# Patient Record
Sex: MALE | Race: WHITE | Employment: OTHER | URBAN - METROPOLITAN AREA
[De-identification: names, ages, dates, MRNs, and addresses within clinical notes are randomized per-mention and may not be internally consistent; named-entity substitution may affect disease eponyms.]

---

## 2017-05-12 ENCOUNTER — EMPLOYEE WELLNESS (OUTPATIENT)
Dept: OTHER | Age: 72
End: 2017-05-12

## 2017-05-12 LAB
CHOLESTEROL, TOTAL: 202 MG/DL (ref 0–199)
GLUCOSE BLD-MCNC: 111 MG/DL (ref 74–107)
HDLC SERPL-MCNC: 41 MG/DL
LDL CHOLESTEROL CALCULATED: 133 MG/DL (ref 0–99)
TRIGL SERPL-MCNC: 142 MG/DL (ref 0–149)

## 2018-03-20 VITALS — BODY MASS INDEX: 30.41 KG/M2 | WEIGHT: 200 LBS

## 2018-04-05 ENCOUNTER — HOSPITAL ENCOUNTER (OUTPATIENT)
Dept: AUDIOLOGY | Age: 73
Discharge: HOME | End: 2018-04-05
Payer: COMMERCIAL

## 2018-04-05 PROCEDURE — 92567 TYMPANOMETRY: CPT | Performed by: AUDIOLOGIST

## 2018-04-05 PROCEDURE — 92557 COMPREHENSIVE HEARING TEST: CPT | Performed by: AUDIOLOGIST

## 2018-04-28 ENCOUNTER — EMPLOYEE WELLNESS (OUTPATIENT)
Dept: OTHER | Age: 73
End: 2018-04-28

## 2018-04-29 LAB
CHOLESTEROL, TOTAL: 164 MG/DL (ref 0–199)
GLUCOSE BLD-MCNC: 132 MG/DL (ref 74–107)
HBA1C MFR BLD: 6.4 % (ref 4.8–5.9)
HDLC SERPL-MCNC: 35 MG/DL
LDL CHOLESTEROL CALCULATED: 98 MG/DL (ref 0–99)
TRIGL SERPL-MCNC: 156 MG/DL (ref 0–149)

## 2018-05-07 VITALS — WEIGHT: 217 LBS | BODY MASS INDEX: 32.99 KG/M2

## 2018-10-12 ENCOUNTER — HOSPITAL ENCOUNTER (OUTPATIENT)
Dept: ULTRASOUND IMAGING | Age: 73
Discharge: HOME | End: 2018-10-12
Payer: COMMERCIAL

## 2018-10-12 DIAGNOSIS — R10.13 EPIGASTRIC PAIN: ICD-10-CM

## 2018-10-12 DIAGNOSIS — R10.13 EPIGASTRIC ABDOMINAL PAIN: ICD-10-CM

## 2018-10-12 PROCEDURE — 76775 US EXAM ABDO BACK WALL LIM: CPT

## 2018-10-12 PROCEDURE — 76705 ECHO EXAM OF ABDOMEN: CPT

## 2019-03-01 ENCOUNTER — HOSPITAL ENCOUNTER (OUTPATIENT)
Dept: AUDIOLOGY | Age: 74
Discharge: HOME | End: 2019-03-01

## 2019-03-01 PROCEDURE — 9990000010 HC NO CHARGE VISIT: Performed by: AUDIOLOGIST

## 2019-03-01 PROCEDURE — 92592 HC HEARING AID CHECK, ONE EAR: CPT | Performed by: AUDIOLOGIST

## 2019-05-11 ENCOUNTER — HOSPITAL ENCOUNTER (OUTPATIENT)
Dept: GENERAL RADIOLOGY | Age: 74
Discharge: HOME | End: 2019-05-13
Payer: COMMERCIAL

## 2019-05-11 ENCOUNTER — HOSPITAL ENCOUNTER (OUTPATIENT)
Age: 74
Discharge: HOME | End: 2019-05-13
Payer: COMMERCIAL

## 2019-05-11 DIAGNOSIS — M25.512 LEFT SHOULDER PAIN, UNSPECIFIED CHRONICITY: ICD-10-CM

## 2019-05-11 DIAGNOSIS — R52 PAIN: ICD-10-CM

## 2019-05-11 DIAGNOSIS — M89.8X2 PAIN OF LEFT HUMERUS: ICD-10-CM

## 2019-05-11 PROCEDURE — 71046 X-RAY EXAM CHEST 2 VIEWS: CPT

## 2019-05-11 PROCEDURE — 73060 X-RAY EXAM OF HUMERUS: CPT

## 2019-05-11 PROCEDURE — 73030 X-RAY EXAM OF SHOULDER: CPT

## 2019-05-31 ENCOUNTER — EMPLOYEE WELLNESS (OUTPATIENT)
Dept: OTHER | Age: 74
End: 2019-05-31

## 2019-05-31 LAB
CHOLESTEROL, TOTAL: 178 MG/DL (ref 0–199)
GLUCOSE BLD-MCNC: 127 MG/DL (ref 74–107)
HDLC SERPL-MCNC: 39 MG/DL
LDL CHOLESTEROL CALCULATED: 108 MG/DL (ref 0–99)
TRIGL SERPL-MCNC: 155 MG/DL (ref 0–149)

## 2019-06-01 LAB — HBA1C MFR BLD: 6.3 % (ref 4–5.6)

## 2019-06-10 VITALS — BODY MASS INDEX: 32.54 KG/M2 | WEIGHT: 214 LBS

## 2019-09-23 DIAGNOSIS — M25.562 ACUTE PAIN OF LEFT KNEE: Primary | ICD-10-CM

## 2019-09-24 ENCOUNTER — OFFICE VISIT (OUTPATIENT)
Dept: ORTHOPEDIC SURGERY | Age: 74
End: 2019-09-24
Payer: COMMERCIAL

## 2019-09-24 VITALS — WEIGHT: 220 LBS | TEMPERATURE: 98 F | HEIGHT: 67 IN | BODY MASS INDEX: 34.53 KG/M2

## 2019-09-24 DIAGNOSIS — M17.12 PRIMARY OSTEOARTHRITIS OF LEFT KNEE: Primary | ICD-10-CM

## 2019-09-24 DIAGNOSIS — M17.11 PRIMARY OSTEOARTHRITIS OF RIGHT KNEE: ICD-10-CM

## 2019-09-24 PROCEDURE — 99203 OFFICE O/P NEW LOW 30 MIN: CPT | Performed by: ORTHOPAEDIC SURGERY

## 2019-09-24 RX ORDER — LOSARTAN POTASSIUM 100 MG/1
100 TABLET ORAL DAILY
COMMUNITY
End: 2021-02-08

## 2019-09-24 NOTE — PROGRESS NOTES
Chief Complaint   Patient presents with    Knee Pain     left knee pain has been present for 7 to 8 weeks. no hx of injury. Subjective:     Patient ID: Sally Calvillo is a 75 y.o..  male    Knee Pain  Patient complains of left knee pain. This is evaluated as a personal injury. There was not a history of injury. The pain began 2 months ago. The pain is located medial. He describes  His symptoms as aching. He has experienced popping, clicking, locking, and giving way in the affected knee. The patient has had pain with kneeling, squating, and climbing stairs. Symptoms improve with rest. The symptoms are worse with activity. The knee has not given out or felt unstable. The patient can not bend and straighten the knee fully. The patient is active in none. Treatment to date has been nothing specific, without significant relief. The patient is not working. The patients occupation is retired3.      Past Medical History:   Diagnosis Date    Diabetes mellitus ([facility] [address]     Hyperlipidemia     Hypertension      Past Surgical History:   Procedure Laterality Date    CARPAL TUNNEL RELEASE  2004 2005    EYE SURGERY      bilat cataract extracftions       Current Outpatient Medications:     losartan (COZAAR) 100 MG tablet, Take 100 mg by mouth daily, Disp: , Rfl:     pioglitazone-metformin (ACTOPLUS MET)  MG per tablet, Take 1 tablet by mouth daily, Disp: , Rfl:     metFORMIN (GLUCOPHAGE) 500 MG tablet, Take 1,000 mg by mouth 2 times daily (with meals), Disp: , Rfl:     Omega-3 Fatty Acids (FISH OIL) 1000 MG CAPS, Take 3,000 mg by mouth daily, Disp: , Rfl:     vitamin B-1 (THIAMINE) 100 MG tablet, Take 100 mg by mouth daily, Disp: , Rfl:     ascorbic acid (VITAMIN C) 500 MG tablet, Take 1,000 mg by mouth daily, Disp: , Rfl:     Multiple Vitamins-Minerals (THERAPEUTIC MULTIVITAMIN-MINERALS) tablet, Take 1 tablet by mouth daily, Disp: , Rfl:     Misc Natural Products (GLUCOSAMINE CHOND COMPLEX/MSM PO), Take 2 tablets by mouth daily, Disp: , Rfl:     pravastatin (PRAVACHOL) 10 MG tablet, Take 10 mg by mouth daily, Disp: , Rfl:     Homeopathic Products (LEG CRAMP COMPLEX PO), Take by mouth, Disp: , Rfl:     diphenhydrAMINE (BENADRYL) 25 MG tablet, Take 50 mg by mouth nightly as needed for Itching or Sleep, Disp: , Rfl:     valsartan-hydrochlorothiazide (DIOVAN HCT) 160-12.5 MG per tablet, Take 1 tablet by mouth daily, Disp: , Rfl:     tadalafil (CIALIS) 20 MG tablet, Take 20 mg by mouth once a week, Disp: , Rfl:   Allergies   Allergen Reactions    Prednisone Other (See Comments)     \"symptoms of having a stroke\"     Social History     Socioeconomic History    Marital status:      Spouse name: Not on file    Number of children: Not on file    Years of education: Not on file    Highest education level: Not on file   Occupational History    Not on file   Social Needs    Financial resource strain: Not on file    Food insecurity:     Worry: Not on file     Inability: Not on file    Transportation needs:     Medical: Not on file     Non-medical: Not on file   Tobacco Use    Smoking status: Never Smoker    Smokeless tobacco: Never Used   Substance and Sexual Activity    Alcohol use: No    Drug use: No    Sexual activity: Not on file   Lifestyle    Physical activity:     Days per week: Not on file     Minutes per session: Not on file    Stress: Not on file   Relationships    Social connections:     Talks on phone: Not on file     Gets together: Not on file     Attends Jewish service: Not on file     Active member of club or organization: Not on file     Attends meetings of clubs or organizations: Not on file     Relationship status: Not on file    Intimate partner violence:     Fear of current or ex partner: Not on file     Emotionally abused: Not on file     Physically abused: Not on file     Forced sexual activity: Not on file   Other Topics Concern    Not on file   Social History greatest in the medial compartment with bone-on-bone contact. There is   no joint effusion. There is normal alignment. The patella is well   located.  No fractures are identified.           Radiographic findings reviewed with patient    Assessment:  Encounter Diagnoses   Name Primary?  Primary osteoarthritis of left knee Yes    Primary osteoarthritis of right knee        Plan:  Natural history and expected course discussed. Questions answered. Educational materials distributed. Rest, ice, compression, and elevation (RICE) therapy. Reduction in offending activity. I had a lengthy discussion with the patient regarding their diagnosis. I explained treatment options including surgical vs non surgical treatment. I reviewed in detail the risks and benefits and outlined the procedure in detail with expected outcomes and possible complications. I also discussed non surgical treatment such as injections (CSI and visco supplementation), physical therapy, topical creams and NSAID's. They have elected for conservative management at this time. The patient has failed conservative measures such as NSAIDS, HEP, and cortisone injections. He is an excellent candidate for viscous supplementation injections including synvisc one in the Left knee.    We will contact the patient's insurance company and see them back in the office once we have received approval.

## 2019-10-31 ENCOUNTER — OFFICE VISIT (OUTPATIENT)
Dept: ORTHOPEDIC SURGERY | Age: 74
End: 2019-10-31
Payer: COMMERCIAL

## 2019-10-31 DIAGNOSIS — M17.12 PRIMARY OSTEOARTHRITIS OF LEFT KNEE: Primary | ICD-10-CM

## 2019-10-31 PROCEDURE — 20610 DRAIN/INJ JOINT/BURSA W/O US: CPT | Performed by: ORTHOPAEDIC SURGERY

## 2019-11-07 ENCOUNTER — OFFICE VISIT (OUTPATIENT)
Dept: ORTHOPEDIC SURGERY | Age: 74
End: 2019-11-07
Payer: COMMERCIAL

## 2019-11-07 DIAGNOSIS — M17.12 PRIMARY OSTEOARTHRITIS OF LEFT KNEE: Primary | ICD-10-CM

## 2019-11-07 PROCEDURE — 20610 DRAIN/INJ JOINT/BURSA W/O US: CPT | Performed by: ORTHOPAEDIC SURGERY

## 2019-11-14 ENCOUNTER — OFFICE VISIT (OUTPATIENT)
Dept: ORTHOPEDIC SURGERY | Age: 74
End: 2019-11-14
Payer: COMMERCIAL

## 2019-11-14 DIAGNOSIS — M17.12 PRIMARY OSTEOARTHRITIS OF LEFT KNEE: Primary | ICD-10-CM

## 2019-11-14 PROCEDURE — 20610 DRAIN/INJ JOINT/BURSA W/O US: CPT | Performed by: ORTHOPAEDIC SURGERY

## 2020-06-08 ENCOUNTER — HOSPITAL ENCOUNTER (OUTPATIENT)
Age: 75
Discharge: HOME | End: 2020-06-10
Payer: COMMERCIAL

## 2020-06-08 LAB
ALBUMIN SERPL-MCNC: 4.4 G/DL (ref 3.5–5.2)
ALP BLD-CCNC: 63 U/L (ref 40–129)
ALT SERPL-CCNC: 15 U/L (ref 0–40)
ANION GAP SERPL CALCULATED.3IONS-SCNC: 13 MMOL/L (ref 7–16)
AST SERPL-CCNC: 22 U/L (ref 0–39)
BASOPHILS ABSOLUTE: 0.04 E9/L (ref 0–0.2)
BASOPHILS RELATIVE PERCENT: 0.6 % (ref 0–2)
BILIRUB SERPL-MCNC: 0.7 MG/DL (ref 0–1.2)
BUN BLDV-MCNC: 23 MG/DL (ref 8–23)
CALCIUM SERPL-MCNC: 9.9 MG/DL (ref 8.6–10.2)
CHLORIDE BLD-SCNC: 103 MMOL/L (ref 98–107)
CHOLESTEROL, TOTAL: 172 MG/DL (ref 0–199)
CO2: 25 MMOL/L (ref 22–29)
CREAT SERPL-MCNC: 1.4 MG/DL (ref 0.7–1.2)
EOSINOPHILS ABSOLUTE: 0.16 E9/L (ref 0.05–0.5)
EOSINOPHILS RELATIVE PERCENT: 2.3 % (ref 0–6)
GFR AFRICAN AMERICAN: 60
GFR NON-AFRICAN AMERICAN: 49 ML/MIN/1.73
GLUCOSE BLD-MCNC: 126 MG/DL (ref 74–99)
HBA1C MFR BLD: 6.3 % (ref 4–5.6)
HCT VFR BLD CALC: 41.5 % (ref 37–54)
HDLC SERPL-MCNC: 41 MG/DL
HEMOGLOBIN: 13.2 G/DL (ref 12.5–16.5)
IMMATURE GRANULOCYTES #: 0.05 E9/L
IMMATURE GRANULOCYTES %: 0.7 % (ref 0–5)
LDL CHOLESTEROL CALCULATED: 105 MG/DL (ref 0–99)
LYMPHOCYTES ABSOLUTE: 1.72 E9/L (ref 1.5–4)
LYMPHOCYTES RELATIVE PERCENT: 24.7 % (ref 20–42)
MCH RBC QN AUTO: 31.1 PG (ref 26–35)
MCHC RBC AUTO-ENTMCNC: 31.8 % (ref 32–34.5)
MCV RBC AUTO: 97.6 FL (ref 80–99.9)
MICROALBUMIN UR-MCNC: 20.2 MG/L
MONOCYTES ABSOLUTE: 0.72 E9/L (ref 0.1–0.95)
MONOCYTES RELATIVE PERCENT: 10.3 % (ref 2–12)
NEUTROPHILS ABSOLUTE: 4.27 E9/L (ref 1.8–7.3)
NEUTROPHILS RELATIVE PERCENT: 61.4 % (ref 43–80)
PDW BLD-RTO: 13.4 FL (ref 11.5–15)
PLATELET # BLD: 175 E9/L (ref 130–450)
PMV BLD AUTO: 13.3 FL (ref 7–12)
POTASSIUM SERPL-SCNC: 4.7 MMOL/L (ref 3.5–5)
PROSTATE SPECIFIC ANTIGEN: 3.84 NG/ML (ref 0–4)
RBC # BLD: 4.25 E12/L (ref 3.8–5.8)
SODIUM BLD-SCNC: 141 MMOL/L (ref 132–146)
T4 FREE: 1.15 NG/DL (ref 0.93–1.7)
TOTAL PROTEIN: 6.9 G/DL (ref 6.4–8.3)
TRIGL SERPL-MCNC: 131 MG/DL (ref 0–149)
TSH SERPL DL<=0.05 MIU/L-ACNC: 3.75 UIU/ML (ref 0.27–4.2)
VLDLC SERPL CALC-MCNC: 26 MG/DL
WBC # BLD: 7 E9/L (ref 4.5–11.5)

## 2020-06-08 PROCEDURE — 82044 UR ALBUMIN SEMIQUANTITATIVE: CPT

## 2020-06-08 PROCEDURE — 83036 HEMOGLOBIN GLYCOSYLATED A1C: CPT

## 2020-06-08 PROCEDURE — 80053 COMPREHEN METABOLIC PANEL: CPT

## 2020-06-08 PROCEDURE — 36415 COLL VENOUS BLD VENIPUNCTURE: CPT

## 2020-06-08 PROCEDURE — G0103 PSA SCREENING: HCPCS

## 2020-06-08 PROCEDURE — 80061 LIPID PANEL: CPT

## 2020-06-08 PROCEDURE — 84439 ASSAY OF FREE THYROXINE: CPT

## 2020-06-08 PROCEDURE — 84443 ASSAY THYROID STIM HORMONE: CPT

## 2020-06-08 PROCEDURE — 85025 COMPLETE CBC W/AUTO DIFF WBC: CPT

## 2020-07-22 ENCOUNTER — HOSPITAL ENCOUNTER (OUTPATIENT)
Age: 75
Discharge: HOME | End: 2020-07-24
Payer: COMMERCIAL

## 2020-07-22 LAB
ANION GAP SERPL CALCULATED.3IONS-SCNC: 11 MMOL/L (ref 7–16)
BUN BLDV-MCNC: 20 MG/DL (ref 8–23)
CALCIUM SERPL-MCNC: 9.6 MG/DL (ref 8.6–10.2)
CHLORIDE BLD-SCNC: 100 MMOL/L (ref 98–107)
CO2: 29 MMOL/L (ref 22–29)
CREAT SERPL-MCNC: 1.3 MG/DL (ref 0.7–1.2)
GFR AFRICAN AMERICAN: >60
GFR NON-AFRICAN AMERICAN: 54 ML/MIN/1.73
GLUCOSE BLD-MCNC: 170 MG/DL (ref 74–99)
PARATHYROID HORMONE INTACT: 20 PG/ML (ref 15–65)
PHOSPHORUS: 2.9 MG/DL (ref 2.5–4.5)
POTASSIUM SERPL-SCNC: 4.4 MMOL/L (ref 3.5–5)
SODIUM BLD-SCNC: 140 MMOL/L (ref 132–146)

## 2020-07-22 PROCEDURE — 84100 ASSAY OF PHOSPHORUS: CPT

## 2020-07-22 PROCEDURE — 83970 ASSAY OF PARATHORMONE: CPT

## 2020-07-22 PROCEDURE — 36415 COLL VENOUS BLD VENIPUNCTURE: CPT

## 2020-07-22 PROCEDURE — 80048 BASIC METABOLIC PNL TOTAL CA: CPT

## 2020-09-24 VITALS
RESPIRATION RATE: 12 BRPM | BODY MASS INDEX: 34.14 KG/M2 | HEART RATE: 74 BPM | WEIGHT: 218 LBS | DIASTOLIC BLOOD PRESSURE: 60 MMHG | SYSTOLIC BLOOD PRESSURE: 132 MMHG

## 2020-12-29 NOTE — TELEPHONE ENCOUNTER
Patient called for refill.     Patient is requesting a refill of:  Medication: Metformin   Dose: 500 mg   Frequency: 2 times daily  Pharmacy: [facility]    Patient is requesting a refill of:  Medication: Actoplus Met  Dose:  mg  Frequency: 1 daily  Pharmacy: [address] [place] [address] seen Visit date not found  Next appt 2/8/2021

## 2020-12-30 RX ORDER — PIOGLITAZONE HCL AND METFORMIN HCL 850; 15 MG/1; MG/1
1 TABLET ORAL DAILY
Qty: 90 TABLET | Refills: 1 | Status: SHIPPED
Start: 2020-12-30 | End: 2021-02-08

## 2020-12-30 RX ORDER — METFORMIN HYDROCHLORIDE 500 MG/1
500 TABLET, EXTENDED RELEASE ORAL 2 TIMES DAILY
Qty: 180 TABLET | Refills: 1 | Status: SHIPPED
Start: 2020-12-30 | End: 2021-02-08 | Stop reason: SDUPTHER

## 2021-02-03 ENCOUNTER — IMMUNIZATION (OUTPATIENT)
Dept: PRIMARY CARE CLINIC | Age: 76
End: 2021-02-03
Payer: COMMERCIAL

## 2021-02-03 PROCEDURE — 0011A COVID-19, MODERNA VACCINE 100MCG/0.5ML DOSE: CPT | Performed by: NURSE PRACTITIONER

## 2021-02-03 PROCEDURE — 91301 COVID-19, MODERNA VACCINE 100MCG/0.5ML DOSE: CPT | Performed by: NURSE PRACTITIONER

## 2021-02-08 ENCOUNTER — OFFICE VISIT (OUTPATIENT)
Dept: FAMILY MEDICINE CLINIC | Age: 76
End: 2021-02-08
Payer: COMMERCIAL

## 2021-02-08 VITALS
WEIGHT: 218 LBS | TEMPERATURE: 97.5 F | SYSTOLIC BLOOD PRESSURE: 138 MMHG | BODY MASS INDEX: 34.21 KG/M2 | OXYGEN SATURATION: 96 % | DIASTOLIC BLOOD PRESSURE: 84 MMHG | HEIGHT: 67 IN | HEART RATE: 100 BPM

## 2021-02-08 DIAGNOSIS — E03.9 HYPOTHYROIDISM, UNSPECIFIED TYPE: ICD-10-CM

## 2021-02-08 DIAGNOSIS — I10 ESSENTIAL HYPERTENSION: Primary | ICD-10-CM

## 2021-02-08 DIAGNOSIS — E11.9 TYPE 2 DIABETES MELLITUS WITHOUT COMPLICATION, WITHOUT LONG-TERM CURRENT USE OF INSULIN (HCC): ICD-10-CM

## 2021-02-08 DIAGNOSIS — E78.5 HYPERLIPIDEMIA, UNSPECIFIED HYPERLIPIDEMIA TYPE: ICD-10-CM

## 2021-02-08 DIAGNOSIS — Z12.5 PROSTATE CANCER SCREENING: ICD-10-CM

## 2021-02-08 PROCEDURE — 99213 OFFICE O/P EST LOW 20 MIN: CPT | Performed by: INTERNAL MEDICINE

## 2021-02-08 RX ORDER — METFORMIN HYDROCHLORIDE 500 MG/1
500 TABLET, EXTENDED RELEASE ORAL
Qty: 270 TABLET | Refills: 1 | Status: SHIPPED
Start: 2021-02-08 | End: 2021-06-24 | Stop reason: SDUPTHER

## 2021-02-08 RX ORDER — TADALAFIL 20 MG/1
20 TABLET ORAL WEEKLY
Qty: 40 TABLET | Refills: 1 | Status: SHIPPED | OUTPATIENT
Start: 2021-02-08 | End: 2022-05-25 | Stop reason: SDUPTHER

## 2021-02-08 RX ORDER — ACETAMINOPHEN 160 MG
TABLET,DISINTEGRATING ORAL
COMMUNITY

## 2021-02-08 RX ORDER — PIOGLITAZONE HCL AND METFORMIN HCL 850; 15 MG/1; MG/1
1 TABLET ORAL DAILY
Qty: 90 TABLET | Refills: 1 | Status: CANCELLED | OUTPATIENT
Start: 2021-02-08

## 2021-02-08 RX ORDER — PRAVASTATIN SODIUM 10 MG
10 TABLET ORAL DAILY
Qty: 90 TABLET | Refills: 1 | Status: SHIPPED
Start: 2021-02-08 | End: 2021-06-24 | Stop reason: SDUPTHER

## 2021-02-08 RX ORDER — VALSARTAN AND HYDROCHLOROTHIAZIDE 160; 12.5 MG/1; MG/1
1 TABLET, FILM COATED ORAL DAILY
Qty: 90 TABLET | Refills: 1 | Status: SHIPPED
Start: 2021-02-08 | End: 2021-06-24 | Stop reason: SDUPTHER

## 2021-02-08 SDOH — ECONOMIC STABILITY: TRANSPORTATION INSECURITY
IN THE PAST 12 MONTHS, HAS LACK OF TRANSPORTATION KEPT YOU FROM MEETINGS, WORK, OR FROM GETTING THINGS NEEDED FOR DAILY LIVING?: NO

## 2021-02-08 SDOH — ECONOMIC STABILITY: TRANSPORTATION INSECURITY
IN THE PAST 12 MONTHS, HAS THE LACK OF TRANSPORTATION KEPT YOU FROM MEDICAL APPOINTMENTS OR FROM GETTING MEDICATIONS?: NOT ASKED

## 2021-02-08 SDOH — ECONOMIC STABILITY: FOOD INSECURITY: WITHIN THE PAST 12 MONTHS, YOU WORRIED THAT YOUR FOOD WOULD RUN OUT BEFORE YOU GOT MONEY TO BUY MORE.: NEVER TRUE

## 2021-02-08 ASSESSMENT — PATIENT HEALTH QUESTIONNAIRE - PHQ9
SUM OF ALL RESPONSES TO PHQ QUESTIONS 1-9: 0
1. LITTLE INTEREST OR PLEASURE IN DOING THINGS: 0
SUM OF ALL RESPONSES TO PHQ QUESTIONS 1-9: 0
SUM OF ALL RESPONSES TO PHQ QUESTIONS 1-9: 0
1. LITTLE INTEREST OR PLEASURE IN DOING THINGS: 0
SUM OF ALL RESPONSES TO PHQ QUESTIONS 1-9: 0
1. LITTLE INTEREST OR PLEASURE IN DOING THINGS: 0

## 2021-02-08 NOTE — PROGRESS NOTES
Subjective:     Chief Complaint   Patient presents with    6 Month Follow-Up   Follow-up on diabetes hypertension  Denies any chest pain palpitation  No blood in urine  No shortness of breath  Physically active  He had Zostavax, had pneumonia vaccine,      Past Medical History:   Diagnosis Date    Diabetes mellitus ([facility]     Erectile dysfunction     GERD (gastroesophageal reflux disease)     Hyperlipidemia     Hypertension     Type 2 diabetes mellitus without complication ([facility]         Social History     Socioeconomic History    Marital status:      Spouse name: Not on file    Number of children: Not on file    Years of education: Not on file    Highest education level: Not on file   Occupational History    Not on file   Social Needs    Financial resource strain: Not on file    Food insecurity     Worry: Never true     Inability: Never true   "[employer] needs     Medical: Not on file     Non-medical: No   Tobacco Use    Smoking status: Never Smoker    Smokeless tobacco: Never Used   Substance and Sexual Activity    Alcohol use: No    Drug use: No    Sexual activity: Not on file   Lifestyle    Physical activity     Days per week: Not on file     Minutes per session: Not on file    Stress: Not on file   Relationships    Social connections     Talks on phone: Not on file     Gets together: Not on file     Attends Congregation service: Not on file     Active member of club or organization: Not on file     Attends meetings of clubs or organizations: Not on file     Relationship status: Not on file    Intimate partner violence     Fear of current or ex partner: Not on file     Emotionally abused: Not on file     Physically abused: Not on file     Forced sexual activity: Not on file   Other Topics Concern    Not on file   Social History Narrative    Not on file        Past Surgical History:   Procedure Laterality Date    CARPAL TUNNEL RELEASE  2004 2005    EYE SURGERY bilat cataract extracftions    HEMORRHOID SURGERY      TOENAIL EXCISION          Family History   Problem Relation Age of Onset    Coronary Art Dis Mother     Cancer Mother         UTERINE    Diabetes Mother     Other Father 72        AAA        Allergies   Allergen Reactions    Prednisone Other (See Comments)     \"symptoms of having a stroke\"        ROS  No acute distress  Cardiac: Denies any chest pain or palpitation  Respiratory: Denies any cough or shortness of breath  GI: No abdominal pain. Denies any nausea vomiting or diarrhea  Had colonoscopy 2018 by Dr. Kady Jordan repeat in 23   denies any dysuria frequency no hematuria  Neuro: No headache or dizziness  Endocrine: No diabetes  Skin: normal  No recent weight gain or weight loss  Denies any change in vision    Objective:    /84   Pulse 100   Temp 97.5 °F (36.4 °C)   Ht 5' 7\" (1.702 m)   Wt 218 lb (98.9 kg)   SpO2 96%   BMI 34.14 kg/m²     Constitutional: Alert awake and oriented  Eyes: Pupils equal bilaterally. Extraocular muscles intact  Neck: no JVD adenopathy no bruit  Heart:  RRR, no murmurs, gallops, or rubs.   Lungs:    no wheeze, rales or rhonchi  Abd: bowel sounds present, nontender, nondistended, no masses  Extrem:  No clubbing, cyanosis, or edema  Neuro: AAOx3,No Focal deficit  Psychological: no depression or anxiety       Current Outpatient Medications   Medication Sig Dispense Refill    Cholecalciferol (VITAMIN D3) 50 MCG (2000 UT) CAPS Take by mouth      metFORMIN (GLUCOPHAGE-XR) 500 MG extended release tablet Take 1 tablet by mouth 3 times daily (with meals) 270 tablet 1    pravastatin (PRAVACHOL) 10 MG tablet Take 1 tablet by mouth daily 90 tablet 1    tadalafil (CIALIS) 20 MG tablet Take 1 tablet by mouth once a week 40 tablet 1    valsartan-hydrochlorothiazide (DIOVAN HCT) 160-12.5 MG per tablet Take 1 tablet by mouth daily 90 tablet 1  pioglitazone-metformin (ACTOPLUS MET)  MG per tablet Take 1 tablet by mouth daily 90 tablet 1    Omega-3 Fatty Acids (FISH OIL) 1000 MG CAPS Take 3,000 mg by mouth daily      vitamin B-1 (THIAMINE) 100 MG tablet Take 100 mg by mouth daily      ascorbic acid (VITAMIN C) 500 MG tablet Take 1,000 mg by mouth daily      Multiple Vitamins-Minerals (THERAPEUTIC MULTIVITAMIN-MINERALS) tablet Take 1 tablet by mouth daily      Misc Natural Products (GLUCOSAMINE CHOND COMPLEX/MSM PO) Take 2 tablets by mouth daily       No current facility-administered medications for this visit.          Last 3 BMP  Lab Results   Component Value Date/Time     07/22/2020 01:38 PM     06/08/2020 09:35 AM     02/17/2017 09:52 AM    K 4.4 07/22/2020 01:38 PM    K 4.7 06/08/2020 09:35 AM    K 4.3 02/17/2017 09:52 AM     07/22/2020 01:38 PM     06/08/2020 09:35 AM     02/17/2017 09:52 AM    CO2 29 07/22/2020 01:38 PM    CO2 25 06/08/2020 09:35 AM    CO2 27 02/17/2017 09:52 AM    BUN 20 07/22/2020 01:38 PM    BUN 23 06/08/2020 09:35 AM    BUN 17 02/17/2017 09:52 AM    CREATININE 1.3 (H) 07/22/2020 01:38 PM    CREATININE 1.4 (H) 06/08/2020 09:35 AM    CREATININE 1.2 02/17/2017 09:52 AM    GLUCOSE 170 (H) 07/22/2020 01:38 PM    GLUCOSE 126 (H) 06/08/2020 09:35 AM    GLUCOSE 127 (H) 05/31/2019 06:30 AM    GLUCOSE 144 (H) 03/28/2012 09:41 AM    GLUCOSE 157 (H) 11/10/2010 09:18 AM    CALCIUM 9.6 07/22/2020 01:38 PM    CALCIUM 9.9 06/08/2020 09:35 AM    CALCIUM 9.3 02/17/2017 09:52 AM       Last 3 CMP:    Lab Results   Component Value Date/Time     07/22/2020 01:38 PM     06/08/2020 09:35 AM     02/17/2017 09:52 AM    K 4.4 07/22/2020 01:38 PM    K 4.7 06/08/2020 09:35 AM    K 4.3 02/17/2017 09:52 AM     07/22/2020 01:38 PM     06/08/2020 09:35 AM     02/17/2017 09:52 AM    CO2 29 07/22/2020 01:38 PM    CO2 25 06/08/2020 09:35 AM    CO2 27 02/17/2017 09:52 AM BUN 20 07/22/2020 01:38 PM    BUN 23 06/08/2020 09:35 AM    BUN 17 02/17/2017 09:52 AM    CREATININE 1.3 (H) 07/22/2020 01:38 PM    CREATININE 1.4 (H) 06/08/2020 09:35 AM    CREATININE 1.2 02/17/2017 09:52 AM    GLUCOSE 170 (H) 07/22/2020 01:38 PM    GLUCOSE 126 (H) 06/08/2020 09:35 AM    GLUCOSE 127 (H) 05/31/2019 06:30 AM    GLUCOSE 144 (H) 03/28/2012 09:41 AM    GLUCOSE 157 (H) 11/10/2010 09:18 AM    CALCIUM 9.6 07/22/2020 01:38 PM    CALCIUM 9.9 06/08/2020 09:35 AM    CALCIUM 9.3 02/17/2017 09:52 AM    PROT 6.9 06/08/2020 09:35 AM    PROT 6.8 02/17/2017 09:52 AM    PROT 6.7 02/01/2016 10:13 AM    LABALBU 4.4 06/08/2020 09:35 AM    LABALBU 4.1 02/17/2017 09:52 AM    LABALBU 4.1 02/01/2016 10:13 AM    LABALBU 4.3 03/28/2012 09:41 AM    LABALBU 4.2 11/10/2010 09:18 AM    BILITOT 0.7 06/08/2020 09:35 AM    BILITOT 0.8 02/17/2017 09:52 AM    BILITOT 0.7 02/01/2016 10:13 AM    ALKPHOS 63 06/08/2020 09:35 AM    ALKPHOS 65 02/17/2017 09:52 AM    ALKPHOS 59 02/01/2016 10:13 AM    AST 22 06/08/2020 09:35 AM    AST 23 02/17/2017 09:52 AM    AST 21 02/01/2016 10:13 AM    ALT 15 06/08/2020 09:35 AM    ALT 15 02/17/2017 09:52 AM    ALT 15 02/01/2016 10:13 AM        CBC:   Lab Results   Component Value Date/Time    WBC 7.0 06/08/2020 09:35 AM    RBC 4.25 06/08/2020 09:35 AM    HGB 13.2 06/08/2020 09:35 AM    HCT 41.5 06/08/2020 09:35 AM    MCV 97.6 06/08/2020 09:35 AM    MCH 31.1 06/08/2020 09:35 AM    MCHC 31.8 (L) 06/08/2020 09:35 AM    RDW 13.4 06/08/2020 09:35 AM     06/08/2020 09:35 AM    MPV 13.3 (H) 06/08/2020 09:35 AM       A1C:  Lab Results   Component Value Date/Time    LABA1C 6.3 (H) 06/08/2020 09:35 AM       Lipid panel:  Lab Results   Component Value Date    CHOL 172 06/08/2020    CHOL 178 05/31/2019    CHOL 164 04/28/2018    TRIG 131 06/08/2020    TRIG 155 05/31/2019    TRIG 156 04/28/2018    HDL 41 06/08/2020    HDL 39 05/31/2019    HDL 35 04/28/2018        Lab Results   Component Value Date/Time PROT 6.9 06/08/2020 09:35 AM    PROT 6.8 02/17/2017 09:52 AM    PROT 6.7 02/01/2016 10:13 AM       No results found for: MG      Assessment. Srini Berman was seen today for 6 month follow-up. Diagnoses and all orders for this visit:    Essential hypertension  -     CBC Auto Differential; Future    Hyperlipidemia, unspecified hyperlipidemia type  -     CBC Auto Differential; Future  -     Comprehensive Metabolic Panel; Future  -     Lipid Panel; Future    Type 2 diabetes mellitus without complication, without long-term current use of insulin (HCC)  -     Hemoglobin A1C; Future  -     Urinalysis; Future  -     MICROALBUMIN / CREATININE URINE RATIO; Future    Prostate cancer screening  -     PSA screening; Future    Hypothyroidism, unspecified type  -     CBC Auto Differential; Future  -     T4; Future  -     TSH without Reflex; Future    Other orders  -     metFORMIN (GLUCOPHAGE-XR) 500 MG extended release tablet; Take 1 tablet by mouth 3 times daily (with meals)  -     pravastatin (PRAVACHOL) 10 MG tablet; Take 1 tablet by mouth daily  -     tadalafil (CIALIS) 20 MG tablet; Take 1 tablet by mouth once a week  -     valsartan-hydrochlorothiazide (DIOVAN HCT) 160-12.5 MG per tablet; Take 1 tablet by mouth daily       Patient Active Problem List   Diagnosis    Primary osteoarthritis of left knee    Primary osteoarthritis of right knee       Plan: Risk and benefit of Actos Metformin discussed again it has been discussed in the past  Today he decided to come off but and increase the Metformin to 3 tablets check A1c in the next visit in 3 months    Patient has erectile dysfunction Cialis does help prescription was given  Check a PSA    Continue current pravastatin check lipid profile      He is drinking more fluids now check kidney function      Return in about 3 months (around 5/8/2021).        Deon Chance MD  12:09 PM  2/8/2021     [place]

## 2021-03-03 ENCOUNTER — IMMUNIZATION (OUTPATIENT)
Dept: PRIMARY CARE CLINIC | Age: 76
End: 2021-03-03
Payer: COMMERCIAL

## 2021-03-03 PROCEDURE — 0012A COVID-19, MODERNA VACCINE 100MCG/0.5ML DOSE: CPT | Performed by: NURSE PRACTITIONER

## 2021-03-03 PROCEDURE — 91301 COVID-19, MODERNA VACCINE 100MCG/0.5ML DOSE: CPT | Performed by: NURSE PRACTITIONER

## 2021-04-24 ENCOUNTER — NURSE TRIAGE (OUTPATIENT)
Dept: OTHER | Facility: CLINIC | Age: 76
End: 2021-04-24

## 2021-04-24 NOTE — TELEPHONE ENCOUNTER
Reason for Disposition   [1] High-risk adult (e.g., age > 61, osteoporosis, chronic steroid use) AND [2] still hurts    Answer Assessment - Initial Assessment Questions  1. MECHANISM: \"How did the injury happen? \"      3 weeks ago cut his arm on piece of metal    2. ONSET: \"When did the injury happen? \" (Minutes or hours ago)   3 weeks ago    3. LOCATION: \"Where is the injury located? \"   Left arm    4. APPEARANCE of INJURY: \"What does the injury look like? \"   red , puffy    5. SEVERITY: \"Can you use the arm normally? \"      No    6. SWELLING or BRUISING: \"is there any swelling or bruising? \" If so, ask: \"How large is it? (e.g., inches, centimeters)    swelling, top of [place] soda can    7. PAIN: \"Is there pain? \" If so, ask: \"How bad is the pain? \"    (Scale 1-10; or mild, moderate, severe)    Mild    8. TETANUS: For any breaks in the skin, ask: \"When was the last tetanus booster? \"    No    9. OTHER SYMPTOMS: \"Do you have any other symptoms? \"  (e.g., numbness in hand)  No    Protocols used: ARM INJURY-ADULT-  Caller has health insurance questions. Number given, then call transferred to [facility] customer service, [phone]. Brief description of triage: arm injury    Triage indicates for patient to be seen at urgent care ore er in next 24 hours    Care advice provided, patient verbalizes understanding; denies any other questions or concerns; instructed to call back for any new or worsening symptoms. Attention Provider: Thank you for allowing me to participate in the care of your patient. The patient was connected to triage in response to symptoms provided. Please do not respond through this encounter as the response is not directed to a shared pool. Konstantin Heredia

## 2021-06-16 DIAGNOSIS — Z12.5 PROSTATE CANCER SCREENING: ICD-10-CM

## 2021-06-16 DIAGNOSIS — E03.9 HYPOTHYROIDISM, UNSPECIFIED TYPE: ICD-10-CM

## 2021-06-16 DIAGNOSIS — I10 ESSENTIAL HYPERTENSION: ICD-10-CM

## 2021-06-16 DIAGNOSIS — E78.5 HYPERLIPIDEMIA, UNSPECIFIED HYPERLIPIDEMIA TYPE: ICD-10-CM

## 2021-06-16 DIAGNOSIS — E11.9 TYPE 2 DIABETES MELLITUS WITHOUT COMPLICATION, WITHOUT LONG-TERM CURRENT USE OF INSULIN (HCC): ICD-10-CM

## 2021-06-16 LAB
ALBUMIN SERPL-MCNC: 4.6 G/DL (ref 3.5–5.2)
ALP BLD-CCNC: 82 U/L (ref 40–129)
ALT SERPL-CCNC: 20 U/L (ref 0–40)
ANION GAP SERPL CALCULATED.3IONS-SCNC: 12 MMOL/L (ref 7–16)
AST SERPL-CCNC: 25 U/L (ref 0–39)
BASOPHILS ABSOLUTE: 0.04 E9/L (ref 0–0.2)
BASOPHILS RELATIVE PERCENT: 0.6 % (ref 0–2)
BILIRUB SERPL-MCNC: 0.6 MG/DL (ref 0–1.2)
BUN BLDV-MCNC: 20 MG/DL (ref 6–23)
CALCIUM SERPL-MCNC: 9.8 MG/DL (ref 8.6–10.2)
CHLORIDE BLD-SCNC: 101 MMOL/L (ref 98–107)
CHOLESTEROL, TOTAL: 172 MG/DL (ref 0–199)
CO2: 24 MMOL/L (ref 22–29)
CREAT SERPL-MCNC: 1.2 MG/DL (ref 0.7–1.2)
CREATININE URINE: 173 MG/DL (ref 40–278)
EOSINOPHILS ABSOLUTE: 0.15 E9/L (ref 0.05–0.5)
EOSINOPHILS RELATIVE PERCENT: 2.4 % (ref 0–6)
GFR AFRICAN AMERICAN: >60
GFR NON-AFRICAN AMERICAN: 59 ML/MIN/1.73
GLUCOSE BLD-MCNC: 140 MG/DL (ref 74–99)
HBA1C MFR BLD: 6.6 % (ref 4–5.6)
HCT VFR BLD CALC: 44.1 % (ref 37–54)
HDLC SERPL-MCNC: 43 MG/DL
HEMOGLOBIN: 14.5 G/DL (ref 12.5–16.5)
IMMATURE GRANULOCYTES #: 0.04 E9/L
IMMATURE GRANULOCYTES %: 0.6 % (ref 0–5)
LDL CHOLESTEROL CALCULATED: 108 MG/DL (ref 0–99)
LYMPHOCYTES ABSOLUTE: 1.45 E9/L (ref 1.5–4)
LYMPHOCYTES RELATIVE PERCENT: 23 % (ref 20–42)
MCH RBC QN AUTO: 31 PG (ref 26–35)
MCHC RBC AUTO-ENTMCNC: 32.9 % (ref 32–34.5)
MCV RBC AUTO: 94.2 FL (ref 80–99.9)
MICROALBUMIN UR-MCNC: 22 MG/L
MICROALBUMIN/CREAT UR-RTO: 12.7 (ref 0–30)
MONOCYTES ABSOLUTE: 0.64 E9/L (ref 0.1–0.95)
MONOCYTES RELATIVE PERCENT: 10.1 % (ref 2–12)
NEUTROPHILS ABSOLUTE: 3.99 E9/L (ref 1.8–7.3)
NEUTROPHILS RELATIVE PERCENT: 63.3 % (ref 43–80)
PDW BLD-RTO: 12.7 FL (ref 11.5–15)
PLATELET # BLD: 175 E9/L (ref 130–450)
PMV BLD AUTO: 13.5 FL (ref 7–12)
POTASSIUM SERPL-SCNC: 4.7 MMOL/L (ref 3.5–5)
PROSTATE SPECIFIC ANTIGEN: 2.79 NG/ML (ref 0–4)
RBC # BLD: 4.68 E12/L (ref 3.8–5.8)
SODIUM BLD-SCNC: 137 MMOL/L (ref 132–146)
T4 TOTAL: 6.9 MCG/DL (ref 4.5–11.7)
TOTAL PROTEIN: 7.2 G/DL (ref 6.4–8.3)
TRIGL SERPL-MCNC: 103 MG/DL (ref 0–149)
TSH SERPL DL<=0.05 MIU/L-ACNC: 2.63 UIU/ML (ref 0.27–4.2)
VLDLC SERPL CALC-MCNC: 21 MG/DL
WBC # BLD: 6.3 E9/L (ref 4.5–11.5)

## 2021-06-24 ENCOUNTER — OFFICE VISIT (OUTPATIENT)
Dept: FAMILY MEDICINE CLINIC | Age: 76
End: 2021-06-24
Payer: COMMERCIAL

## 2021-06-24 VITALS
TEMPERATURE: 95.7 F | WEIGHT: 211 LBS | HEART RATE: 92 BPM | BODY MASS INDEX: 33.05 KG/M2 | DIASTOLIC BLOOD PRESSURE: 74 MMHG | SYSTOLIC BLOOD PRESSURE: 130 MMHG | OXYGEN SATURATION: 98 %

## 2021-06-24 DIAGNOSIS — M17.0 PRIMARY OSTEOARTHRITIS OF BOTH KNEES: ICD-10-CM

## 2021-06-24 DIAGNOSIS — R80.9 PROTEINURIA, UNSPECIFIED TYPE: ICD-10-CM

## 2021-06-24 DIAGNOSIS — I10 ESSENTIAL HYPERTENSION: ICD-10-CM

## 2021-06-24 DIAGNOSIS — E11.22 CKD STAGE 3 SECONDARY TO DIABETES (HCC): ICD-10-CM

## 2021-06-24 DIAGNOSIS — E11.9 TYPE 2 DIABETES MELLITUS WITHOUT COMPLICATION, WITHOUT LONG-TERM CURRENT USE OF INSULIN (HCC): ICD-10-CM

## 2021-06-24 DIAGNOSIS — N52.9 ERECTILE DYSFUNCTION, UNSPECIFIED ERECTILE DYSFUNCTION TYPE: ICD-10-CM

## 2021-06-24 DIAGNOSIS — N18.30 CKD STAGE 3 SECONDARY TO DIABETES (HCC): ICD-10-CM

## 2021-06-24 DIAGNOSIS — E78.5 HYPERLIPIDEMIA, UNSPECIFIED HYPERLIPIDEMIA TYPE: Primary | ICD-10-CM

## 2021-06-24 PROCEDURE — 99213 OFFICE O/P EST LOW 20 MIN: CPT | Performed by: INTERNAL MEDICINE

## 2021-06-24 RX ORDER — METFORMIN HYDROCHLORIDE 500 MG/1
500 TABLET, EXTENDED RELEASE ORAL
Qty: 270 TABLET | Refills: 1 | Status: SHIPPED
Start: 2021-06-24 | End: 2021-11-01 | Stop reason: SDUPTHER

## 2021-06-24 RX ORDER — PRAVASTATIN SODIUM 10 MG
10 TABLET ORAL DAILY
Qty: 90 TABLET | Refills: 1 | Status: CANCELLED | OUTPATIENT
Start: 2021-06-24

## 2021-06-24 RX ORDER — PRAVASTATIN SODIUM 20 MG
20 TABLET ORAL DAILY
Qty: 90 TABLET | Refills: 1 | Status: SHIPPED
Start: 2021-06-24 | End: 2021-11-01

## 2021-06-24 RX ORDER — VALSARTAN AND HYDROCHLOROTHIAZIDE 160; 12.5 MG/1; MG/1
1 TABLET, FILM COATED ORAL DAILY
Qty: 90 TABLET | Refills: 1 | Status: SHIPPED
Start: 2021-06-24 | End: 2021-11-01 | Stop reason: SDUPTHER

## 2021-06-24 NOTE — PROGRESS NOTES
Subjective:     Chief Complaint   Patient presents with    3 Month Follow-Up   Patient here for follow-up on hypertension the diabetes  Check on the lab report    Follow-up on the cholesterol    And knee pains cannot walk long distance  Symptoms hip pain    Denies any chest pain or palpitation  Does not exercise  He is active in the house in the yard going up and down the steps    Past Medical History:   Diagnosis Date    Diabetes mellitus ([facility]     Erectile dysfunction     GERD (gastroesophageal reflux disease)     Hyperlipidemia     Hypertension     Type 2 diabetes mellitus without complication ([facility]         Social History     Socioeconomic History    Marital status:      Spouse name: Not on file    Number of children: Not on file    Years of education: Not on file    Highest education level: Not on file   Occupational History    Not on file   Tobacco Use    Smoking status: Never Smoker    Smokeless tobacco: Never Used   Substance and Sexual Activity    Alcohol use: No    Drug use: No    Sexual activity: Not on file   Other Topics Concern    Not on file   Social History Narrative    Not on file     Social Determinants of Health     Financial Resource Strain:     Difficulty of Paying Living Expenses:    Food Insecurity: No Food Insecurity    Worried About Running Out of Food in the Last Year: Never true    Tanika of Food in the Last Year: Never true   Transportation Needs: Unknown    Lack of Transportation (Medical): Not asked    Lack of Transportation (Non-Medical):  No   Physical Activity:     Days of Exercise per Week:     Minutes of Exercise per Session:    Stress:     Feeling of Stress :    Social Connections:     Frequency of Communication with Friends and Family:     Frequency of Social Gatherings with Friends and Family:     Attends [employer] Services:     Active Member of Clubs or Organizations:     Attends Club or Organization Meetings:     Marital Status: Intimate Partner Violence:     Fear of Current or Ex-Partner:     Emotionally Abused:     Physically Abused:     Sexually Abused:         Past Surgical History:   Procedure Laterality Date    CARPAL TUNNEL RELEASE  2004 2005    EYE SURGERY      bilat cataract extracftions    HEMORRHOID SURGERY      TOENAIL EXCISION          Family History   Problem Relation Age of Onset    Coronary Art Dis Mother     Cancer Mother         UTERINE    Diabetes Mother     Other Father 72        AAA        Allergies   Allergen Reactions    Prednisone Other (See Comments)     \"symptoms of having a stroke\"        ROS  No acute distress  Cardiac: Denies any chest pain or palpitation  Denies any exertional angina  Denies any chest pain  Declined to see any cardiologist which was requested because of risk factors of hypertension diabetes and hyperlipidemia  Patient will exercise and see how he feels  Respiratory: Denies any cough or shortness of breath  GI: No abdominal pain. Denies any nausea vomiting or diarrhea  Colonoscopy 2018 Dr. Kyra Sanchez repeat in 2023  : Denies any dysuria frequency or hematuria  Neuro: No headache or dizziness  Endocrine:   Hx of diabetes  Skin: normal  No recent weight gain or weight loss  Denies any change in vision    Objective:    /74   Pulse 92   Temp 95.7 °F (35.4 °C)   Wt 211 lb (95.7 kg)   SpO2 98%   BMI 33.05 kg/m²     Constitutional: Alert awake and oriented  Eyes: Pupils equal bilaterally. Extraocular muscles intact  Neck: no JVD adenopathy no bruit  Heart:  RRR, no murmurs, gallops, or rubs.   Lungs:    no wheeze, rales or rhonchi  Abd: bowel sounds present, nontender, nondistended, no masses  Extrem:  No clubbing, cyanosis, or edema  Neuro: AAOx3,No Focal deficit  Psychological: no depression or anxiety       Current Outpatient Medications   Medication Sig Dispense Refill    valsartan-hydroCHLOROthiazide (DIOVAN HCT) 160-12.5 MG per tablet Take 1 tablet by mouth daily 90 tablet 1  metFORMIN (GLUCOPHAGE-XR) 500 MG extended release tablet Take 1 tablet by mouth 3 times daily (with meals) 270 tablet 1    pravastatin (PRAVACHOL) 20 MG tablet Take 1 tablet by mouth daily 90 tablet 1    Cholecalciferol (VITAMIN D3) 50 MCG (2000 UT) CAPS Take by mouth      tadalafil (CIALIS) 20 MG tablet Take 1 tablet by mouth once a week 40 tablet 1    Omega-3 Fatty Acids (FISH OIL) 1000 MG CAPS Take 3,000 mg by mouth daily      vitamin B-1 (THIAMINE) 100 MG tablet Take 100 mg by mouth daily      ascorbic acid (VITAMIN C) 500 MG tablet Take 1,000 mg by mouth daily      Multiple Vitamins-Minerals (THERAPEUTIC MULTIVITAMIN-MINERALS) tablet Take 1 tablet by mouth daily      Misc Natural Products (GLUCOSAMINE CHOND COMPLEX/MSM PO) Take 2 tablets by mouth daily       No current facility-administered medications for this visit.         Last 3 BMP  Lab Results   Component Value Date/Time     06/16/2021 10:20 AM     07/22/2020 01:38 PM     06/08/2020 09:35 AM    K 4.7 06/16/2021 10:20 AM    K 4.4 07/22/2020 01:38 PM    K 4.7 06/08/2020 09:35 AM     06/16/2021 10:20 AM     07/22/2020 01:38 PM     06/08/2020 09:35 AM    CO2 24 06/16/2021 10:20 AM    CO2 29 07/22/2020 01:38 PM    CO2 25 06/08/2020 09:35 AM    BUN 20 06/16/2021 10:20 AM    BUN 20 07/22/2020 01:38 PM    BUN 23 06/08/2020 09:35 AM    CREATININE 1.2 06/16/2021 10:20 AM    CREATININE 1.3 (H) 07/22/2020 01:38 PM    CREATININE 1.4 (H) 06/08/2020 09:35 AM    GLUCOSE 140 (H) 06/16/2021 10:20 AM    GLUCOSE 170 (H) 07/22/2020 01:38 PM    GLUCOSE 126 (H) 06/08/2020 09:35 AM    GLUCOSE 144 (H) 03/28/2012 09:41 AM    GLUCOSE 157 (H) 11/10/2010 09:18 AM    CALCIUM 9.8 06/16/2021 10:20 AM    CALCIUM 9.6 07/22/2020 01:38 PM    CALCIUM 9.9 06/08/2020 09:35 AM       Last 3 CMP:    Lab Results   Component Value Date/Time     06/16/2021 10:20 AM     07/22/2020 01:38 PM     06/08/2020 09:35 AM    K 4.7 06/16/2021 10:20 AM    K 4.4 07/22/2020 01:38 PM    K 4.7 06/08/2020 09:35 AM     06/16/2021 10:20 AM     07/22/2020 01:38 PM     06/08/2020 09:35 AM    CO2 24 06/16/2021 10:20 AM    CO2 29 07/22/2020 01:38 PM    CO2 25 06/08/2020 09:35 AM    BUN 20 06/16/2021 10:20 AM    BUN 20 07/22/2020 01:38 PM    BUN 23 06/08/2020 09:35 AM    CREATININE 1.2 06/16/2021 10:20 AM    CREATININE 1.3 (H) 07/22/2020 01:38 PM    CREATININE 1.4 (H) 06/08/2020 09:35 AM    GLUCOSE 140 (H) 06/16/2021 10:20 AM    GLUCOSE 170 (H) 07/22/2020 01:38 PM    GLUCOSE 126 (H) 06/08/2020 09:35 AM    GLUCOSE 144 (H) 03/28/2012 09:41 AM    GLUCOSE 157 (H) 11/10/2010 09:18 AM    CALCIUM 9.8 06/16/2021 10:20 AM    CALCIUM 9.6 07/22/2020 01:38 PM    CALCIUM 9.9 06/08/2020 09:35 AM    PROT 7.2 06/16/2021 10:20 AM    PROT 6.9 06/08/2020 09:35 AM    PROT 6.8 02/17/2017 09:52 AM    LABALBU 4.6 06/16/2021 10:20 AM    LABALBU 4.4 06/08/2020 09:35 AM    LABALBU 4.1 02/17/2017 09:52 AM    LABALBU 4.3 03/28/2012 09:41 AM    LABALBU 4.2 11/10/2010 09:18 AM    BILITOT 0.6 06/16/2021 10:20 AM    BILITOT 0.7 06/08/2020 09:35 AM    BILITOT 0.8 02/17/2017 09:52 AM    ALKPHOS 82 06/16/2021 10:20 AM    ALKPHOS 63 06/08/2020 09:35 AM    ALKPHOS 65 02/17/2017 09:52 AM    AST 25 06/16/2021 10:20 AM    AST 22 06/08/2020 09:35 AM    AST 23 02/17/2017 09:52 AM    ALT 20 06/16/2021 10:20 AM    ALT 15 06/08/2020 09:35 AM    ALT 15 02/17/2017 09:52 AM        CBC:   Lab Results   Component Value Date/Time    WBC 6.3 06/16/2021 10:20 AM    RBC 4.68 06/16/2021 10:20 AM    HGB 14.5 06/16/2021 10:20 AM    HCT 44.1 06/16/2021 10:20 AM    MCV 94.2 06/16/2021 10:20 AM    MCH 31.0 06/16/2021 10:20 AM    MCHC 32.9 06/16/2021 10:20 AM    RDW 12.7 06/16/2021 10:20 AM     06/16/2021 10:20 AM    MPV 13.5 (H) 06/16/2021 10:20 AM       A1C:  Lab Results   Component Value Date/Time    LABA1C 6.6 (H) 06/16/2021 10:20 AM       Lipid panel:  Lab Results   Component Value Date    CHOL 172 06/16/2021    CHOL 172 06/08/2020    CHOL 178 05/31/2019    TRIG 103 06/16/2021    TRIG 131 06/08/2020    TRIG 155 05/31/2019    HDL 43 06/16/2021    HDL 41 06/08/2020    HDL 39 05/31/2019        Lab Results   Component Value Date/Time    PROT 7.2 06/16/2021 10:20 AM    PROT 6.9 06/08/2020 09:35 AM    PROT 6.8 02/17/2017 09:52 AM       No results found for: MG      Assessment. Grace Sheffield was seen today for 3 month follow-up. Diagnoses and all orders for this visit:    Hyperlipidemia, unspecified hyperlipidemia type  -     COMPREHENSIVE METABOLIC PANEL; Future  -     LIPID PANEL; Future    Essential hypertension  -     COMPREHENSIVE METABOLIC PANEL; Future  -     LIPID PANEL; Future    Type 2 diabetes mellitus without complication, without long-term current use of insulin ([facility])  -     HEMOGLOBIN A1C; Future    Proteinuria, unspecified type    Primary osteoarthritis of both knees    CKD stage 3 secondary to diabetes ([facility]    Other orders  -     valsartan-hydroCHLOROthiazide (DIOVAN HCT) 160-12.5 MG per tablet; Take 1 tablet by mouth daily  -     metFORMIN (GLUCOPHAGE-XR) 500 MG extended release tablet; Take 1 tablet by mouth 3 times daily (with meals)  -     pravastatin (PRAVACHOL) 20 MG tablet;  Take 1 tablet by mouth daily       Patient Active Problem List   Diagnosis    Primary osteoarthritis of left knee    Primary osteoarthritis of right knee    Type 2 diabetes mellitus without complication, without long-term current use of insulin ([facility]    Essential hypertension    Hyperlipidemia       Plan:     Labs reviewed    Diabetes type 2 controlled A1c 6.6%  Continue Metformin as prescribed  Regular diet and exercise discussed      Hyperlipidemia cholesterol 172,  tolerating pravastatin increased to 20 mg to keep LDL lower      Proteinuria mild BUN/creatinine are okay  Patient on the valsartan tolerating well      DJD knees  Tylenol arthritis as needed      CKD 3 improved creatinine stable he is drinking more water he has good on the coffee he has cut down the pop  Ultrasound renal October 2018 no acute pathology    ED is doing better Cialis does help    Repeat CMP lipid A1c    Return in about 4 months (around 10/24/2021) for ANNUAL PHYSICAL.        Carola Castleman, MD  11:41 AM  6/24/2021     [place]

## 2021-10-27 DIAGNOSIS — E11.9 TYPE 2 DIABETES MELLITUS WITHOUT COMPLICATION, WITHOUT LONG-TERM CURRENT USE OF INSULIN (HCC): ICD-10-CM

## 2021-10-27 DIAGNOSIS — E78.5 HYPERLIPIDEMIA, UNSPECIFIED HYPERLIPIDEMIA TYPE: ICD-10-CM

## 2021-10-27 DIAGNOSIS — I10 ESSENTIAL HYPERTENSION: ICD-10-CM

## 2021-10-27 LAB
ALBUMIN SERPL-MCNC: 4.4 G/DL (ref 3.5–5.2)
ALP BLD-CCNC: 76 U/L (ref 40–129)
ALT SERPL-CCNC: 16 U/L (ref 0–40)
ANION GAP SERPL CALCULATED.3IONS-SCNC: 14 MMOL/L (ref 7–16)
AST SERPL-CCNC: 24 U/L (ref 0–39)
BILIRUB SERPL-MCNC: 0.7 MG/DL (ref 0–1.2)
BUN BLDV-MCNC: 19 MG/DL (ref 6–23)
CALCIUM SERPL-MCNC: 9.5 MG/DL (ref 8.6–10.2)
CHLORIDE BLD-SCNC: 99 MMOL/L (ref 98–107)
CHOLESTEROL, TOTAL: 169 MG/DL (ref 0–199)
CO2: 23 MMOL/L (ref 22–29)
CREAT SERPL-MCNC: 1.2 MG/DL (ref 0.7–1.2)
GFR AFRICAN AMERICAN: >60
GFR NON-AFRICAN AMERICAN: 59 ML/MIN/1.73
GLUCOSE BLD-MCNC: 149 MG/DL (ref 74–99)
HBA1C MFR BLD: 7 % (ref 4–5.6)
HDLC SERPL-MCNC: 42 MG/DL
LDL CHOLESTEROL CALCULATED: 98 MG/DL (ref 0–99)
POTASSIUM SERPL-SCNC: 4.6 MMOL/L (ref 3.5–5)
SODIUM BLD-SCNC: 136 MMOL/L (ref 132–146)
TOTAL PROTEIN: 6.3 G/DL (ref 6.4–8.3)
TRIGL SERPL-MCNC: 143 MG/DL (ref 0–149)
VLDLC SERPL CALC-MCNC: 29 MG/DL

## 2021-11-01 ENCOUNTER — OFFICE VISIT (OUTPATIENT)
Dept: FAMILY MEDICINE CLINIC | Age: 76
End: 2021-11-01
Payer: COMMERCIAL

## 2021-11-01 VITALS
BODY MASS INDEX: 33.2 KG/M2 | SYSTOLIC BLOOD PRESSURE: 138 MMHG | WEIGHT: 212 LBS | HEART RATE: 82 BPM | TEMPERATURE: 96.8 F | DIASTOLIC BLOOD PRESSURE: 72 MMHG | OXYGEN SATURATION: 97 %

## 2021-11-01 DIAGNOSIS — E11.9 TYPE 2 DIABETES MELLITUS WITHOUT COMPLICATION, WITHOUT LONG-TERM CURRENT USE OF INSULIN (HCC): ICD-10-CM

## 2021-11-01 DIAGNOSIS — K21.9 GASTROESOPHAGEAL REFLUX DISEASE WITHOUT ESOPHAGITIS: ICD-10-CM

## 2021-11-01 DIAGNOSIS — Z23 NEED FOR IMMUNIZATION AGAINST INFLUENZA: ICD-10-CM

## 2021-11-01 DIAGNOSIS — I10 ESSENTIAL HYPERTENSION: ICD-10-CM

## 2021-11-01 DIAGNOSIS — Z00.00 ANNUAL PHYSICAL EXAM: Primary | ICD-10-CM

## 2021-11-01 DIAGNOSIS — N52.9 ERECTILE DYSFUNCTION, UNSPECIFIED ERECTILE DYSFUNCTION TYPE: ICD-10-CM

## 2021-11-01 DIAGNOSIS — M17.11 PRIMARY OSTEOARTHRITIS OF RIGHT KNEE: ICD-10-CM

## 2021-11-01 PROCEDURE — 90471 IMMUNIZATION ADMIN: CPT | Performed by: INTERNAL MEDICINE

## 2021-11-01 PROCEDURE — 93000 ELECTROCARDIOGRAM COMPLETE: CPT | Performed by: INTERNAL MEDICINE

## 2021-11-01 PROCEDURE — 99397 PER PM REEVAL EST PAT 65+ YR: CPT | Performed by: INTERNAL MEDICINE

## 2021-11-01 PROCEDURE — 90694 VACC AIIV4 NO PRSRV 0.5ML IM: CPT | Performed by: INTERNAL MEDICINE

## 2021-11-01 RX ORDER — PANTOPRAZOLE SODIUM 40 MG/1
40 TABLET, DELAYED RELEASE ORAL
Qty: 90 TABLET | Refills: 1 | Status: SHIPPED | OUTPATIENT
Start: 2021-11-01 | End: 2022-03-21 | Stop reason: SDUPTHER

## 2021-11-01 RX ORDER — ATORVASTATIN CALCIUM 10 MG/1
10 TABLET, FILM COATED ORAL DAILY
Qty: 90 TABLET | Refills: 1 | Status: SHIPPED | OUTPATIENT
Start: 2021-11-01 | End: 2022-03-21 | Stop reason: SDUPTHER

## 2021-11-01 RX ORDER — METFORMIN HYDROCHLORIDE 500 MG/1
500 TABLET, EXTENDED RELEASE ORAL
Qty: 270 TABLET | Refills: 1 | Status: SHIPPED | OUTPATIENT
Start: 2021-11-01 | End: 2022-03-21 | Stop reason: SDUPTHER

## 2021-11-01 RX ORDER — VALSARTAN AND HYDROCHLOROTHIAZIDE 160; 12.5 MG/1; MG/1
1 TABLET, FILM COATED ORAL DAILY
Qty: 90 TABLET | Refills: 1 | Status: SHIPPED | OUTPATIENT
Start: 2021-11-01 | End: 2022-03-21 | Stop reason: SDUPTHER

## 2021-11-01 NOTE — PROGRESS NOTES
Subjective:     Chief Complaint   Patient presents with    Annual Exam   Patient here for annual exam,  Follow-up on the diabetes hypertension the lab report,    Knee pain is doing much better,    Complete physical performed    Complains of reflux symptoms for the past 5 years with the stomach, he tried Pepcid with some help,    Denies any dysphagia,    Past Medical History:   Diagnosis Date    Diabetes mellitus ([facility]     Erectile dysfunction     GERD (gastroesophageal reflux disease)     Hyperlipidemia     Hypertension     Type 2 diabetes mellitus without complication ([facility]         Social History     Socioeconomic History    Marital status:      Spouse name: Not on file    Number of children: Not on file    Years of education: Not on file    Highest education level: Not on file   Occupational History    Not on file   Tobacco Use    Smoking status: Never Smoker    Smokeless tobacco: Never Used   Substance and Sexual Activity    Alcohol use: No    Drug use: No    Sexual activity: Not on file   Other Topics Concern    Not on file   Social History Narrative    Not on file     Social Determinants of Health     Financial Resource Strain:     Difficulty of Paying Living Expenses:    Food Insecurity: No Food Insecurity    Worried About Running Out of Food in the Last Year: Never true    Tanika of Food in the Last Year: Never true   Transportation Needs: Unknown    Lack of Transportation (Medical): Not asked    Lack of Transportation (Non-Medical):  No   Physical Activity:     Days of Exercise per Week:     Minutes of Exercise per Session:    Stress:     Feeling of Stress :    Social Connections:     Frequency of Communication with Friends and Family:     Frequency of Social Gatherings with Friends and Family:     Attends [employer] Services:     Active Member of Clubs or Organizations:     Attends Club or Organization Meetings:     Marital Status:    Intimate Partner Violence:     Fear of Current or Ex-Partner:     Emotionally Abused:     Physically Abused:     Sexually Abused:         Past Surgical History:   Procedure Laterality Date    CARPAL TUNNEL RELEASE  2004 2005    EYE SURGERY      bilat cataract extracftions    HEMORRHOID SURGERY      TOENAIL EXCISION          Family History   Problem Relation Age of Onset    Coronary Art Dis Mother     Cancer Mother         UTERINE    Diabetes Mother     Other Father 72        AAA        Allergies   Allergen Reactions    Prednisone Other (See Comments)     \"symptoms of having a stroke\"        ROS  No acute distress  Cardiac: Denies any chest pain or palpitation  Denies any chest pain with exercise  He shovels snow without any angina  Does do yard work , without any difficulty   he can walk long distance without any chest pain    Respiratory: Denies any cough or shortness of breath  GI: No abdominal pain. Denies any nausea vomiting or diarrhea  Colonoscopy 2018 with Dr. Maldonado Garrison repeat in 23  : Denies any dysuria frequency or hematuria  Neuro: No headache or dizziness  Endocrine: History of diabetes  Skin: normal  No recent weight gain or weight loss  Denies any change in vision    Objective:    /72   Pulse 82   Temp 96.8 °F (36 °C)   Wt 212 lb (96.2 kg)   SpO2 97%   BMI 33.20 kg/m²     Constitutional: Alert awake and oriented  Eyes: Pupils equal bilaterally. Extraocular muscles intact  Neck: no JVD adenopathy no bruit  Heart:  RRR, no murmurs, gallops, or rubs.   Lungs:    no wheeze, rales or rhonchi  Abd: bowel sounds present, nontender, nondistended, no masses  Extrem:  No clubbing, cyanosis, or edema  Neuro: AAOx3,No Focal deficit  Psychological: no depression or anxiety     Rectal exam prostate normal stool Hemoccult negative    His feet exam was normal  Current Outpatient Medications   Medication Sig Dispense Refill    pantoprazole (PROTONIX) 40 MG tablet Take 1 tablet by mouth every morning (before breakfast) 90 tablet 1    atorvastatin (LIPITOR) 10 MG tablet Take 1 tablet by mouth daily 90 tablet 1    valsartan-hydroCHLOROthiazide (DIOVAN HCT) 160-12.5 MG per tablet Take 1 tablet by mouth daily 90 tablet 1    metFORMIN (GLUCOPHAGE-XR) 500 MG extended release tablet Take 1 tablet by mouth 3 times daily (with meals) 270 tablet 1    Cholecalciferol (VITAMIN D3) 50 MCG (2000 UT) CAPS Take by mouth      tadalafil (CIALIS) 20 MG tablet Take 1 tablet by mouth once a week 40 tablet 1    Omega-3 Fatty Acids (FISH OIL) 1000 MG CAPS Take 3,000 mg by mouth daily      vitamin B-1 (THIAMINE) 100 MG tablet Take 100 mg by mouth daily      ascorbic acid (VITAMIN C) 500 MG tablet Take 1,000 mg by mouth daily      Multiple Vitamins-Minerals (THERAPEUTIC MULTIVITAMIN-MINERALS) tablet Take 1 tablet by mouth daily      Misc Natural Products (GLUCOSAMINE CHOND COMPLEX/MSM PO) Take 2 tablets by mouth daily       No current facility-administered medications for this visit.         Last 3 BMP  Lab Results   Component Value Date/Time     10/27/2021 10:00 AM     06/16/2021 10:20 AM     07/22/2020 01:38 PM    K 4.6 10/27/2021 10:00 AM    K 4.7 06/16/2021 10:20 AM    K 4.4 07/22/2020 01:38 PM    CL 99 10/27/2021 10:00 AM     06/16/2021 10:20 AM     07/22/2020 01:38 PM    CO2 23 10/27/2021 10:00 AM    CO2 24 06/16/2021 10:20 AM    CO2 29 07/22/2020 01:38 PM    BUN 19 10/27/2021 10:00 AM    BUN 20 06/16/2021 10:20 AM    BUN 20 07/22/2020 01:38 PM    CREATININE 1.2 10/27/2021 10:00 AM    CREATININE 1.2 06/16/2021 10:20 AM    CREATININE 1.3 (H) 07/22/2020 01:38 PM    GLUCOSE 149 (H) 10/27/2021 10:00 AM    GLUCOSE 140 (H) 06/16/2021 10:20 AM    GLUCOSE 170 (H) 07/22/2020 01:38 PM    GLUCOSE 144 (H) 03/28/2012 09:41 AM    GLUCOSE 157 (H) 11/10/2010 09:18 AM    CALCIUM 9.5 10/27/2021 10:00 AM    CALCIUM 9.8 06/16/2021 10:20 AM    CALCIUM 9.6 07/22/2020 01:38 PM       Last 3 CMP:    Lab Results   Component Value Date/Time     10/27/2021 10:00 AM     06/16/2021 10:20 AM     07/22/2020 01:38 PM    K 4.6 10/27/2021 10:00 AM    K 4.7 06/16/2021 10:20 AM    K 4.4 07/22/2020 01:38 PM    CL 99 10/27/2021 10:00 AM     06/16/2021 10:20 AM     07/22/2020 01:38 PM    CO2 23 10/27/2021 10:00 AM    CO2 24 06/16/2021 10:20 AM    CO2 29 07/22/2020 01:38 PM    BUN 19 10/27/2021 10:00 AM    BUN 20 06/16/2021 10:20 AM    BUN 20 07/22/2020 01:38 PM    CREATININE 1.2 10/27/2021 10:00 AM    CREATININE 1.2 06/16/2021 10:20 AM    CREATININE 1.3 (H) 07/22/2020 01:38 PM    GLUCOSE 149 (H) 10/27/2021 10:00 AM    GLUCOSE 140 (H) 06/16/2021 10:20 AM    GLUCOSE 170 (H) 07/22/2020 01:38 PM    GLUCOSE 144 (H) 03/28/2012 09:41 AM    GLUCOSE 157 (H) 11/10/2010 09:18 AM    CALCIUM 9.5 10/27/2021 10:00 AM    CALCIUM 9.8 06/16/2021 10:20 AM    CALCIUM 9.6 07/22/2020 01:38 PM    PROT 6.3 (L) 10/27/2021 10:00 AM    PROT 7.2 06/16/2021 10:20 AM    PROT 6.9 06/08/2020 09:35 AM    LABALBU 4.4 10/27/2021 10:00 AM    LABALBU 4.6 06/16/2021 10:20 AM    LABALBU 4.4 06/08/2020 09:35 AM    LABALBU 4.3 03/28/2012 09:41 AM    LABALBU 4.2 11/10/2010 09:18 AM    BILITOT 0.7 10/27/2021 10:00 AM    BILITOT 0.6 06/16/2021 10:20 AM    BILITOT 0.7 06/08/2020 09:35 AM    ALKPHOS 76 10/27/2021 10:00 AM    ALKPHOS 82 06/16/2021 10:20 AM    ALKPHOS 63 06/08/2020 09:35 AM    AST 24 10/27/2021 10:00 AM    AST 25 06/16/2021 10:20 AM    AST 22 06/08/2020 09:35 AM    ALT 16 10/27/2021 10:00 AM    ALT 20 06/16/2021 10:20 AM    ALT 15 06/08/2020 09:35 AM        CBC:   Lab Results   Component Value Date/Time    WBC 6.3 06/16/2021 10:20 AM    RBC 4.68 06/16/2021 10:20 AM    HGB 14.5 06/16/2021 10:20 AM    HCT 44.1 06/16/2021 10:20 AM    MCV 94.2 06/16/2021 10:20 AM    MCH 31.0 06/16/2021 10:20 AM    MCHC 32.9 06/16/2021 10:20 AM    RDW 12.7 06/16/2021 10:20 AM     06/16/2021 10:20 AM    MPV 13.5 (H) 06/16/2021 10:20 AM       A1C:  Lab Results   Component Value Date/Time    LABA1C 7.0 (H) 10/27/2021 10:00 AM       Lipid panel:  Lab Results   Component Value Date    CHOL 169 10/27/2021    CHOL 172 06/16/2021    CHOL 172 06/08/2020    TRIG 143 10/27/2021    TRIG 103 06/16/2021    TRIG 131 06/08/2020    HDL 42 10/27/2021    HDL 43 06/16/2021    HDL 41 06/08/2020        Lab Results   Component Value Date/Time    PROT 6.3 (L) 10/27/2021 10:00 AM    PROT 7.2 06/16/2021 10:20 AM    PROT 6.9 06/08/2020 09:35 AM       No results found for: MG      Assessment. Erika Finley was seen today for annual exam.    Diagnoses and all orders for this visit:    Annual physical exam  -     EKG 12 lead; Future  -     EKG 12 lead    Need for immunization against influenza  -     INFLUENZA, QUADV, ADJUVANTED, 72 YRS =, IM, PF, PREFILL SYR, 0.5ML (FLUAD)    Gastroesophageal reflux disease without esophagitis  -     AFL - Kev Baker MD, Gastroenterology, [place]    Essential hypertension    Type 2 diabetes mellitus without complication, without long-term current use of insulin ([address]    Primary osteoarthritis of right knee    Erectile dysfunction, unspecified erectile dysfunction type    Other orders  -     pantoprazole (PROTONIX) 40 MG tablet; Take 1 tablet by mouth every morning (before breakfast)  -     atorvastatin (LIPITOR) 10 MG tablet; Take 1 tablet by mouth daily  -     valsartan-hydroCHLOROthiazide (DIOVAN HCT) 160-12.5 MG per tablet; Take 1 tablet by mouth daily  -     metFORMIN (GLUCOPHAGE-XR) 500 MG extended release tablet;  Take 1 tablet by mouth 3 times daily (with meals)       Patient Active Problem List   Diagnosis    Primary osteoarthritis of left knee    Primary osteoarthritis of right knee    Type 2 diabetes mellitus without complication, without long-term current use of insulin ([address]    Essential hypertension    Hyperlipidemia    CKD stage 3 secondary to diabetes [facility])       Plan: Annual physical examination clinically doing well    EKG normal sinus rhythm no acute changes    GE reflux disease Protonix 40 mg daily  Refer to Dr. Angelina Fernández because of chronicity of the problem    Hypertension good control continue valsartan HCTZ 160/12.5 daily      Diabetes type 2 A1c 7% increased from 6.6 last time  He is missing the evening Metformin  Advised to take the medication regularly  Continue diet modification and exercise    Osteoarthritis knee is doing much better    Cardiac function he uses Cialis as needed    Flu vaccine given    Patient had pneumonia vaccine and shingles vaccine    I will be retiring in 2 months  He will follow with the next physician  On the next visit  Return in about 4 months (around 3/1/2022).        Lazara Irving MD  11:11 AM  11/1/2021     [place]

## 2022-03-21 ENCOUNTER — OFFICE VISIT (OUTPATIENT)
Dept: FAMILY MEDICINE CLINIC | Age: 77
End: 2022-03-21
Payer: COMMERCIAL

## 2022-03-21 VITALS
WEIGHT: 214 LBS | BODY MASS INDEX: 33.59 KG/M2 | HEIGHT: 67 IN | OXYGEN SATURATION: 98 % | DIASTOLIC BLOOD PRESSURE: 80 MMHG | SYSTOLIC BLOOD PRESSURE: 126 MMHG | HEART RATE: 74 BPM

## 2022-03-21 DIAGNOSIS — N18.30 CKD STAGE 3 SECONDARY TO DIABETES (HCC): ICD-10-CM

## 2022-03-21 DIAGNOSIS — E55.9 HYPOVITAMINOSIS D: ICD-10-CM

## 2022-03-21 DIAGNOSIS — I10 ESSENTIAL HYPERTENSION: ICD-10-CM

## 2022-03-21 DIAGNOSIS — E11.22 CKD STAGE 3 SECONDARY TO DIABETES (HCC): ICD-10-CM

## 2022-03-21 DIAGNOSIS — E78.5 HYPERLIPIDEMIA, UNSPECIFIED HYPERLIPIDEMIA TYPE: ICD-10-CM

## 2022-03-21 DIAGNOSIS — M17.0 PRIMARY OSTEOARTHRITIS OF BOTH KNEES: ICD-10-CM

## 2022-03-21 DIAGNOSIS — E11.9 TYPE 2 DIABETES MELLITUS WITHOUT COMPLICATION, WITHOUT LONG-TERM CURRENT USE OF INSULIN (HCC): Primary | ICD-10-CM

## 2022-03-21 PROBLEM — M17.11 PRIMARY OSTEOARTHRITIS OF RIGHT KNEE: Status: RESOLVED | Noted: 2019-09-24 | Resolved: 2022-03-21

## 2022-03-21 LAB — HBA1C MFR BLD: 6.6 %

## 2022-03-21 PROCEDURE — 99214 OFFICE O/P EST MOD 30 MIN: CPT | Performed by: FAMILY MEDICINE

## 2022-03-21 PROCEDURE — 3044F HG A1C LEVEL LT 7.0%: CPT | Performed by: FAMILY MEDICINE

## 2022-03-21 PROCEDURE — 83036 HEMOGLOBIN GLYCOSYLATED A1C: CPT | Performed by: FAMILY MEDICINE

## 2022-03-21 RX ORDER — ATORVASTATIN CALCIUM 10 MG/1
10 TABLET, FILM COATED ORAL DAILY
Qty: 90 TABLET | Refills: 1 | Status: SHIPPED
Start: 2022-03-21 | End: 2022-08-30 | Stop reason: SDUPTHER

## 2022-03-21 RX ORDER — PANTOPRAZOLE SODIUM 40 MG/1
40 TABLET, DELAYED RELEASE ORAL
Qty: 90 TABLET | Refills: 1 | Status: SHIPPED | OUTPATIENT
Start: 2022-03-21

## 2022-03-21 RX ORDER — VALSARTAN AND HYDROCHLOROTHIAZIDE 160; 12.5 MG/1; MG/1
1 TABLET, FILM COATED ORAL DAILY
Qty: 90 TABLET | Refills: 1 | Status: SHIPPED
Start: 2022-03-21 | End: 2022-08-30 | Stop reason: SDUPTHER

## 2022-03-21 RX ORDER — METFORMIN HYDROCHLORIDE 500 MG/1
500 TABLET, EXTENDED RELEASE ORAL
Qty: 270 TABLET | Refills: 1 | Status: SHIPPED
Start: 2022-03-21 | End: 2022-08-30 | Stop reason: SDUPTHER

## 2022-03-21 SDOH — ECONOMIC STABILITY: FOOD INSECURITY: WITHIN THE PAST 12 MONTHS, THE FOOD YOU BOUGHT JUST DIDN'T LAST AND YOU DIDN'T HAVE MONEY TO GET MORE.: NEVER TRUE

## 2022-03-21 SDOH — ECONOMIC STABILITY: FOOD INSECURITY: WITHIN THE PAST 12 MONTHS, YOU WORRIED THAT YOUR FOOD WOULD RUN OUT BEFORE YOU GOT MONEY TO BUY MORE.: NEVER TRUE

## 2022-03-21 ASSESSMENT — PATIENT HEALTH QUESTIONNAIRE - PHQ9
2. FEELING DOWN, DEPRESSED OR HOPELESS: 0
SUM OF ALL RESPONSES TO PHQ QUESTIONS 1-9: 0
SUM OF ALL RESPONSES TO PHQ QUESTIONS 1-9: 0
1. LITTLE INTEREST OR PLEASURE IN DOING THINGS: 0
SUM OF ALL RESPONSES TO PHQ QUESTIONS 1-9: 0
SUM OF ALL RESPONSES TO PHQ QUESTIONS 1-9: 0
SUM OF ALL RESPONSES TO PHQ9 QUESTIONS 1 & 2: 0

## 2022-03-21 ASSESSMENT — SOCIAL DETERMINANTS OF HEALTH (SDOH): HOW HARD IS IT FOR YOU TO PAY FOR THE VERY BASICS LIKE FOOD, HOUSING, MEDICAL CARE, AND HEATING?: NOT HARD AT ALL

## 2022-03-21 NOTE — PATIENT INSTRUCTIONS
LOW SALT FOR BLOOD PRESSURE CONTROL. LOW CARBOHYDRATE FOR BLOOD SUGAR AND WEIGHT CONTROL. LOW FAT DIET FOR CHOLESTEROL CONTROL. DRINK ENOUGH FLUIDS FOR BETTER KIDNEY FUNCTION. TAKE DIOVAN//12.5 MG. DAILY  FOR BLOOD PRESSURE CONTROL. TAKE GLUCOPHAGE XR  1000 MG. 2 TIMES A DAY  FOR BLOOD SUGAR CONTROL. TAKE  LIPITOR 10 MG. NIGHTLY  FOR CHOLESTEROL CONTROL. Familia Palenciam TAKE PROTONIX 40 MG. DAILY FOR GASTRIC REFLUX PROBLEM. REGULAR WALKING ADVISED. ADVISED WEIGHT REDUCTION. FASTING FOR LAB WORK PRIOR TO NEXT VISIT. NEXT APPOINTMENT IN 2 MONTHS.

## 2022-03-21 NOTE — PROGRESS NOTES
OFFICE PROGRESS NOTE      SUBJECTIVE:        Patient ID:   Virginie Meza is a 67 y.o. male who presents for   Chief Complaint   Patient presents with    Established New Doctor           HPI:     WAS SEEING DR. Marcie Nevarez. RECHECK BP, CHOLESTEROL AND DIABETES. MEDICATION REFILL. KNEES HURT AT TIMES BUT GETTING AROUND OK. FEELS GOOD. WATCHING DIET GOOD. WALKING FOR EXERCISE. TAKING MEDICATIONS REGULARLY. Prior to Admission medications    Medication Sig Start Date End Date Taking?  Authorizing Provider   atorvastatin (LIPITOR) 10 MG tablet Take 1 tablet by mouth daily 3/21/22 6/19/22 Yes Jason Fernandez MD   metFORMIN (GLUCOPHAGE-XR) 500 MG extended release tablet Take 1 tablet by mouth 3 times daily (with meals) 3/21/22  Yes Jason Fernandez MD   pantoprazole (PROTONIX) 40 MG tablet Take 1 tablet by mouth every morning (before breakfast) 3/21/22  Yes Jason Fernandez MD   valsartan-hydroCHLOROthiazide (DIOVAN HCT) 160-12.5 MG per tablet Take 1 tablet by mouth daily 3/21/22  Yes Jason Fernandez MD   Cholecalciferol (VITAMIN D3) 50 MCG (2000 UT) CAPS Take by mouth   Yes Historical Provider, MD   tadalafil (CIALIS) 20 MG tablet Take 1 tablet by mouth once a week 2/8/21  Yes Joseph Sorenson MD   Omega-3 Fatty Acids (FISH OIL) 1000 MG CAPS Take 3,000 mg by mouth daily   Yes Historical Provider, MD   vitamin B-1 (THIAMINE) 100 MG tablet Take 100 mg by mouth daily   Yes Historical Provider, MD   Multiple Vitamins-Minerals (THERAPEUTIC MULTIVITAMIN-MINERALS) tablet Take 1 tablet by mouth daily   Yes Historical Provider, MD   Misc Natural Products (GLUCOSAMINE CHOND COMPLEX/MSM PO) Take 2 tablets by mouth daily   Yes Historical Provider, MD   ascorbic acid (VITAMIN C) 500 MG tablet Take 1,000 mg by mouth daily  Patient not taking: Reported on 3/21/2022    Historical Provider, MD     Social History     Socioeconomic History    Marital status:      Spouse name: None    Number of children: None    Years of education: None    Highest education level: None   Occupational History    None   Tobacco Use    Smoking status: Never Smoker    Smokeless tobacco: Never Used   Substance and Sexual Activity    Alcohol use: No    Drug use: No    Sexual activity: None   Other Topics Concern    None   Social History Narrative    None     Social Determinants of Health     Financial Resource Strain: Low Risk     Difficulty of Paying Living Expenses: Not hard at all   Food Insecurity: No Food Insecurity    Worried About Running Out of Food in the Last Year: Never true    [address] in the Last Year: Never true   Transportation Needs:     Lack of Transportation (Medical): Not on file    Lack of Transportation (Non-Medical):  Not on file   Physical Activity:     Days of Exercise per Week: Not on file    Minutes of Exercise per Session: Not on file   Stress:     Feeling of Stress : Not on file   Social Connections:     Frequency of Communication with Friends and Family: Not on file    Frequency of Social Gatherings with Friends and Family: Not on file    Attends Christian Services: Not on file    Active Member of [address] [place] [address] or Organizations: Not on file    Attends Club or Organization Meetings: Not on file    Marital Status: Not on file   Intimate Partner Violence:     Fear of Current or Ex-Partner: Not on file    Emotionally Abused: Not on file    Physically Abused: Not on file    Sexually Abused: Not on file   Housing Stability:     Unable to Pay for Housing in the Last Year: Not on file    Number of [place] in the Last Year: Not on file    Unstable Housing in the Last Year: Not on file       I have reviewed Alexander's allergies, medications, problem list, medical, social and family history and have updated as needed in the electronic medical record  Review Of Systems:    Skin: no abnormal pigmentation, rash, scaling, itching, masses, hair or nail changes  Eyes: no blurring, diplopia, or eye pain  Ears/Nose/Throat: no hearing loss, tinnitus, vertigo, nosebleed, nasal congestion, rhinorrhea, sore throat  Respiratory: no cough, pleuritic chest pain, dyspnea, or wheezing  Cardiovascular: no chest pain, angina, dyspnea on exertion, orthopnea, PND, palpitations, or claudication  Gastrointestinal: no nausea, vomiting, heartburn, diarrhea, constipation, bloating,  abdominal pain, or blood per rectum. Appetite is good  Genitourinary: no urinary urgency, frequency, dysuria, nocturia, hesitancy, or incontinence  Musculoskeletal: joint pains off and on. Morning stiffness. Ambulating well  Neurologic: no paralysis, paresis, paresthesia, seizures, tremors, or headaches  Hematologic/Lymphatic/Immunologic: no anemia, abnormal bleeding/bruising, fever, chills, night sweats, swollen glands, or unexplained weight loss  Endocrine: no heat or cold intolerance and no polyphagia, polydipsia, or polyuria        OBJECTIVE:     VS:  Wt Readings from Last 3 Encounters:   03/21/22 214 lb (97.1 kg)   11/01/21 212 lb (96.2 kg)   06/24/21 211 lb (95.7 kg)     Temp Readings from Last 3 Encounters:   11/01/21 96.8 °F (36 °C)   06/24/21 95.7 °F (35.4 °C)   02/08/21 97.5 °F (36.4 °C)     BP Readings from Last 3 Encounters:   03/21/22 126/80   11/01/21 138/72   06/24/21 130/74        General appearance: Alert, Awake, Oriented times 3, no distress  Skin: Warm and dry  Head: Normocephalic. No masses, lesions or tenderness noted  Eyes: Conjunctivae appear normal. PERLE  Ears: External ears normal  Nose/Sinuses: Nares normal. Septum midline. Mucosa normal. No drainage  Oropharynx: Oropharynx clear with no exudate seen  Neck: Neck supple. No jugular venous distension, lymphadenopathy or thyromegaly Trachea midline  Chest:  Normal. Movements are Normal and Equal.  Lungs: Lungs clear to auscultation bilaterally. No ronchi, crackles or wheezes  Heart: S1 S2  Regular rate and rhythm.  No rub, murmur or gallop  Abdomen: Abdomen soft, non-tender. BS normal. No masses, organomegaly. Back: Grossly Normal and Equal. DTR are Normal. SLR is Normal.  Extremities: Arthritic changes are noted. Movements are limited. Pedal pulses are normal.  Musculoskeletal: Muscular strength appears intact. No joint effusion, tenderness, swelling or warmth  Neuro:  No focal motor or sensory deficits        ASSESSMENT     Patient Active Problem List    Diagnosis Date Noted    Primary osteoarthritis of both knees 03/21/2022    CKD stage 3 secondary to diabetes ([facility] 06/24/2021    Type 2 diabetes mellitus without complication, without long-term current use of insulin ([facility] 02/08/2021    Essential hypertension 02/08/2021    Hyperlipidemia 02/08/2021        Diagnosis:     ICD-10-CM    1. Type 2 diabetes mellitus without complication, without long-term current use of insulin ([facility])  E11.9 POCT glycosylated hemoglobin (Hb A1C)    CONTROLLED   2. Essential hypertension  I10 CBC with Auto Differential    CONTROLLED   3. Hyperlipidemia, unspecified hyperlipidemia type  E78.5 Comprehensive Metabolic Panel     Lipid Panel    FAIR CONTORL   4. CKD stage 3 secondary to diabetes ([facility])  E11.22     N18.30     STABLE   5. Primary osteoarthritis of both knees  M17.0     STABLE   6. Hypovitaminosis D  E55.9 Vitamin D 25 Hydroxy       PLAN:           Patient Instructions   LOW SALT FOR BLOOD PRESSURE CONTROL. LOW CARBOHYDRATE FOR BLOOD SUGAR AND WEIGHT CONTROL. LOW FAT DIET FOR CHOLESTEROL CONTROL. DRINK ENOUGH FLUIDS FOR BETTER KIDNEY FUNCTION. TAKE   FOR BLOOD PRESSURE CONTROL. TAKE   FOR BLOOD SUGAR CONTROL. TAKE    FOR CHOLESTEROL CONTROL. Gweneangelica Latch TAKE  VITAMIN D-3 FOR IMPROVING DEFICIENCY. TAKE   FOR MOOD CONTROL. REGULAR WALKING ADVISED. ADVISED WEIGHT REDUCTION. NEXT APPOINTMENT IN 3 MONTHS. Return in about 2 months (around 5/21/2022) for FOLLOW UP VISIT.          I have reviewed my findings and recommendations with [place] Lena Fernandez.     Electronically signed by Marisa Hamm MD on 3/21/22 at 9:01 AM EDT

## 2022-05-20 DIAGNOSIS — E55.9 HYPOVITAMINOSIS D: ICD-10-CM

## 2022-05-20 DIAGNOSIS — I10 ESSENTIAL HYPERTENSION: ICD-10-CM

## 2022-05-20 DIAGNOSIS — E78.5 HYPERLIPIDEMIA, UNSPECIFIED HYPERLIPIDEMIA TYPE: ICD-10-CM

## 2022-05-20 LAB
ALBUMIN SERPL-MCNC: 4.4 G/DL (ref 3.5–5.2)
ALP BLD-CCNC: 83 U/L (ref 40–129)
ALT SERPL-CCNC: 20 U/L (ref 0–40)
ANION GAP SERPL CALCULATED.3IONS-SCNC: 15 MMOL/L (ref 7–16)
AST SERPL-CCNC: 26 U/L (ref 0–39)
BASOPHILS ABSOLUTE: 0.03 E9/L (ref 0–0.2)
BASOPHILS RELATIVE PERCENT: 0.6 % (ref 0–2)
BILIRUB SERPL-MCNC: 1.2 MG/DL (ref 0–1.2)
BUN BLDV-MCNC: 19 MG/DL (ref 6–23)
CALCIUM SERPL-MCNC: 8.9 MG/DL (ref 8.6–10.2)
CHLORIDE BLD-SCNC: 101 MMOL/L (ref 98–107)
CHOLESTEROL, TOTAL: 134 MG/DL (ref 0–199)
CO2: 25 MMOL/L (ref 22–29)
CREAT SERPL-MCNC: 1.4 MG/DL (ref 0.7–1.2)
EOSINOPHILS ABSOLUTE: 0.11 E9/L (ref 0.05–0.5)
EOSINOPHILS RELATIVE PERCENT: 2.2 % (ref 0–6)
GFR AFRICAN AMERICAN: 59
GFR NON-AFRICAN AMERICAN: 49 ML/MIN/1.73
GLUCOSE BLD-MCNC: 124 MG/DL (ref 74–99)
HCT VFR BLD CALC: 41.4 % (ref 37–54)
HDLC SERPL-MCNC: 42 MG/DL
HEMOGLOBIN: 13.6 G/DL (ref 12.5–16.5)
IMMATURE GRANULOCYTES #: 0.02 E9/L
IMMATURE GRANULOCYTES %: 0.4 % (ref 0–5)
LDL CHOLESTEROL CALCULATED: 69 MG/DL (ref 0–99)
LYMPHOCYTES ABSOLUTE: 1.18 E9/L (ref 1.5–4)
LYMPHOCYTES RELATIVE PERCENT: 23.6 % (ref 20–42)
MCH RBC QN AUTO: 31.6 PG (ref 26–35)
MCHC RBC AUTO-ENTMCNC: 32.9 % (ref 32–34.5)
MCV RBC AUTO: 96.3 FL (ref 80–99.9)
MONOCYTES ABSOLUTE: 0.47 E9/L (ref 0.1–0.95)
MONOCYTES RELATIVE PERCENT: 9.4 % (ref 2–12)
NEUTROPHILS ABSOLUTE: 3.2 E9/L (ref 1.8–7.3)
NEUTROPHILS RELATIVE PERCENT: 63.8 % (ref 43–80)
PDW BLD-RTO: 13 FL (ref 11.5–15)
PLATELET # BLD: 153 E9/L (ref 130–450)
PMV BLD AUTO: 12.9 FL (ref 7–12)
POTASSIUM SERPL-SCNC: 3.8 MMOL/L (ref 3.5–5)
RBC # BLD: 4.3 E12/L (ref 3.8–5.8)
SODIUM BLD-SCNC: 141 MMOL/L (ref 132–146)
TOTAL PROTEIN: 6.5 G/DL (ref 6.4–8.3)
TRIGL SERPL-MCNC: 113 MG/DL (ref 0–149)
VITAMIN D 25-HYDROXY: 35 NG/ML (ref 30–100)
VLDLC SERPL CALC-MCNC: 23 MG/DL
WBC # BLD: 5 E9/L (ref 4.5–11.5)

## 2022-05-25 ENCOUNTER — OFFICE VISIT (OUTPATIENT)
Dept: FAMILY MEDICINE CLINIC | Age: 77
End: 2022-05-25
Payer: COMMERCIAL

## 2022-05-25 VITALS
HEART RATE: 85 BPM | SYSTOLIC BLOOD PRESSURE: 126 MMHG | DIASTOLIC BLOOD PRESSURE: 70 MMHG | TEMPERATURE: 97.6 F | BODY MASS INDEX: 33.36 KG/M2 | WEIGHT: 213 LBS | OXYGEN SATURATION: 98 %

## 2022-05-25 DIAGNOSIS — E11.22 CKD STAGE 3 SECONDARY TO DIABETES (HCC): ICD-10-CM

## 2022-05-25 DIAGNOSIS — E78.5 HYPERLIPIDEMIA, UNSPECIFIED HYPERLIPIDEMIA TYPE: ICD-10-CM

## 2022-05-25 DIAGNOSIS — N18.30 CKD STAGE 3 SECONDARY TO DIABETES (HCC): ICD-10-CM

## 2022-05-25 DIAGNOSIS — E55.9 HYPOVITAMINOSIS D: ICD-10-CM

## 2022-05-25 DIAGNOSIS — N52.9 ERECTILE DYSFUNCTION, UNSPECIFIED ERECTILE DYSFUNCTION TYPE: ICD-10-CM

## 2022-05-25 DIAGNOSIS — I10 ESSENTIAL HYPERTENSION: ICD-10-CM

## 2022-05-25 DIAGNOSIS — E11.9 TYPE 2 DIABETES MELLITUS WITHOUT COMPLICATION, WITHOUT LONG-TERM CURRENT USE OF INSULIN (HCC): Primary | ICD-10-CM

## 2022-05-25 PROCEDURE — 99213 OFFICE O/P EST LOW 20 MIN: CPT | Performed by: FAMILY MEDICINE

## 2022-05-25 PROCEDURE — 3044F HG A1C LEVEL LT 7.0%: CPT | Performed by: FAMILY MEDICINE

## 2022-05-25 PROCEDURE — 1123F ACP DISCUSS/DSCN MKR DOCD: CPT | Performed by: FAMILY MEDICINE

## 2022-05-25 RX ORDER — TADALAFIL 20 MG/1
20 TABLET ORAL WEEKLY
Qty: 40 TABLET | Refills: 1 | Status: SHIPPED | OUTPATIENT
Start: 2022-05-25 | End: 2022-06-01 | Stop reason: SDUPTHER

## 2022-05-25 NOTE — PROGRESS NOTES
OFFICE PROGRESS NOTE      SUBJECTIVE:        Patient ID:   Jacky Sales is a 67 y.o. male who presents for   Chief Complaint   Patient presents with    Hypertension    Diabetes    Discuss Labs           HPI:     RECHECK BP, CHOLESTEROL AND DIABETES. DISCUSS LAB. RESULTS. MEDICATION REFILL. FEELS GOOD. WATCHING DIET GOOD. WALKING FOR EXERCISE. TAKING MEDICATIONS REGULARLY. Prior to Admission medications    Medication Sig Start Date End Date Taking?  Authorizing Provider   tadalafil (CIALIS) 20 MG tablet Take 1 tablet by mouth once a week 5/25/22  Yes Elo Wynne MD   atorvastatin (LIPITOR) 10 MG tablet Take 1 tablet by mouth daily 3/21/22 6/19/22 Yes Elo Wynne MD   metFORMIN (GLUCOPHAGE-XR) 500 MG extended release tablet Take 1 tablet by mouth 3 times daily (with meals) 3/21/22  Yes Elo Wynne MD   pantoprazole (PROTONIX) 40 MG tablet Take 1 tablet by mouth every morning (before breakfast) 3/21/22  Yes Elo Wynne MD   valsartan-hydroCHLOROthiazide (DIOVAN HCT) 160-12.5 MG per tablet Take 1 tablet by mouth daily 3/21/22  Yes Elo Wynne MD   Cholecalciferol (VITAMIN D3) 50 MCG (2000 UT) CAPS Take by mouth   Yes Historical Provider, MD   Omega-3 Fatty Acids (FISH OIL) 1000 MG CAPS Take 3,000 mg by mouth daily   Yes Historical Provider, MD   vitamin B-1 (THIAMINE) 100 MG tablet Take 100 mg by mouth daily   Yes Historical Provider, MD   ascorbic acid (VITAMIN C) 500 MG tablet Take 1,000 mg by mouth daily    Yes Historical Provider, MD   Multiple Vitamins-Minerals (THERAPEUTIC MULTIVITAMIN-MINERALS) tablet Take 1 tablet by mouth daily   Yes Historical Provider, MD   Misc Natural Products (GLUCOSAMINE CHOND COMPLEX/MSM PO) Take 2 tablets by mouth daily   Yes Historical Provider, MD     Social History     Socioeconomic History    Marital status:      Spouse name: None    Number of children: None    Years of education: None    Highest education level: None   Occupational History    None   Tobacco Use    Smoking status: Never Smoker    Smokeless tobacco: Never Used   Substance and Sexual Activity    Alcohol use: No    Drug use: No    Sexual activity: None   Other Topics Concern    None   Social History Narrative    None     Social Determinants of Health     Financial Resource Strain: Low Risk     Difficulty of Paying Living Expenses: Not hard at all   Food Insecurity: No Food Insecurity    Worried About Running Out of Food in the Last Year: Never true    Tanika of Food in the Last Year: Never true   Transportation Needs:     Lack of Transportation (Medical): Not on file    Lack of Transportation (Non-Medical):  Not on file   Physical Activity:     Days of Exercise per Week: Not on file    Minutes of Exercise per Session: Not on file   Stress:     Feeling of Stress : Not on file   Social Connections:     Frequency of Communication with Friends and Family: Not on file    Frequency of Social Gatherings with Friends and Family: Not on file    Attends Mu-ism Services: Not on file    Active Member of [address] [place] [address] or Organizations: Not on file    Attends Club or Organization Meetings: Not on file    Marital Status: Not on file   Intimate Partner Violence:     Fear of Current or Ex-Partner: Not on file    Emotionally Abused: Not on file    Physically Abused: Not on file    Sexually Abused: Not on file   Housing Stability:     Unable to Pay for Housing in the Last Year: Not on file    Number of [place] in the Last Year: Not on file    Unstable Housing in the Last Year: Not on file       I have reviewed Jaifalgunimarni's allergies, medications, problem list, medical, social and family history and have updated as needed in the electronic medical record  Review Of Systems:    Skin: no abnormal pigmentation, rash, scaling, itching, masses, hair or nail changes  Eyes: no blurring, diplopia, or eye pain  Ears/Nose/Throat: no hearing loss, tinnitus, vertigo, nosebleed, nasal congestion, rhinorrhea, sore throat  Respiratory: no cough, pleuritic chest pain, dyspnea, or wheezing  Cardiovascular: no chest pain, angina, dyspnea on exertion, orthopnea, PND, palpitations, or claudication  Gastrointestinal: no nausea, vomiting, heartburn, diarrhea, constipation, bloating,  abdominal pain, or blood per rectum. Appetite is good  Genitourinary: no urinary urgency, frequency, dysuria, nocturia, hesitancy, or incontinence  Musculoskeletal: joint pains off and on. Morning stiffness. Ambulating well  Neurologic: no paralysis, paresis, paresthesia, seizures, tremors, or headaches  Hematologic/Lymphatic/Immunologic: no anemia, abnormal bleeding/bruising, fever, chills, night sweats, swollen glands, or unexplained weight loss  Endocrine: no heat or cold intolerance and no polyphagia, polydipsia, or polyuria        OBJECTIVE:     VS:  Wt Readings from Last 3 Encounters:   05/25/22 213 lb (96.6 kg)   03/21/22 214 lb (97.1 kg)   11/01/21 212 lb (96.2 kg)     Temp Readings from Last 3 Encounters:   05/25/22 97.6 °F (36.4 °C)   11/01/21 96.8 °F (36 °C)   06/24/21 95.7 °F (35.4 °C)     BP Readings from Last 3 Encounters:   05/25/22 126/70   03/21/22 126/80   11/01/21 138/72        General appearance: Alert, Awake, Oriented times 3, no distress  Skin: Warm and dry  Head: Normocephalic. No masses, lesions or tenderness noted  Eyes: Conjunctivae appear normal. PERLE  Ears: External ears normal  Nose/Sinuses: Nares normal. Septum midline. Mucosa normal. No drainage  Oropharynx: Oropharynx clear with no exudate seen  Neck: Neck supple. No jugular venous distension, lymphadenopathy or thyromegaly Trachea midline  Chest:  Normal. Movements are Normal and Equal.  Lungs: Lungs clear to auscultation bilaterally. No ronchi, crackles or wheezes  Heart: S1 S2  Regular rate and rhythm. No rub, murmur or gallop  Abdomen: Abdomen soft, non-tender.  BS normal. No masses, organomegaly. Back: Grossly Normal and Equal. DTR are Normal. SLR is Normal.  Extremities: Arthritic changes are noted. Movements are limited. Pedal pulses are normal.  Musculoskeletal: Muscular strength appears intact. No joint effusion, tenderness, swelling or warmth  Neuro:  No focal motor or sensory deficits        ASSESSMENT     Patient Active Problem List    Diagnosis Date Noted    Primary osteoarthritis of both knees 03/21/2022    CKD stage 3 secondary to diabetes ([facility] 06/24/2021    Type 2 diabetes mellitus without complication, without long-term current use of insulin ([facility] 02/08/2021    Essential hypertension 02/08/2021    Hyperlipidemia 02/08/2021        Diagnosis:     ICD-10-CM    1. Type 2 diabetes mellitus without complication, without long-term current use of insulin ([facility])  E11.9     CONTROLLED   2. Essential hypertension  I10     CONTROLLED   3. Hyperlipidemia, unspecified hyperlipidemia type  E78.5     CONTROLLED   4. CKD stage 3 secondary to diabetes ([facility])  E11.22     N18.30     STABLE   5. Hypovitaminosis D  E55.9     IMPROVED   6. Erectile dysfunction, unspecified erectile dysfunction type  N52.9     STABLE. PLAN:        LABORATORY RESULTS 5/20/2022 REVIEWED AND DISCUSSED. Patient Instructions   LOW SALT FOR BLOOD PRESSURE CONTROL. LOW CARBOHYDRATE FOR BLOOD SUGAR AND WEIGHT CONTROL. LOW FAT DIET FOR CHOLESTEROL CONTROL. DRINK ENOUGH FLUIDS FOR BETTER KIDNEY FUNCTION. TAKE VALSARTAN/-25 DAILY   FOR BLOOD PRESSURE CONTROL. TAKE METFORMIN  MG. 3 TIMES A DAY  FOR BLOOD SUGAR CONTROL. TAKE LIPITOR 10 MG. NIGHTLY   FOR CHOLESTEROL CONTROL. [place]y [place] TAKE  CIALIS 20 MG. AS NEEDED FOR ERECTION PROBLEM. REGULAR WALKING ADVISED. ADVISED WEIGHT REDUCTION. NEXT APPOINTMENT IN 3 MONTHS. Return in about 3 months (around 8/25/2022) for FOLLOW UP VISIT. I have reviewed my findings and recommendations with Ananth Heck.     Electronically signed by Darius Reich MD on 5/25/22 at 9:56 AM EDT

## 2022-05-25 NOTE — PATIENT INSTRUCTIONS
LOW SALT FOR BLOOD PRESSURE CONTROL. LOW CARBOHYDRATE FOR BLOOD SUGAR AND WEIGHT CONTROL. LOW FAT DIET FOR CHOLESTEROL CONTROL. DRINK ENOUGH FLUIDS FOR BETTER KIDNEY FUNCTION. TAKE VALSARTAN/-25 DAILY   FOR BLOOD PRESSURE CONTROL. TAKE METFORMIN  MG. 3 TIMES A DAY  FOR BLOOD SUGAR CONTROL. TAKE LIPITOR 10 MG. NIGHTLY   FOR CHOLESTEROL CONTROL. Blanchie Bevels TAKE  CIALIS 20 MG. AS NEEDED FOR ERECTION PROBLEM. REGULAR WALKING ADVISED. ADVISED WEIGHT REDUCTION. NEXT APPOINTMENT IN 3 MONTHS.

## 2022-06-02 RX ORDER — TADALAFIL 20 MG/1
20 TABLET ORAL PRN
Qty: 40 TABLET | Refills: 1 | Status: SHIPPED | OUTPATIENT
Start: 2022-06-02

## 2022-07-08 ENCOUNTER — TELEPHONE (OUTPATIENT)
Dept: FAMILY MEDICINE CLINIC | Age: 77
End: 2022-07-08

## 2022-07-08 NOTE — TELEPHONE ENCOUNTER
----- Message from Salvatore Ellis sent at 7/8/2022  9:51 AM EDT -----  Subject: Message to Provider    QUESTIONS  Information for Provider? urgent? patient request if the nurse Judy Salguero can   called patient has symptoms congestion chest ,weakness , coughing patient   was tested positive for covid on 7/7/21   ---------------------------------------------------------------------------  --------------  Ky Fraction INFO  [phone]; OK to leave message on voicemail  ---------------------------------------------------------------------------  --------------  SCRIPT ANSWERS  Relationship to Patient?  Self

## 2022-07-08 NOTE — TELEPHONE ENCOUNTER
Patient positive for covid 7/7/2022. He has cough congestion and feels weak. His wife started Paxlovid 2 days ago and feels better.  He would like to start medication

## 2022-08-03 ENCOUNTER — TELEPHONE (OUTPATIENT)
Dept: FAMILY MEDICINE CLINIC | Age: 77
End: 2022-08-03

## 2022-08-03 DIAGNOSIS — H91.93 DECREASED HEARING OF BOTH EARS: Primary | ICD-10-CM

## 2022-08-03 NOTE — TELEPHONE ENCOUNTER
----- Message from Ilene Mauricio sent at 8/3/2022 11:03 AM EDT -----  Subject: Referral Request    Reason for referral request? pt needs order for a hearing aid, [facility], phone number is [phone], pt already has an appointment set up   on september 28th but needs referral sent to them  Provider patient wants to be referred to(if known):     Provider Phone Number(if known):     Additional Information for Provider?   ---------------------------------------------------------------------------  --------------  [facility]    [phone]; OK to leave message on voicemail  ---------------------------------------------------------------------------  --------------

## 2022-08-30 ENCOUNTER — OFFICE VISIT (OUTPATIENT)
Dept: FAMILY MEDICINE CLINIC | Age: 77
End: 2022-08-30
Payer: COMMERCIAL

## 2022-08-30 VITALS
DIASTOLIC BLOOD PRESSURE: 80 MMHG | HEART RATE: 85 BPM | WEIGHT: 222 LBS | OXYGEN SATURATION: 96 % | SYSTOLIC BLOOD PRESSURE: 136 MMHG | TEMPERATURE: 97 F | BODY MASS INDEX: 34.77 KG/M2

## 2022-08-30 DIAGNOSIS — E11.22 CKD STAGE 3 SECONDARY TO DIABETES (HCC): ICD-10-CM

## 2022-08-30 DIAGNOSIS — I10 ESSENTIAL HYPERTENSION: ICD-10-CM

## 2022-08-30 DIAGNOSIS — E55.9 HYPOVITAMINOSIS D: ICD-10-CM

## 2022-08-30 DIAGNOSIS — E11.9 TYPE 2 DIABETES MELLITUS WITHOUT COMPLICATION, WITHOUT LONG-TERM CURRENT USE OF INSULIN (HCC): Primary | ICD-10-CM

## 2022-08-30 DIAGNOSIS — N18.30 CKD STAGE 3 SECONDARY TO DIABETES (HCC): ICD-10-CM

## 2022-08-30 PROBLEM — E78.2 MIXED HYPERCHOLESTEROLEMIA AND HYPERTRIGLYCERIDEMIA: Status: ACTIVE | Noted: 2021-02-08

## 2022-08-30 LAB — HBA1C MFR BLD: 6.9 %

## 2022-08-30 PROCEDURE — 1123F ACP DISCUSS/DSCN MKR DOCD: CPT | Performed by: FAMILY MEDICINE

## 2022-08-30 PROCEDURE — 83036 HEMOGLOBIN GLYCOSYLATED A1C: CPT | Performed by: FAMILY MEDICINE

## 2022-08-30 PROCEDURE — 99214 OFFICE O/P EST MOD 30 MIN: CPT | Performed by: FAMILY MEDICINE

## 2022-08-30 PROCEDURE — 3044F HG A1C LEVEL LT 7.0%: CPT | Performed by: FAMILY MEDICINE

## 2022-08-30 RX ORDER — METFORMIN HYDROCHLORIDE 500 MG/1
500 TABLET, EXTENDED RELEASE ORAL
Qty: 270 TABLET | Refills: 1 | Status: SHIPPED | OUTPATIENT
Start: 2022-08-30

## 2022-08-30 RX ORDER — ATORVASTATIN CALCIUM 10 MG/1
10 TABLET, FILM COATED ORAL DAILY
Qty: 90 TABLET | Refills: 1 | Status: SHIPPED | OUTPATIENT
Start: 2022-08-30 | End: 2022-11-28

## 2022-08-30 RX ORDER — VALSARTAN AND HYDROCHLOROTHIAZIDE 160; 12.5 MG/1; MG/1
1 TABLET, FILM COATED ORAL DAILY
Qty: 90 TABLET | Refills: 1 | Status: SHIPPED | OUTPATIENT
Start: 2022-08-30

## 2022-08-30 NOTE — PATIENT INSTRUCTIONS
LOW SALT FOR BLOOD PRESSURE CONTROL. LOW CARBOHYDRATE FOR BLOOD SUGAR AND WEIGHT CONTROL. LOW FAT DIET FOR CHOLESTEROL CONTROL. DRINK ENOUGH FLUIDS FOR BETTER KIDNEY FUNCTION. TAKE VALSARTAN/-25 DAILY   FOR BLOOD PRESSURE CONTROL. TAKE METFORMIN  MG. 3 TIMES A DAY  FOR BLOOD SUGAR CONTROL. TAKE LIPITOR 10 MG. NIGHTLY   FOR CHOLESTEROL CONTROL. Beyer Copping TAKE  CIALIS 20 MG. AS NEEDED FOR ERECTION PROBLEM. REGULAR WALKING ADVISED. ADVISED WEIGHT REDUCTION. FASTING FOR LAB WORK PRIOR TO NEXT VISIT. NEXT APPOINTMENT IN 3 MONTHS.

## 2022-08-30 NOTE — PROGRESS NOTES
OFFICE PROGRESS NOTE      SUBJECTIVE:        Patient ID:   Kimmy Skinner is a 65 y.o. male who presents for   Chief Complaint   Patient presents with    Hypertension    Cholesterol Problem           HPI:     RECHECK VITAMIN D, BP, CHOLESTEROL AND DIABETES. MEDICATION REFILL. FEELS GOOD. WATCHING DIET GOOD. WALKING FOR EXERCISE. TAKING MEDICATIONS REGULARLY. Prior to Admission medications    Medication Sig Start Date End Date Taking?  Authorizing Provider   atorvastatin (LIPITOR) 10 MG tablet Take 1 tablet by mouth daily 8/30/22 11/28/22 Yes Joey Ponce MD   metFORMIN (GLUCOPHAGE-XR) 500 MG extended release tablet Take 1 tablet by mouth 3 times daily (with meals) 8/30/22  Yes Joey Ponce MD   valsartan-hydroCHLOROthiazide (DIOVAN HCT) 160-12.5 MG per tablet Take 1 tablet by mouth daily 8/30/22  Yes Joey Ponce MD   tadalafil (CIALIS) 20 MG tablet Take 1 tablet by mouth as needed for Erectile Dysfunction 6/2/22   Joey Ponce MD   pantoprazole (PROTONIX) 40 MG tablet Take 1 tablet by mouth every morning (before breakfast) 3/21/22   Joey Ponce MD   Cholecalciferol (VITAMIN D3) 50 MCG (2000 UT) CAPS Take by mouth    Historical Provider, MD   Omega-3 Fatty Acids (FISH OIL) 1000 MG CAPS Take 3,000 mg by mouth daily    Historical Provider, MD   vitamin B-1 (THIAMINE) 100 MG tablet Take 100 mg by mouth daily    Historical Provider, MD   ascorbic acid (VITAMIN C) 500 MG tablet Take 1,000 mg by mouth daily     Historical Provider, MD   Multiple Vitamins-Minerals (THERAPEUTIC MULTIVITAMIN-MINERALS) tablet Take 1 tablet by mouth daily    Historical Provider, MD   Misc Natural Products (GLUCOSAMINE CHOND COMPLEX/MSM PO) Take 2 tablets by mouth daily    Historical Provider, MD     Social History     Socioeconomic History    Marital status:    Tobacco Use    Smoking status: Never    Smokeless tobacco: Never   Substance and Sexual Activity    Alcohol use: No    Drug use: No     Social Determinants of Health     Financial Resource Strain: Low Risk     Difficulty of Paying Living Expenses: Not hard at all   Food Insecurity: No Food Insecurity    Worried About Running Out of Food in the Last Year: Never true    Ran Out of Food in the Last Year: Never true       I have reviewed Alexander's allergies, medications, problem list, medical, social and family history and have updated as needed in the electronic medical record  Review Of Systems:    Skin: no abnormal pigmentation, rash, scaling, itching, masses, hair or nail changes  Eyes: no blurring, diplopia, or eye pain  Ears/Nose/Throat: no hearing loss, tinnitus, vertigo, nosebleed, nasal congestion, rhinorrhea, sore throat  Respiratory: no cough, pleuritic chest pain, dyspnea, or wheezing  Cardiovascular: no chest pain, angina, dyspnea on exertion, orthopnea, PND, palpitations, or claudication  Gastrointestinal: no nausea, vomiting, heartburn, diarrhea, constipation, bloating,  abdominal pain, or blood per rectum. Appetite is good  Genitourinary: no urinary urgency, frequency, dysuria, nocturia, hesitancy, or incontinence  Musculoskeletal: joint pains off and on. Morning stiffness.  Ambulating well  Neurologic: no paralysis, paresis, paresthesia, seizures, tremors, or headaches  Hematologic/Lymphatic/Immunologic: no anemia, abnormal bleeding/bruising, fever, chills, night sweats, swollen glands, or unexplained weight loss  Endocrine: no heat or cold intolerance and no polyphagia, polydipsia, or polyuria        OBJECTIVE:     VS:  Wt Readings from Last 3 Encounters:   08/30/22 222 lb (100.7 kg)   05/25/22 213 lb (96.6 kg)   03/21/22 214 lb (97.1 kg)     Temp Readings from Last 3 Encounters:   08/30/22 97 °F (36.1 °C)   05/25/22 97.6 °F (36.4 °C)   11/01/21 96.8 °F (36 °C)     BP Readings from Last 3 Encounters:   08/30/22 136/80   05/25/22 126/70   03/21/22 126/80        General appearance: Alert, Awake, Oriented times 3, no distress  Skin: Warm and dry  Head: Normocephalic. No masses, lesions or tenderness noted  Eyes: Conjunctivae appear normal. PERLE  Ears: External ears normal  Nose/Sinuses: Nares normal. Septum midline. Mucosa normal. No drainage  Oropharynx: Oropharynx clear with no exudate seen  Neck: Neck supple. No jugular venous distension, lymphadenopathy or thyromegaly Trachea midline  Chest:  Normal. Movements are Normal and Equal.  Lungs: Lungs clear to auscultation bilaterally. No ronchi, crackles or wheezes  Heart: S1 S2  Regular rate and rhythm. No rub, murmur or gallop  Abdomen: Abdomen soft, non-tender. BS normal. No masses, organomegaly. Back: Grossly Normal and Equal. DTR are Normal. SLR is Normal.  Extremities: Arthritic changes are noted. Movements are limited. Pedal pulses are normal.  Musculoskeletal: Muscular strength appears intact. No joint effusion, tenderness, swelling or warmth  Neuro:  No focal motor or sensory deficits        ASSESSMENT     Patient Active Problem List    Diagnosis Date Noted    Mixed hypercholesterolemia and hypertriglyceridemia 02/08/2021    Hypovitaminosis D 08/30/2022    Primary osteoarthritis of both knees 03/21/2022    CKD stage 3 secondary to diabetes ([facility] [address] 06/24/2021    Type 2 diabetes mellitus without complication, without long-term current use of insulin ([facility] [address] 02/08/2021    Essential hypertension 02/08/2021        Diagnosis:     ICD-10-CM    1. Type 2 diabetes mellitus without complication, without long-term current use of insulin ([facility])  E11.9 POCT glycosylated hemoglobin (Hb A1C)    CONTROLLED      2. Essential hypertension  I10 Comprehensive Metabolic Panel     Lipid Panel    CONTROLLED      3. CKD stage 3 secondary to diabetes ([facility])  E11.22     N18.30     STABLE      4. Hypovitaminosis D  E55.9 Vitamin D 25 Hydroxy    IMPROVING          PLAN:           Patient Instructions   LOW SALT FOR BLOOD PRESSURE CONTROL.   LOW CARBOHYDRATE FOR BLOOD SUGAR AND WEIGHT CONTROL. LOW FAT DIET FOR CHOLESTEROL CONTROL. DRINK ENOUGH FLUIDS FOR BETTER KIDNEY FUNCTION. TAKE VALSARTAN/-25 DAILY   FOR BLOOD PRESSURE CONTROL. TAKE METFORMIN  MG. 3 TIMES A DAY  FOR BLOOD SUGAR CONTROL. TAKE LIPITOR 10 MG. NIGHTLY   FOR CHOLESTEROL CONTROL. Raymond Hinojosa TAKE  CIALIS 20 MG. AS NEEDED FOR ERECTION PROBLEM. REGULAR WALKING ADVISED. ADVISED WEIGHT REDUCTION. FASTING FOR LAB WORK PRIOR TO NEXT VISIT. NEXT APPOINTMENT IN 3 MONTHS. Return in about 3 months (around 11/30/2022) for FOLLOW UP VISIT. I have reviewed my findings and recommendations with Reynlod Goins.     Electronically signed by Vance Miller MD on 8/30/22 at 10:08 AM EDT

## 2022-09-15 ENCOUNTER — OFFICE VISIT (OUTPATIENT)
Dept: FAMILY MEDICINE CLINIC | Age: 77
End: 2022-09-15
Payer: COMMERCIAL

## 2022-09-15 VITALS
WEIGHT: 222 LBS | OXYGEN SATURATION: 96 % | HEART RATE: 96 BPM | DIASTOLIC BLOOD PRESSURE: 80 MMHG | SYSTOLIC BLOOD PRESSURE: 136 MMHG | TEMPERATURE: 97 F | BODY MASS INDEX: 34.77 KG/M2

## 2022-09-15 DIAGNOSIS — N18.30 CKD STAGE 3 SECONDARY TO DIABETES (HCC): ICD-10-CM

## 2022-09-15 DIAGNOSIS — E11.9 TYPE 2 DIABETES MELLITUS WITHOUT COMPLICATION, WITHOUT LONG-TERM CURRENT USE OF INSULIN (HCC): ICD-10-CM

## 2022-09-15 DIAGNOSIS — M54.50 ACUTE LEFT-SIDED LOW BACK PAIN WITHOUT SCIATICA: Primary | ICD-10-CM

## 2022-09-15 DIAGNOSIS — E78.2 MIXED HYPERCHOLESTEROLEMIA AND HYPERTRIGLYCERIDEMIA: ICD-10-CM

## 2022-09-15 DIAGNOSIS — I10 ESSENTIAL HYPERTENSION: ICD-10-CM

## 2022-09-15 DIAGNOSIS — E11.22 CKD STAGE 3 SECONDARY TO DIABETES (HCC): ICD-10-CM

## 2022-09-15 LAB
BILIRUBIN, POC: NORMAL
BLOOD URINE, POC: NORMAL
CLARITY, POC: NORMAL
COLOR, POC: NORMAL
GLUCOSE URINE, POC: NORMAL
KETONES, POC: NORMAL
LEUKOCYTE EST, POC: NORMAL
NITRITE, POC: NORMAL
PH, POC: 5
PROTEIN, POC: NORMAL
SPECIFIC GRAVITY, POC: 1.02
UROBILINOGEN, POC: 0.2

## 2022-09-15 PROCEDURE — 99214 OFFICE O/P EST MOD 30 MIN: CPT | Performed by: FAMILY MEDICINE

## 2022-09-15 PROCEDURE — 3044F HG A1C LEVEL LT 7.0%: CPT | Performed by: FAMILY MEDICINE

## 2022-09-15 PROCEDURE — 1123F ACP DISCUSS/DSCN MKR DOCD: CPT | Performed by: FAMILY MEDICINE

## 2022-09-15 PROCEDURE — 81002 URINALYSIS NONAUTO W/O SCOPE: CPT | Performed by: FAMILY MEDICINE

## 2022-09-15 NOTE — PROGRESS NOTES
OFFICE PROGRESS NOTE      SUBJECTIVE:        Patient ID:   Kerry Morgan is a 65 y.o. male who presents for   Chief Complaint   Patient presents with    Back Pain     Lower left side, kidney area           HPI:     PAIN IN THE LEFT LOWER BACK FOR ABOUT 1 MONTH. PAIN ONLY WHEN STANDING BUT NO PROBLEM WALKING OR MOVING LAWN. NO URINE OR BOWEL PROBLEM. RECHECK BP, CHOLESTEROL AND DIABETES. MEDICATION REFILL. WATCHING DIET GOOD. WALKING FOR EXERCISE. TAKING MEDICATIONS REGULARLY. Prior to Admission medications    Medication Sig Start Date End Date Taking?  Authorizing Provider   atorvastatin (LIPITOR) 10 MG tablet Take 1 tablet by mouth daily 8/30/22 11/28/22 Yes Gaurang Coyne MD   metFORMIN (GLUCOPHAGE-XR) 500 MG extended release tablet Take 1 tablet by mouth 3 times daily (with meals) 8/30/22  Yes Gaurang Coyne MD   valsartan-hydroCHLOROthiazide (DIOVAN HCT) 160-12.5 MG per tablet Take 1 tablet by mouth daily 8/30/22  Yes Gaurang Coyne MD   tadalafil (CIALIS) 20 MG tablet Take 1 tablet by mouth as needed for Erectile Dysfunction 6/2/22  Yes Gaurang Coyne MD   pantoprazole (PROTONIX) 40 MG tablet Take 1 tablet by mouth every morning (before breakfast) 3/21/22  Yes Gaurang Coyne MD   Cholecalciferol (VITAMIN D3) 50 MCG (2000 UT) CAPS Take by mouth   Yes Historical Provider, MD   Omega-3 Fatty Acids (FISH OIL) 1000 MG CAPS Take 3,000 mg by mouth daily   Yes Historical Provider, MD   vitamin B-1 (THIAMINE) 100 MG tablet Take 100 mg by mouth daily   Yes Historical Provider, MD   ascorbic acid (VITAMIN C) 500 MG tablet Take 1,000 mg by mouth daily    Yes Historical Provider, MD   Multiple Vitamins-Minerals (THERAPEUTIC MULTIVITAMIN-MINERALS) tablet Take 1 tablet by mouth daily   Yes Historical Provider, MD   Misc Natural Products (GLUCOSAMINE CHOND COMPLEX/MSM PO) Take 2 tablets by mouth daily   Yes Historical Provider, MD     Social History     Socioeconomic History    Marital status:    Tobacco Use    Smoking status: Never    Smokeless tobacco: Never   Substance and Sexual Activity    Alcohol use: No    Drug use: No     Social Determinants of Health     Financial Resource Strain: Low Risk     Difficulty of Paying Living Expenses: Not hard at all   Food Insecurity: No Food Insecurity    Worried About Running Out of Food in the Last Year: Never true    Ran Out of Food in the Last Year: Never true       I have reviewed Alexander's allergies, medications, problem list, medical, social and family history and have updated as needed in the electronic medical record  Review Of Systems:    Skin: no abnormal pigmentation, rash, scaling, itching, masses, hair or nail changes  Eyes: no blurring, diplopia, or eye pain  Ears/Nose/Throat: no hearing loss, tinnitus, vertigo, nosebleed, nasal congestion, rhinorrhea, sore throat  Respiratory: no cough, pleuritic chest pain, dyspnea, or wheezing  Cardiovascular: no chest pain, angina, dyspnea on exertion, orthopnea, PND, palpitations, or claudication  Gastrointestinal: no nausea, vomiting, heartburn, diarrhea, constipation, bloating,  abdominal pain, or blood per rectum. Appetite is good  Genitourinary: no urinary urgency, frequency, dysuria, nocturia, hesitancy, or incontinence  Musculoskeletal: joint pains off and on. Morning stiffness.  Ambulating well  Neurologic: no paralysis, paresis, paresthesia, seizures, tremors, or headaches  Hematologic/Lymphatic/Immunologic: no anemia, abnormal bleeding/bruising, fever, chills, night sweats, swollen glands, or unexplained weight loss  Endocrine: no heat or cold intolerance and no polyphagia, polydipsia, or polyuria        OBJECTIVE:     VS:  Wt Readings from Last 3 Encounters:   09/15/22 222 lb (100.7 kg)   08/30/22 222 lb (100.7 kg)   05/25/22 213 lb (96.6 kg)     Temp Readings from Last 3 Encounters:   09/15/22 97 °F (36.1 °C)   08/30/22 97 °F (36.1 °C)   05/25/22 97.6 °F (36.4 °C) BP Readings from Last 3 Encounters:   09/15/22 136/80   08/30/22 136/80   05/25/22 126/70        General appearance: Alert, Awake, Oriented times 3, no distress  Skin: Warm and dry  Head: Normocephalic. No masses, lesions or tenderness noted  Eyes: Conjunctivae appear normal. PERLE  Ears: External ears normal  Nose/Sinuses: Nares normal. Septum midline. Mucosa normal. No drainage  Oropharynx: Oropharynx clear with no exudate seen  Neck: Neck supple. No jugular venous distension, lymphadenopathy or thyromegaly Trachea midline  Chest:  Normal. Movements are Normal and Equal.  Lungs: Lungs clear to auscultation bilaterally. No ronchi, crackles or wheezes  Heart: S1 S2  Regular rate and rhythm. No rub, murmur or gallop  Abdomen: Abdomen soft, non-tender. BS normal. No masses, organomegaly. Back: Grossly Normal. MOVEMENTS ARE LIMITED AND PAINFUL. DTR are Normal. SLR is Normal.  Extremities: Arthritic changes are noted. Movements are limited. Pedal pulses are normal.  Musculoskeletal: Muscular strength appears intact. No joint effusion, tenderness, swelling or warmth  Neuro:  No focal motor or sensory deficits        ASSESSMENT     Patient Active Problem List    Diagnosis Date Noted    Mixed hypercholesterolemia and hypertriglyceridemia 02/08/2021    Hypovitaminosis D 08/30/2022    Primary osteoarthritis of both knees 03/21/2022    CKD stage 3 secondary to diabetes ([facility] [address] 06/24/2021    Type 2 diabetes mellitus without complication, without long-term current use of insulin ([facility] [address] 02/08/2021    Essential hypertension 02/08/2021        Diagnosis:     ICD-10-CM    1. Acute left-sided low back pain without sciatica  M54.50 POCT Urinalysis no Micro     XR LUMBAR SPINE (MIN 4 VIEWS)      2. Type 2 diabetes mellitus without complication, without long-term current use of insulin ([facility])  E11.9     CONTROLLED      3. Mixed hypercholesterolemia and hypertriglyceridemia  E78.2     ? CONTROL      4.  Essential hypertension  I10 CONTROLLED      5. CKD stage 3 secondary to diabetes [facility])  E11.22     N18.30     ? CONTROL          PLAN:           Patient Instructions   LOW SALT FOR BLOOD PRESSURE CONTROL. LOW CARBOHYDRATE FOR BLOOD SUGAR AND WEIGHT CONTROL. LOW FAT DIET FOR CHOLESTEROL CONTROL. DRINK ENOUGH FLUIDS FOR BETTER KIDNEY FUNCTION. TAKE VALSARTAN/-25 DAILY   FOR BLOOD PRESSURE CONTROL. TAKE METFORMIN  MG. 3 TIMES A DAY  FOR BLOOD SUGAR CONTROL. TAKE LIPITOR 10 MG. NIGHTLY   FOR CHOLESTEROL CONTROL. Pina Cerrato TAKE  CIALIS 20 MG. AS NEEDED FOR ERECTION PROBLEM. REGULAR WALKING ADVISED. ADVISED WEIGHT REDUCTION. XR BACK AS RECOMMENDED. USE HEATING PAD 15 MINUTES 2 TIMES A DAY AT THE AFFECTED AREA. USE LOCAL ANALGESIC CREAM 3-4 TIMES A DAY AS NEEDED. FASTING FOR LAB WORK PRIOR TO NEXT VISIT. KEEP NEXT APPOINTMENT IN 6 WEEKS. Return in about 6 weeks (around 10/27/2022) for [address] [place] [address] VISIT. I have reviewed my findings and recommendations with Kimmy Skinner.     Electronically signed by Joey Ponce MD on 9/15/22 at 4:07 PM EDT

## 2022-09-15 NOTE — PATIENT INSTRUCTIONS
LOW SALT FOR BLOOD PRESSURE CONTROL. LOW CARBOHYDRATE FOR BLOOD SUGAR AND WEIGHT CONTROL. LOW FAT DIET FOR CHOLESTEROL CONTROL. DRINK ENOUGH FLUIDS FOR BETTER KIDNEY FUNCTION. TAKE VALSARTAN/-25 DAILY   FOR BLOOD PRESSURE CONTROL. TAKE METFORMIN  MG. 3 TIMES A DAY  FOR BLOOD SUGAR CONTROL. TAKE LIPITOR 10 MG. NIGHTLY   FOR CHOLESTEROL CONTROL. Selina Crumble TAKE  CIALIS 20 MG. AS NEEDED FOR ERECTION PROBLEM. REGULAR WALKING ADVISED. ADVISED WEIGHT REDUCTION. XR BACK AS RECOMMENDED. USE HEATING PAD 15 MINUTES 2 TIMES A DAY AT THE AFFECTED AREA. USE LOCAL ANALGESIC CREAM 3-4 TIMES A DAY AS NEEDED. FASTING FOR LAB WORK PRIOR TO NEXT VISIT. KEEP NEXT APPOINTMENT IN 6 WEEKS.

## 2022-09-28 ENCOUNTER — HOSPITAL ENCOUNTER (OUTPATIENT)
Dept: AUDIOLOGY | Age: 77
Discharge: HOME | End: 2022-09-28
Payer: COMMERCIAL

## 2022-09-28 PROCEDURE — 92567 TYMPANOMETRY: CPT | Performed by: AUDIOLOGIST

## 2022-09-28 PROCEDURE — 92557 COMPREHENSIVE HEARING TEST: CPT | Performed by: AUDIOLOGIST

## 2022-09-28 NOTE — PROGRESS NOTES
with previous test results. Hearing in the left ear is essentially the same as previous testing and some decrease is noted in the right ear. An ENT consult is suggested if you feel it is clinically warranted. A re-evaluation is recommended annually of sooner if needed. New amplification was discussed and is recommended. Discussed trying 2 hearing aids. Patient will call insurance to check if he has hearing aid benefits and where he can use the benefits. Hearing aids will be ordered for a trial pending patient call regarding benefits and receipt of medical clearance from PCP. The above results were reviewed with the patient. If I can be of further assistance or provide additional information, please do not hesitate to contact this office.       Thank you for the referral.        ___________________________________  Electronically signed by Oamri Nicole on 9/29/2022 at 9:20 AM

## 2022-09-30 ENCOUNTER — TELEPHONE (OUTPATIENT)
Dept: AUDIOLOGY | Age: 77
End: 2022-09-30

## 2022-09-30 NOTE — TELEPHONE ENCOUNTER
Patient called and lvm for us to order hearing aids. Put in order. Will call once everything is in.    Electronically signed by Omari Ramires on 9/30/2022 at 3:38 PM

## 2022-10-14 ENCOUNTER — HOSPITAL ENCOUNTER (OUTPATIENT)
Dept: AUDIOLOGY | Age: 77
Discharge: HOME | End: 2022-10-14

## 2022-10-14 PROCEDURE — 9990000010 HC NO CHARGE VISIT: Performed by: AUDIOLOGIST

## 2022-10-14 NOTE — PROGRESS NOTES
Fit with binaural  [facility]  hearing aids. Instructed in use and care. Gave battery charger, warranty information and scheduled  30 day check for 11/23/2022 at 2:00 pm .  Made following adjustments: enabled volume control, adaptation level at 2, connected hearing aids to [facility] and to bluetooth. Hearing aid contract/battery warning form reviewed and signed. Size 10 power domes for left, and size 10 open domes for right ear. Patient was satisfied and will follow up on above date, unless problems arise. No charge visit today. Roberto ORTIZ Audiology Intern.     Electronically signed by Omari Dupree on 10/14/2022 at 4:14 PM

## 2022-11-23 ENCOUNTER — HOSPITAL ENCOUNTER (OUTPATIENT)
Dept: AUDIOLOGY | Age: 77
Discharge: HOME | End: 2022-11-23
Payer: COMMERCIAL

## 2022-11-23 PROCEDURE — V5140 BEHIND EAR BINAUR HEARING AI: HCPCS | Performed by: AUDIOLOGIST

## 2022-11-23 PROCEDURE — V5160 DISPENSING FEE BINAURAL: HCPCS | Performed by: AUDIOLOGIST

## 2022-11-23 NOTE — PROGRESS NOTES
The patient came in for a 30 day hearing aid check. Patient reported they are doing well with the hearing aids. Changes to hearing aids today: None. Gave patient more size 10 open domes per request. Billing done today. Patient to return as needed.     Electronically signed by Omari Avitia on 11/23/2022 at 3:40 PM

## 2022-11-28 DIAGNOSIS — I10 ESSENTIAL HYPERTENSION: ICD-10-CM

## 2022-11-28 DIAGNOSIS — E55.9 HYPOVITAMINOSIS D: ICD-10-CM

## 2022-11-28 LAB
ALBUMIN SERPL-MCNC: 4.3 G/DL (ref 3.5–5.2)
ALP BLD-CCNC: 100 U/L (ref 40–129)
ALT SERPL-CCNC: 20 U/L (ref 0–40)
ANION GAP SERPL CALCULATED.3IONS-SCNC: 16 MMOL/L (ref 7–16)
AST SERPL-CCNC: 24 U/L (ref 0–39)
BILIRUB SERPL-MCNC: 0.5 MG/DL (ref 0–1.2)
BUN BLDV-MCNC: 22 MG/DL (ref 6–23)
CALCIUM SERPL-MCNC: 9.8 MG/DL (ref 8.6–10.2)
CHLORIDE BLD-SCNC: 103 MMOL/L (ref 98–107)
CHOLESTEROL, TOTAL: 152 MG/DL (ref 0–199)
CO2: 24 MMOL/L (ref 22–29)
CREAT SERPL-MCNC: 1.2 MG/DL (ref 0.7–1.2)
GFR SERPL CREATININE-BSD FRML MDRD: >60 ML/MIN/1.73
GLUCOSE BLD-MCNC: 171 MG/DL (ref 74–99)
HDLC SERPL-MCNC: 40 MG/DL
LDL CHOLESTEROL CALCULATED: 76 MG/DL (ref 0–99)
POTASSIUM SERPL-SCNC: 4.5 MMOL/L (ref 3.5–5)
SODIUM BLD-SCNC: 143 MMOL/L (ref 132–146)
TOTAL PROTEIN: 6.5 G/DL (ref 6.4–8.3)
TRIGL SERPL-MCNC: 181 MG/DL (ref 0–149)
VITAMIN D 25-HYDROXY: 34 NG/ML (ref 30–100)
VLDLC SERPL CALC-MCNC: 36 MG/DL

## 2022-11-29 NOTE — RESULT ENCOUNTER NOTE
LDL CHOLESTEROL, TRIGLYCERIDES AND BLOOD SUGAR LEVEL ARE HIGH. RECOMMEND:  LOW SALT, LOW CARB. AND LOW FAT DIET. REGULAR EXERCISE. CONTINUE CURRENT MEDICATIONS. DISCUSS NEXT VISIT. TOMORROW.

## 2022-11-30 ENCOUNTER — OFFICE VISIT (OUTPATIENT)
Dept: FAMILY MEDICINE CLINIC | Age: 77
End: 2022-11-30
Payer: COMMERCIAL

## 2022-11-30 VITALS
HEART RATE: 95 BPM | BODY MASS INDEX: 32.58 KG/M2 | OXYGEN SATURATION: 98 % | WEIGHT: 208 LBS | SYSTOLIC BLOOD PRESSURE: 124 MMHG | DIASTOLIC BLOOD PRESSURE: 80 MMHG

## 2022-11-30 DIAGNOSIS — N18.30 CKD STAGE 3 SECONDARY TO DIABETES (HCC): ICD-10-CM

## 2022-11-30 DIAGNOSIS — E11.22 CKD STAGE 3 SECONDARY TO DIABETES (HCC): ICD-10-CM

## 2022-11-30 DIAGNOSIS — E55.9 HYPOVITAMINOSIS D: ICD-10-CM

## 2022-11-30 DIAGNOSIS — I10 ESSENTIAL HYPERTENSION: ICD-10-CM

## 2022-11-30 DIAGNOSIS — E11.9 TYPE 2 DIABETES MELLITUS WITHOUT COMPLICATION, WITHOUT LONG-TERM CURRENT USE OF INSULIN (HCC): Primary | ICD-10-CM

## 2022-11-30 DIAGNOSIS — E78.2 MIXED HYPERCHOLESTEROLEMIA AND HYPERTRIGLYCERIDEMIA: ICD-10-CM

## 2022-11-30 LAB — HBA1C MFR BLD: 6.9 %

## 2022-11-30 PROCEDURE — 3074F SYST BP LT 130 MM HG: CPT | Performed by: FAMILY MEDICINE

## 2022-11-30 PROCEDURE — 99214 OFFICE O/P EST MOD 30 MIN: CPT | Performed by: FAMILY MEDICINE

## 2022-11-30 PROCEDURE — 3078F DIAST BP <80 MM HG: CPT | Performed by: FAMILY MEDICINE

## 2022-11-30 PROCEDURE — 83036 HEMOGLOBIN GLYCOSYLATED A1C: CPT | Performed by: FAMILY MEDICINE

## 2022-11-30 PROCEDURE — 1123F ACP DISCUSS/DSCN MKR DOCD: CPT | Performed by: FAMILY MEDICINE

## 2022-11-30 PROCEDURE — 3044F HG A1C LEVEL LT 7.0%: CPT | Performed by: FAMILY MEDICINE

## 2022-11-30 NOTE — PATIENT INSTRUCTIONS
LOW SALT FOR BLOOD PRESSURE CONTROL. LOW CARBOHYDRATE FOR BLOOD SUGAR AND WEIGHT CONTROL. LOW FAT DIET FOR CHOLESTEROL CONTROL. DRINK ENOUGH FLUIDS FOR BETTER KIDNEY FUNCTION. TAKE VALSARTAN/-25 DAILY   FOR BLOOD PRESSURE CONTROL. TAKE METFORMIN  MG. 3 TIMES A DAY  FOR BLOOD SUGAR CONTROL. TAKE LIPITOR 10 MG. NIGHTLY   FOR CHOLESTEROL CONTROL. Jestine Courts TAKE  CIALIS 20 MG. AS NEEDED FOR ERECTION PROBLEM. REGULAR WALKING ADVISED. ADVISED WEIGHT REDUCTION. FASTING FOR LAB WORK PRIOR TO NEXT VISIT. NEXT APPOINTMENT IN 3 MONTHS.

## 2022-11-30 NOTE — PROGRESS NOTES
OFFICE PROGRESS NOTE      SUBJECTIVE:        Patient ID:   Lazara Boss is a 65 y.o. male who presents for   Chief Complaint   Patient presents with    Hypertension    Diabetes           HPI:     RECHECK BP, CHOLESTEROL AND DIABETES. MEDICATION REFILL. FEELS GOOD. WATCHING DIET GOOD. WALKING SOME FOR EXERCISE. TAKING MEDICATIONS REGULARLY. Prior to Admission medications    Medication Sig Start Date End Date Taking?  Authorizing Provider   atorvastatin (LIPITOR) 10 MG tablet Take 1 tablet by mouth daily 8/30/22 11/30/22 Yes Fernando Cole MD   metFORMIN (GLUCOPHAGE-XR) 500 MG extended release tablet Take 1 tablet by mouth 3 times daily (with meals) 8/30/22  Yes Fernando Cole MD   valsartan-hydroCHLOROthiazide (DIOVAN HCT) 160-12.5 MG per tablet Take 1 tablet by mouth daily 8/30/22  Yes Fernando Cole MD   tadalafil (CIALIS) 20 MG tablet Take 1 tablet by mouth as needed for Erectile Dysfunction 6/2/22  Yes Fernando Cole MD   pantoprazole (PROTONIX) 40 MG tablet Take 1 tablet by mouth every morning (before breakfast) 3/21/22  Yes Fernando Cole MD   Cholecalciferol (VITAMIN D3) 50 MCG (2000 UT) CAPS Take by mouth   Yes Historical Provider, MD   Omega-3 Fatty Acids (FISH OIL) 1000 MG CAPS Take 3,000 mg by mouth daily   Yes Historical Provider, MD   vitamin B-1 (THIAMINE) 100 MG tablet Take 100 mg by mouth daily   Yes Historical Provider, MD   ascorbic acid (VITAMIN C) 500 MG tablet Take 1,000 mg by mouth daily    Yes Historical Provider, MD   Multiple Vitamins-Minerals (THERAPEUTIC MULTIVITAMIN-MINERALS) tablet Take 1 tablet by mouth daily   Yes Historical Provider, MD   Misc Natural Products (GLUCOSAMINE CHOND COMPLEX/MSM PO) Take 2 tablets by mouth daily   Yes Historical Provider, MD     Social History     Socioeconomic History    Marital status:      Spouse name: None    Number of children: None    Years of education: None    Highest education level: None   Tobacco Use    Smoking status: Never    Smokeless tobacco: Never   Substance and Sexual Activity    Alcohol use: No    Drug use: No     Social Determinants of Health     Financial Resource Strain: Low Risk     Difficulty of Paying Living Expenses: Not hard at all   Food Insecurity: No Food Insecurity    Worried About Running Out of Food in the Last Year: Never true    Ran Out of Food in the Last Year: Never true       I have reviewed Alexander's allergies, medications, problem list, medical, social and family history and have updated as needed in the electronic medical record  Review Of Systems:    Skin: no abnormal pigmentation, rash, scaling, itching, masses, hair or nail changes  Eyes: no blurring, diplopia, or eye pain  Ears/Nose/Throat: no hearing loss, tinnitus, vertigo, nosebleed, nasal congestion, rhinorrhea, sore throat  Respiratory: no cough, pleuritic chest pain, dyspnea, or wheezing  Cardiovascular: no chest pain, angina, dyspnea on exertion, orthopnea, PND, palpitations, or claudication  Gastrointestinal: no nausea, vomiting, heartburn, diarrhea, constipation, bloating,  abdominal pain, or blood per rectum. Appetite is good  Genitourinary: no urinary urgency, frequency, dysuria, nocturia, hesitancy, or incontinence  Musculoskeletal: joint pains off and on. Morning stiffness.  Ambulating well  Neurologic: no paralysis, paresis, paresthesia, seizures, tremors, or headaches  Hematologic/Lymphatic/Immunologic: no anemia, abnormal bleeding/bruising, fever, chills, night sweats, swollen glands, or unexplained weight loss  Endocrine: no heat or cold intolerance and no polyphagia, polydipsia, or polyuria        OBJECTIVE:     VS:  Wt Readings from Last 3 Encounters:   11/30/22 208 lb (94.3 kg)   09/15/22 222 lb (100.7 kg)   08/30/22 222 lb (100.7 kg)     Temp Readings from Last 3 Encounters:   09/15/22 97 °F (36.1 °C)   08/30/22 97 °F (36.1 °C)   05/25/22 97.6 °F (36.4 °C)     BP Readings from Last 3 Encounters:   11/30/22 124/80   09/15/22 136/80   08/30/22 136/80        General appearance: Alert, Awake, Oriented times 3, no distress  Skin: Warm and dry  Head: Normocephalic. No masses, lesions or tenderness noted  Eyes: Conjunctivae appear normal. PERLE  Ears: External ears normal  Nose/Sinuses: Nares normal. Septum midline. Mucosa normal. No drainage  Oropharynx: Oropharynx clear with no exudate seen  Neck: Neck supple. No jugular venous distension, lymphadenopathy or thyromegaly Trachea midline  Chest:  Normal. Movements are Normal and Equal.  Lungs: Lungs clear to auscultation bilaterally. No ronchi, crackles or wheezes  Heart: S1 S2  Regular rate and rhythm. No rub, murmur or gallop  Abdomen: Abdomen soft, non-tender. BS normal. No masses, organomegaly. Back: Grossly Normal and Equal. DTR are Normal. SLR is Normal.  Extremities: Arthritic changes are noted. Movements are limited. Pedal pulses are normal.  Musculoskeletal: Muscular strength appears intact. No joint effusion, tenderness, swelling or warmth  Neuro:  No focal motor or sensory deficits        ASSESSMENT     Patient Active Problem List    Diagnosis Date Noted    Mixed hypercholesterolemia and hypertriglyceridemia 02/08/2021    Hypovitaminosis D 08/30/2022    Primary osteoarthritis of both knees 03/21/2022    CKD stage 3 secondary to diabetes ([facility] [address] 06/24/2021    Type 2 diabetes mellitus without complication, without long-term current use of insulin ([facility] [address] 02/08/2021    Essential hypertension 02/08/2021        Diagnosis:     ICD-10-CM    1. Type 2 diabetes mellitus without complication, without long-term current use of insulin ([facility])  E11.9 POCT glycosylated hemoglobin (Hb A1C)    CONTROLLED      2. Mixed hypercholesterolemia and hypertriglyceridemia  E78.2 Comprehensive Metabolic Panel     Lipid Panel    FAIR CONTORL      3. Essential hypertension  I10     CONTROLLED      4.  CKD stage 3 secondary to diabetes (HCC)  E11.22     N18.30     STABLE      5. Hypovitaminosis D  E55.9     LOW NORMAL. PLAN:           Patient Instructions   LOW SALT FOR BLOOD PRESSURE CONTROL. LOW CARBOHYDRATE FOR BLOOD SUGAR AND WEIGHT CONTROL. LOW FAT DIET FOR CHOLESTEROL CONTROL. DRINK ENOUGH FLUIDS FOR BETTER KIDNEY FUNCTION. TAKE VALSARTAN/-25 DAILY   FOR BLOOD PRESSURE CONTROL. TAKE METFORMIN  MG. 3 TIMES A DAY  FOR BLOOD SUGAR CONTROL. TAKE LIPITOR 10 MG. NIGHTLY   FOR CHOLESTEROL CONTROL. Jameson Ann TAKE  CIALIS 20 MG. AS NEEDED FOR ERECTION PROBLEM. REGULAR WALKING ADVISED. ADVISED WEIGHT REDUCTION. FASTING FOR LAB WORK PRIOR TO NEXT VISIT. NEXT APPOINTMENT IN 3 MONTHS. Return in about 3 months (around 2/28/2023) for FOLLOW UP VISIT. I have reviewed my findings and recommendations with Kelli Rai.     Electronically signed by Melanie Ray MD on 11/30/22 at 10:08 AM EST

## 2022-12-08 RX ORDER — PANTOPRAZOLE SODIUM 40 MG/1
40 TABLET, DELAYED RELEASE ORAL
Qty: 90 TABLET | Refills: 1 | Status: SHIPPED
Start: 2022-12-08 | End: 2022-12-09 | Stop reason: SDUPTHER

## 2022-12-09 RX ORDER — PANTOPRAZOLE SODIUM 40 MG/1
40 TABLET, DELAYED RELEASE ORAL
Qty: 90 TABLET | Refills: 1 | Status: SHIPPED | OUTPATIENT
Start: 2022-12-09

## 2023-02-24 DIAGNOSIS — E78.2 MIXED HYPERCHOLESTEROLEMIA AND HYPERTRIGLYCERIDEMIA: ICD-10-CM

## 2023-02-24 LAB
ALBUMIN SERPL-MCNC: 4.4 G/DL (ref 3.5–5.2)
ALP BLD-CCNC: 93 U/L (ref 40–129)
ALT SERPL-CCNC: 18 U/L (ref 0–40)
ANION GAP SERPL CALCULATED.3IONS-SCNC: 10 MMOL/L (ref 7–16)
AST SERPL-CCNC: 25 U/L (ref 0–39)
BILIRUB SERPL-MCNC: 0.6 MG/DL (ref 0–1.2)
BUN BLDV-MCNC: 20 MG/DL (ref 6–23)
CALCIUM SERPL-MCNC: 9 MG/DL (ref 8.6–10.2)
CHLORIDE BLD-SCNC: 98 MMOL/L (ref 98–107)
CHOLESTEROL, TOTAL: 137 MG/DL (ref 0–199)
CO2: 29 MMOL/L (ref 22–29)
CREAT SERPL-MCNC: 1.2 MG/DL (ref 0.7–1.2)
GFR SERPL CREATININE-BSD FRML MDRD: >60 ML/MIN/1.73
GLUCOSE BLD-MCNC: 138 MG/DL (ref 74–99)
HDLC SERPL-MCNC: 38 MG/DL
LDL CHOLESTEROL CALCULATED: 75 MG/DL (ref 0–99)
POTASSIUM SERPL-SCNC: 4.2 MMOL/L (ref 3.5–5)
SODIUM BLD-SCNC: 137 MMOL/L (ref 132–146)
TOTAL PROTEIN: 6.9 G/DL (ref 6.4–8.3)
TRIGL SERPL-MCNC: 121 MG/DL (ref 0–149)
VLDLC SERPL CALC-MCNC: 24 MG/DL

## 2023-03-01 ENCOUNTER — OFFICE VISIT (OUTPATIENT)
Dept: FAMILY MEDICINE CLINIC | Age: 78
End: 2023-03-01

## 2023-03-01 VITALS
HEART RATE: 80 BPM | BODY MASS INDEX: 32.89 KG/M2 | SYSTOLIC BLOOD PRESSURE: 126 MMHG | DIASTOLIC BLOOD PRESSURE: 70 MMHG | OXYGEN SATURATION: 99 % | WEIGHT: 210 LBS

## 2023-03-01 DIAGNOSIS — E11.9 TYPE 2 DIABETES MELLITUS WITHOUT COMPLICATION, WITHOUT LONG-TERM CURRENT USE OF INSULIN (HCC): Primary | ICD-10-CM

## 2023-03-01 DIAGNOSIS — Z12.5 SCREENING FOR PROSTATE CANCER: ICD-10-CM

## 2023-03-01 DIAGNOSIS — E78.2 MIXED HYPERCHOLESTEROLEMIA AND HYPERTRIGLYCERIDEMIA: ICD-10-CM

## 2023-03-01 DIAGNOSIS — E55.9 HYPOVITAMINOSIS D: ICD-10-CM

## 2023-03-01 DIAGNOSIS — I10 ESSENTIAL HYPERTENSION: ICD-10-CM

## 2023-03-01 DIAGNOSIS — N18.30 CKD STAGE 3 SECONDARY TO DIABETES (HCC): ICD-10-CM

## 2023-03-01 DIAGNOSIS — E11.22 CKD STAGE 3 SECONDARY TO DIABETES (HCC): ICD-10-CM

## 2023-03-01 LAB — HBA1C MFR BLD: 7 %

## 2023-03-01 RX ORDER — ATORVASTATIN CALCIUM 10 MG/1
10 TABLET, FILM COATED ORAL DAILY
Qty: 90 TABLET | Refills: 1 | Status: SHIPPED | OUTPATIENT
Start: 2023-03-01 | End: 2023-05-30

## 2023-03-01 RX ORDER — TADALAFIL 20 MG/1
20 TABLET ORAL PRN
Qty: 40 TABLET | Refills: 1 | Status: SHIPPED | OUTPATIENT
Start: 2023-03-01

## 2023-03-01 RX ORDER — PANTOPRAZOLE SODIUM 40 MG/1
40 TABLET, DELAYED RELEASE ORAL
Qty: 90 TABLET | Refills: 1 | Status: SHIPPED | OUTPATIENT
Start: 2023-03-01

## 2023-03-01 RX ORDER — METFORMIN HYDROCHLORIDE 500 MG/1
500 TABLET, EXTENDED RELEASE ORAL
Qty: 270 TABLET | Refills: 1 | Status: SHIPPED | OUTPATIENT
Start: 2023-03-01

## 2023-03-01 RX ORDER — VALSARTAN AND HYDROCHLOROTHIAZIDE 160; 12.5 MG/1; MG/1
1 TABLET, FILM COATED ORAL DAILY
Qty: 90 TABLET | Refills: 1 | Status: SHIPPED | OUTPATIENT
Start: 2023-03-01

## 2023-03-01 SDOH — ECONOMIC STABILITY: HOUSING INSECURITY
IN THE LAST 12 MONTHS, WAS THERE A TIME WHEN YOU DID NOT HAVE A STEADY PLACE TO SLEEP OR SLEPT IN A SHELTER (INCLUDING NOW)?: NO

## 2023-03-01 SDOH — ECONOMIC STABILITY: FOOD INSECURITY: WITHIN THE PAST 12 MONTHS, YOU WORRIED THAT YOUR FOOD WOULD RUN OUT BEFORE YOU GOT MONEY TO BUY MORE.: NEVER TRUE

## 2023-03-01 SDOH — ECONOMIC STABILITY: INCOME INSECURITY: HOW HARD IS IT FOR YOU TO PAY FOR THE VERY BASICS LIKE FOOD, HOUSING, MEDICAL CARE, AND HEATING?: NOT HARD AT ALL

## 2023-03-01 SDOH — ECONOMIC STABILITY: FOOD INSECURITY: WITHIN THE PAST 12 MONTHS, THE FOOD YOU BOUGHT JUST DIDN'T LAST AND YOU DIDN'T HAVE MONEY TO GET MORE.: NEVER TRUE

## 2023-03-01 ASSESSMENT — PATIENT HEALTH QUESTIONNAIRE - PHQ9
2. FEELING DOWN, DEPRESSED OR HOPELESS: 0
SUM OF ALL RESPONSES TO PHQ9 QUESTIONS 1 & 2: 0
SUM OF ALL RESPONSES TO PHQ QUESTIONS 1-9: 0
1. LITTLE INTEREST OR PLEASURE IN DOING THINGS: 0
SUM OF ALL RESPONSES TO PHQ QUESTIONS 1-9: 0

## 2023-03-01 NOTE — PATIENT INSTRUCTIONS
LOW SALT FOR BLOOD PRESSURE CONTROL. LOW CARBOHYDRATE FOR BLOOD SUGAR AND WEIGHT CONTROL. LOW FAT DIET FOR CHOLESTEROL CONTROL. DRINK ENOUGH FLUIDS FOR BETTER KIDNEY FUNCTION. TAKE VALSARTAN/-25 DAILY   FOR BLOOD PRESSURE CONTROL. TAKE METFORMIN  MG. 3 TIMES A DAY  FOR BLOOD SUGAR CONTROL. TAKE LIPITOR 10 MG. NIGHTLY   FOR CHOLESTEROL CONTROL. Jayna Davidson TAKE  CIALIS 20 MG. AS NEEDED FOR ERECTION PROBLEM. REGULAR WALKING ADVISED. ADVISED WEIGHT REDUCTION. FASTING FOR LAB WORK PRIOR TO NEXT VISIT. NEXT APPOINTMENT IN 4 MONTHS.

## 2023-03-01 NOTE — PROGRESS NOTES
OFFICE PROGRESS NOTE      SUBJECTIVE:        Patient ID:   Francisco J Malagon is a 65 y.o. male who presents for   Chief Complaint   Patient presents with    Hypertension    Diabetes           HPI:     RECHECK BP, CHOLESTEROL AND DIABETES. CIALIS DOES HELP WITH ERECTION PROBLEM. MEDICATION REFILL. FEELS GOOD. WATCHING DIET GOOD. WALKING FOR EXERCISE. TAKING MEDICATIONS REGULARLY. Prior to Admission medications    Medication Sig Start Date End Date Taking?  Authorizing Provider   atorvastatin (LIPITOR) 10 MG tablet Take 1 tablet by mouth daily 3/1/23 5/30/23 Yes John Juan MD   metFORMIN (GLUCOPHAGE-XR) 500 MG extended release tablet Take 1 tablet by mouth 3 times daily (with meals) 3/1/23  Yes John Juan MD   pantoprazole (PROTONIX) 40 MG tablet Take 1 tablet by mouth every morning (before breakfast) 3/1/23  Yes John Juan MD   valsartan-hydroCHLOROthiazide (DIOVAN HCT) 160-12.5 MG per tablet Take 1 tablet by mouth daily 3/1/23  Yes John Juan MD   tadalafil (CIALIS) 20 MG tablet Take 1 tablet by mouth as needed for Erectile Dysfunction 3/1/23  Yes John Juan MD   Cholecalciferol (VITAMIN D3) 50 MCG (2000 UT) CAPS Take by mouth   Yes Historical Provider, MD   vitamin B-1 (THIAMINE) 100 MG tablet Take 100 mg by mouth daily   Yes Historical Provider, MD   ascorbic acid (VITAMIN C) 500 MG tablet Take 1,000 mg by mouth daily    Yes Historical Provider, MD   Omega-3 Fatty Acids (FISH OIL) 1000 MG CAPS Take 3,000 mg by mouth daily    Historical Provider, MD   Multiple Vitamins-Minerals (THERAPEUTIC MULTIVITAMIN-MINERALS) tablet Take 1 tablet by mouth daily    Historical Provider, MD   Misc Natural Products (GLUCOSAMINE CHOND COMPLEX/MSM PO) Take 2 tablets by mouth daily    Historical Provider, MD     Social History     Socioeconomic History    Marital status:      Spouse name: None    Number of children: None    Years of education: None Highest education level: None   Tobacco Use    Smoking status: Never    Smokeless tobacco: Never   Substance and Sexual Activity    Alcohol use: No    Drug use: No     Social Determinants of Health     Financial Resource Strain: Low Risk     Difficulty of Paying Living Expenses: Not hard at all   Food Insecurity: No Food Insecurity    Worried About Running Out of Food in the Last Year: Never true    Ran Out of Food in the Last Year: Never true   Transportation Needs: Unknown    Lack of Transportation (Non-Medical): No   Housing Stability: Unknown    Unstable Housing in the Last Year: No       I have reviewed Alexander's allergies, medications, problem list, medical, social and family history and have updated as needed in the electronic medical record  Review Of Systems:    Skin: no abnormal pigmentation, rash, scaling, itching, masses, hair or nail changes  Eyes: no blurring, diplopia, or eye pain  Ears/Nose/Throat: no hearing loss, tinnitus, vertigo, nosebleed, nasal congestion, rhinorrhea, sore throat  Respiratory: no cough, pleuritic chest pain, dyspnea, or wheezing  Cardiovascular: no chest pain, angina, dyspnea on exertion, orthopnea, PND, palpitations, or claudication  Gastrointestinal: no nausea, vomiting, heartburn, diarrhea, constipation, bloating,  abdominal pain, or blood per rectum. Appetite is good  Genitourinary: no urinary urgency, frequency, dysuria, nocturia, hesitancy, or incontinence  Musculoskeletal: joint pains off and on. Morning stiffness.  Ambulating well  Neurologic: no paralysis, paresis, paresthesia, seizures, tremors, or headaches  Hematologic/Lymphatic/Immunologic: no anemia, abnormal bleeding/bruising, fever, chills, night sweats, swollen glands, or unexplained weight loss  Endocrine: no heat or cold intolerance and no polyphagia, polydipsia, or polyuria        OBJECTIVE:     VS:  Wt Readings from Last 3 Encounters:   03/01/23 210 lb (95.3 kg)   11/30/22 208 lb (94.3 kg)   09/15/22 222 lb (100.7 kg)     Temp Readings from Last 3 Encounters:   09/15/22 97 °F (36.1 °C)   08/30/22 97 °F (36.1 °C)   05/25/22 97.6 °F (36.4 °C)     BP Readings from Last 3 Encounters:   03/01/23 126/70   11/30/22 124/80   09/15/22 136/80        General appearance: Alert, Awake, Oriented times 3, no distress  Skin: Warm and dry  Head: Normocephalic. No masses, lesions or tenderness noted  Eyes: Conjunctivae appear normal. PERLE  Ears: External ears normal  Nose/Sinuses: Nares normal. Septum midline. Mucosa normal. No drainage  Oropharynx: Oropharynx clear with no exudate seen  Neck: Neck supple. No jugular venous distension, lymphadenopathy or thyromegaly Trachea midline  Chest:  Normal. Movements are Normal and Equal.  Lungs: Lungs clear to auscultation bilaterally. No ronchi, crackles or wheezes  Heart: S1 S2  Regular rate and rhythm. No rub, murmur or gallop  Abdomen: Abdomen soft, non-tender. BS normal. No masses, organomegaly. Back: Grossly Normal and Equal. DTR are Normal. SLR is Normal.  Extremities: Arthritic changes are noted. Movements are limited. Pedal pulses are normal.  Musculoskeletal: Muscular strength appears intact. No joint effusion, tenderness, swelling or warmth  Neuro:  No focal motor or sensory deficits        ASSESSMENT     Patient Active Problem List    Diagnosis Date Noted    Mixed hypercholesterolemia and hypertriglyceridemia 02/08/2021    Hypovitaminosis D 08/30/2022    Primary osteoarthritis of both knees 03/21/2022    CKD stage 3 secondary to diabetes ([facility] 06/24/2021    Type 2 diabetes mellitus without complication, without long-term current use of insulin ([facility] 02/08/2021    Essential hypertension 02/08/2021        Diagnosis:     ICD-10-CM    1. Type 2 diabetes mellitus without complication, without long-term current use of insulin ([facility])  E11.9 POCT glycosylated hemoglobin (Hb A1C)    FAIR CONTROL      2.  Mixed hypercholesterolemia and hypertriglyceridemia  E78.2 Comprehensive Metabolic Panel     Lipid Panel    CONTROLLED      3. Essential hypertension  I10     CONTROLLED      4. CKD stage 3 secondary to diabetes ([facility])  E11.22     N18.30     IMPROVED      5. Hypovitaminosis D  E55.9 Vitamin D 25 Hydroxy    LOW NORMAL. 6. Screening for prostate cancer  Z12.5 PSA Screening          PLAN:     A1C OF 7.0 TODAY DISCUSSED AND ADVISED. LABORATORY RESULTS 2/24/2023 REVIEWED AND DISCUSSED. Patient Instructions   LOW SALT FOR BLOOD PRESSURE CONTROL. LOW CARBOHYDRATE FOR BLOOD SUGAR AND WEIGHT CONTROL. LOW FAT DIET FOR CHOLESTEROL CONTROL. DRINK ENOUGH FLUIDS FOR BETTER KIDNEY FUNCTION. TAKE VALSARTAN/-25 DAILY   FOR BLOOD PRESSURE CONTROL. TAKE METFORMIN  MG. 3 TIMES A DAY  FOR BLOOD SUGAR CONTROL. TAKE LIPITOR 10 MG. NIGHTLY   FOR CHOLESTEROL CONTROL. Logan Colmenares TAKE  CIALIS 20 MG. AS NEEDED FOR ERECTION PROBLEM. REGULAR WALKING ADVISED. ADVISED WEIGHT REDUCTION. FASTING FOR LAB WORK PRIOR TO NEXT VISIT. NEXT APPOINTMENT IN 4 MONTHS. Return in about 4 months (around 7/1/2023) for FOLLOW UP VISIT. I have reviewed my findings and recommendations with Zulay Giron.     Electronically signed by Drew Sanchez MD on 3/1/23 at 9:50 AM EST

## 2023-03-04 ENCOUNTER — APPOINTMENT (OUTPATIENT)
Dept: GENERAL RADIOLOGY | Age: 78
End: 2023-03-04
Payer: COMMERCIAL

## 2023-03-04 ENCOUNTER — APPOINTMENT (OUTPATIENT)
Dept: CT IMAGING | Age: 78
End: 2023-03-04
Payer: COMMERCIAL

## 2023-03-04 ENCOUNTER — HOSPITAL ENCOUNTER (EMERGENCY)
Age: 78
Discharge: HOME | End: 2023-03-04
Attending: EMERGENCY MEDICINE
Payer: COMMERCIAL

## 2023-03-04 VITALS
SYSTOLIC BLOOD PRESSURE: 129 MMHG | TEMPERATURE: 97.8 F | HEART RATE: 97 BPM | OXYGEN SATURATION: 100 % | RESPIRATION RATE: 20 BRPM | DIASTOLIC BLOOD PRESSURE: 59 MMHG

## 2023-03-04 DIAGNOSIS — R55 NEAR SYNCOPE: ICD-10-CM

## 2023-03-04 DIAGNOSIS — K86.2 PANCREATIC CYST: ICD-10-CM

## 2023-03-04 DIAGNOSIS — R11.2 NAUSEA AND VOMITING, UNSPECIFIED VOMITING TYPE: Primary | ICD-10-CM
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
## 2023-03-04 LAB
ALBUMIN SERPL-MCNC: 4.6 G/DL (ref 3.5–5.2)
ALP BLD-CCNC: 112 U/L (ref 40–129)
ALT SERPL-CCNC: 19 U/L (ref 0–40)
ANION GAP SERPL CALCULATED.3IONS-SCNC: 12 MMOL/L (ref 7–16)
AST SERPL-CCNC: 25 U/L (ref 0–39)
BASOPHILS ABSOLUTE: 0 E9/L (ref 0–0.2)
BASOPHILS RELATIVE PERCENT: 0.2 % (ref 0–2)
BILIRUB SERPL-MCNC: 0.9 MG/DL (ref 0–1.2)
BUN BLDV-MCNC: 18 MG/DL (ref 6–23)
CALCIUM SERPL-MCNC: 9.7 MG/DL (ref 8.6–10.2)
CHLORIDE BLD-SCNC: 101 MMOL/L (ref 98–107)
CO2: 29 MMOL/L (ref 22–29)
CREAT SERPL-MCNC: 1.3 MG/DL (ref 0.7–1.2)
EOSINOPHILS ABSOLUTE: 0 E9/L (ref 0.05–0.5)
EOSINOPHILS RELATIVE PERCENT: 0.6 % (ref 0–6)
GFR SERPL CREATININE-BSD FRML MDRD: 56 ML/MIN/1.73
GLUCOSE BLD-MCNC: 164 MG/DL (ref 74–99)
HCT VFR BLD CALC: 46.2 % (ref 37–54)
HEMOGLOBIN: 15.3 G/DL (ref 12.5–16.5)
INFLUENZA A BY PCR: NOT DETECTED
INFLUENZA B BY PCR: NOT DETECTED
LYMPHOCYTES ABSOLUTE: 0.39 E9/L (ref 1.5–4)
LYMPHOCYTES RELATIVE PERCENT: 2.6 % (ref 20–42)
MCH RBC QN AUTO: 31.1 PG (ref 26–35)
MCHC RBC AUTO-ENTMCNC: 33.1 % (ref 32–34.5)
MCV RBC AUTO: 93.9 FL (ref 80–99.9)
MONOCYTES ABSOLUTE: 0.79 E9/L (ref 0.1–0.95)
MONOCYTES RELATIVE PERCENT: 6 % (ref 2–12)
NEUTROPHILS ABSOLUTE: 11.92 E9/L (ref 1.8–7.3)
NEUTROPHILS RELATIVE PERCENT: 91.4 % (ref 43–80)
PDW BLD-RTO: 12.6 FL (ref 11.5–15)
PLATELET # BLD: 180 E9/L (ref 130–450)
PMV BLD AUTO: 12.8 FL (ref 7–12)
POTASSIUM REFLEX MAGNESIUM: 4.1 MMOL/L (ref 3.5–5)
PRO-BNP: 213 PG/ML (ref 0–450)
RBC # BLD: 4.92 E12/L (ref 3.8–5.8)
SARS-COV-2, NAAT: NOT DETECTED
SODIUM BLD-SCNC: 142 MMOL/L (ref 132–146)
TOTAL PROTEIN: 7.4 G/DL (ref 6.4–8.3)
TROPONIN, HIGH SENSITIVITY: 19 NG/L (ref 0–11)
TROPONIN, HIGH SENSITIVITY: 19 NG/L (ref 0–11)
WBC # BLD: 13.1 E9/L (ref 4.5–11.5)

## 2023-03-04 PROCEDURE — 87502 INFLUENZA DNA AMP PROBE: CPT

## 2023-03-04 PROCEDURE — 99285 EMERGENCY DEPT VISIT HI MDM: CPT

## 2023-03-04 PROCEDURE — 84484 ASSAY OF TROPONIN QUANT: CPT

## 2023-03-04 PROCEDURE — 83880 ASSAY OF NATRIURETIC PEPTIDE: CPT

## 2023-03-04 PROCEDURE — 80053 COMPREHEN METABOLIC PANEL: CPT

## 2023-03-04 PROCEDURE — 74177 CT ABD & PELVIS W/CONTRAST: CPT

## 2023-03-04 PROCEDURE — 6360000002 HC RX W HCPCS: Performed by: STUDENT IN AN ORGANIZED HEALTH CARE EDUCATION/TRAINING PROGRAM

## 2023-03-04 PROCEDURE — 72125 CT NECK SPINE W/O DYE: CPT

## 2023-03-04 PROCEDURE — 71045 X-RAY EXAM CHEST 1 VIEW: CPT

## 2023-03-04 PROCEDURE — 87635 SARS-COV-2 COVID-19 AMP PRB: CPT

## 2023-03-04 PROCEDURE — 6360000004 HC RX CONTRAST MEDICATION: Performed by: RADIOLOGY

## 2023-03-04 PROCEDURE — 93005 ELECTROCARDIOGRAM TRACING: CPT | Performed by: STUDENT IN AN ORGANIZED HEALTH CARE EDUCATION/TRAINING PROGRAM

## 2023-03-04 PROCEDURE — 2580000003 HC RX 258: Performed by: STUDENT IN AN ORGANIZED HEALTH CARE EDUCATION/TRAINING PROGRAM

## 2023-03-04 PROCEDURE — 85025 COMPLETE CBC W/AUTO DIFF WBC: CPT

## 2023-03-04 PROCEDURE — 96374 THER/PROPH/DIAG INJ IV PUSH: CPT

## 2023-03-04 PROCEDURE — 70450 CT HEAD/BRAIN W/O DYE: CPT

## 2023-03-04 RX ORDER — ONDANSETRON 4 MG/1
4 TABLET, ORALLY DISINTEGRATING ORAL 3 TIMES DAILY PRN
Qty: 21 TABLET | Refills: 0 | Status: SHIPPED | OUTPATIENT
Start: 2023-03-04

## 2023-03-04 RX ORDER — ONDANSETRON 2 MG/ML
4 INJECTION INTRAMUSCULAR; INTRAVENOUS ONCE
Status: COMPLETED | OUTPATIENT
Start: 2023-03-04 | End: 2023-03-04

## 2023-03-04 RX ORDER — 0.9 % SODIUM CHLORIDE 0.9 %
1000 INTRAVENOUS SOLUTION INTRAVENOUS ONCE
Status: COMPLETED | OUTPATIENT
Start: 2023-03-04 | End: 2023-03-04

## 2023-03-04 RX ORDER — PROMETHAZINE HYDROCHLORIDE 25 MG/ML
12.5 INJECTION, SOLUTION INTRAMUSCULAR; INTRAVENOUS ONCE
Status: DISCONTINUED | OUTPATIENT
Start: 2023-03-04 | End: 2023-03-05 | Stop reason: HOSPADM

## 2023-03-04 RX ADMIN — IOPAMIDOL 75 ML: 755 INJECTION, SOLUTION INTRAVENOUS at 21:14

## 2023-03-04 RX ADMIN — ONDANSETRON 4 MG: 2 INJECTION INTRAMUSCULAR; INTRAVENOUS at 19:52

## 2023-03-04 RX ADMIN — SODIUM CHLORIDE 1000 ML: 9 INJECTION, SOLUTION INTRAVENOUS at 19:50

## 2023-03-05 LAB
EKG ATRIAL RATE: 111 BPM
EKG P AXIS: 17 DEGREES
EKG P-R INTERVAL: 158 MS
EKG Q-T INTERVAL: 328 MS
EKG QRS DURATION: 74 MS
EKG QTC CALCULATION (BAZETT): 446 MS
EKG R AXIS: -4 DEGREES
EKG T AXIS: -8 DEGREES
EKG VENTRICULAR RATE: 111 BPM

## 2023-03-05 PROCEDURE — 93010 ELECTROCARDIOGRAM REPORT: CPT | Performed by: INTERNAL MEDICINE

## 2023-03-05 NOTE — ED PROVIDER NOTES
[address] ENCOUNTER      Pt Name: Catie Jules  MRN: [id number]  [place] 1945  Date of evaluation: 3/4/2023  Provider: Elvis Blas DO  PCP: Elieser Carlson MD  Note Started: 11:14 PM EST 3/4/23    CHIEF COMPLAINT       Chief Complaint   Patient presents with    Fall     Twice today and legs gave out, hit head - thinners -loc    Dizziness    Emesis       HISTORY OF PRESENT ILLNESS: 1 or more Elements   History From: Patient  Limitations to history : None    Catie Jules is a 65 y.o. male Past medical history of type 2 diabetes, hypertension and hyperlipidemia. Patient presents with chief complaint of nausea, vomiting as well as dizziness. Patient states that shortly after lunch today he had sudden onset of nausea with multiple episodes of nonbilious nonbloody vomiting. Patient states that during these episodes his legs became weak and gave out from underneath him. Patient denies striking his head he denies any loss of consciousness. Patient states he continues to have episodes of nausea with nonbilious nonbloody vomiting. Patient states his symptoms have been moderate in severity, since onset. He denies any exacerbating relieving factors. Patient denies any similar episodes in the past.  Patient denies any fevers, chills, chest pain, cough, abdominal pain, constipation or diarrhea. Nursing Notes were all reviewed and agreed with or any disagreements were addressed in the HPI. REVIEW OF SYSTEMS :    Positives and Pertinent negatives as per HPI.      SURGICAL HISTORY     Past Surgical History:   Procedure Laterality Date    CARPAL TUNNEL RELEASE  2004 2005    EYE SURGERY      bilat cataract extracftions    HEMORRHOID SURGERY      TOENAIL EXCISION         CURRENTMEDICATIONS       Previous Medications    ASCORBIC ACID (VITAMIN C) 500 MG TABLET    Take 1,000 mg by mouth daily     ATORVASTATIN (LIPITOR) 10 MG TABLET    Take 1 tablet by mouth daily    CHOLECALCIFEROL (VITAMIN D3) 50 MCG (2000 UT) CAPS    Take by mouth    METFORMIN (GLUCOPHAGE-XR) 500 MG EXTENDED RELEASE TABLET    Take 1 tablet by mouth 3 times daily (with meals)    MISC NATURAL PRODUCTS (GLUCOSAMINE CHOND COMPLEX/MSM PO)    Take 2 tablets by mouth daily    MULTIPLE VITAMINS-MINERALS (THERAPEUTIC MULTIVITAMIN-MINERALS) TABLET    Take 1 tablet by mouth daily    OMEGA-3 FATTY ACIDS (FISH OIL) 1000 MG CAPS    Take 3,000 mg by mouth daily    PANTOPRAZOLE (PROTONIX) 40 MG TABLET    Take 1 tablet by mouth every morning (before breakfast)    TADALAFIL (CIALIS) 20 MG TABLET    Take 1 tablet by mouth as needed for Erectile Dysfunction    VALSARTAN-HYDROCHLOROTHIAZIDE (DIOVAN HCT) 160-12.5 MG PER TABLET    Take 1 tablet by mouth daily    VITAMIN B-1 (THIAMINE) 100 MG TABLET    Take 100 mg by mouth daily       ALLERGIES     Prednisone    FAMILYHISTORY       Family History   Problem Relation Age of Onset    Coronary Art Dis Mother     Cancer Mother         UTERINE    Diabetes Mother     Other Father 72        AAA        SOCIAL HISTORY       Social History     Tobacco Use    Smoking status: Never    Smokeless tobacco: Never   Substance Use Topics    Alcohol use: No    Drug use: No       SCREENINGS        Lo Coma Scale  Eye Opening: Spontaneous  Best Verbal Response: Oriented  Best Motor Response: Obeys commands  Celestine Coma Scale Score: 15                CIWA Assessment  BP: (!) 129/59  Heart Rate: 97           PHYSICAL EXAM  1 or more Elements     ED Triage Vitals   BP Temp Temp Source Heart Rate Resp SpO2 Height Weight   03/04/23 1802 03/04/23 1804 03/04/23 1804 03/04/23 1756 03/04/23 1756 03/04/23 1756 -- --   (!) 129/59 97.8 °F (36.6 °C) Oral 97 20 100 %           Constitutional/General: Alert and oriented x3  Head: Normocephalic and atraumatic  Eyes: PERRL, EOMI, conjunctiva normal, sclera non icteric  ENT:  Oropharynx clear, handling secretions, no trismus, no asymmetry of the posterior oropharynx or uvular edema  Neck: Supple, full ROM, no stridor, no meningeal signs  Respiratory: Lungs clear to auscultation bilaterally, no wheezes, rales, or rhonchi. Not in respiratory distress  Cardiovascular:  Regular rate. Regular rhythm. No murmurs, no gallops, no rubs. 2+ distal pulses. Equal extremity pulses.   Chest: No chest wall tenderness  GI:  Abdomen Soft, Non tender, Non distended.  +BS.   No rebound, guarding, or rigidity. No pulsatile masses.  Musculoskeletal: Moves all extremities x 4. Warm and well perfused, no clubbing, no cyanosis, no edema. Capillary refill <3 seconds  Integument: skin warm and dry. No rashes.   Neurologic: GCS 15, no focal deficits, symmetric strength 5/5 in the upper and lower extremities bilaterally.  Patient up walking no assistance no ataxia noted  Psychiatric: Normal Affect    DIAGNOSTIC RESULTS   LABS:    Labs Reviewed   CBC WITH AUTO DIFFERENTIAL - Abnormal; Notable for the following components:       Result Value    WBC 13.1 (*)     MPV 12.8 (*)     Neutrophils % 91.4 (*)     Lymphocytes % 2.6 (*)     Neutrophils Absolute 11.92 (*)     Lymphocytes Absolute 0.39 (*)     Eosinophils Absolute 0.00 (*)     All other components within normal limits   COMPREHENSIVE METABOLIC PANEL W/ REFLEX TO MG FOR LOW K - Abnormal; Notable for the following components:    Glucose 164 (*)     Creatinine 1.3 (*)     All other components within normal limits   TROPONIN - Abnormal; Notable for the following components:    Troponin, High Sensitivity 19 (*)     All other components within normal limits   TROPONIN - Abnormal; Notable for the following components:    Troponin, High Sensitivity 19 (*)     All other components within normal limits   COVID-19, RAPID   RAPID INFLUENZA A/B ANTIGENS   BRAIN NATRIURETIC PEPTIDE   URINALYSIS WITH MICROSCOPIC       As interpreted by me, the above displayed labs are abnormal. All other labs obtained during this visit were within normal range  or not returned as of this dictation. EKG Interpretation:  Interpreted by emergency department physician, Tone Billy DO  Rate: 111  Rhythm: Sinus  Interpretation: EKG obtained demonstrates sinus tachycardia, rate 111, normal axis, QTc 446, no acute ST segment changes. Comparison: stable as compared to patient's most recent EKG    RADIOLOGY:   Non-plain film images such as CT, Ultrasound and MRI are read by the radiologist. Plain radiographic images are visualized and preliminarily interpreted by the ED Provider with the below findings:    CXR: No acute abnormalities    Interpretation per the Radiologist below, if available at the time of this note:    CT ABDOMEN PELVIS W IV CONTRAST Additional Contrast? None   Final Result   1. There are multiple fluid-filled loops of small bowel present concerning   for an enteritis or ileus   2. There is a 9 mm pancreatic cyst and follow-up sonography or CT scan in 6   months is advised for further characterization   3. Pericardial effusion   4. Slight dilation of the intrahepatic bile ducts in segment 6 anteriorly and   correlation with liver enzymes may be helpful to determine the significance   of this finding. 5. Small fixed hiatus hernia   6. Advanced sigmoid diverticulosis but no signs of diverticulitis         XR CHEST 1 VIEW   Final Result   No acute cardiopulmonary process. CT HEAD WO CONTRAST   Final Result   No acute intracranial abnormality. CT CERVICAL SPINE WO CONTRAST   Final Result   No acute abnormality of the cervical spine. CT HEAD WO CONTRAST    Result Date: 3/4/2023  EXAMINATION: CT OF THE HEAD WITHOUT CONTRAST  3/4/2023 6:35 pm TECHNIQUE: CT of the head was performed without the administration of intravenous contrast. Automated exposure control, iterative reconstruction, and/or weight based adjustment of the mA/kV was utilized to reduce the radiation dose to as low as reasonably achievable. COMPARISON: None.  HISTORY: ORDERING SYSTEM PROVIDED HISTORY: fall TECHNOLOGIST PROVIDED HISTORY: Reason for exam:->fall Has a \"code stroke\" or \"stroke alert\" been called? ->No Decision Support Exception - unselect if not a suspected or confirmed emergency medical condition->Emergency Medical Condition (MA) FINDINGS: BRAIN/VENTRICLES: There is no acute intracranial hemorrhage, mass effect or midline shift. No abnormal extra-axial fluid collection. The gray-white differentiation is maintained without evidence of an acute infarct. There is no evidence of hydrocephalus. ORBITS: The visualized portion of the orbits demonstrate no acute abnormality. SINUSES: The visualized paranasal sinuses and mastoid air cells demonstrate no acute abnormality. SOFT TISSUES/SKULL:  No acute abnormality of the visualized skull or soft tissues. No acute intracranial abnormality. CT CERVICAL SPINE WO CONTRAST    Result Date: 3/4/2023  EXAMINATION: CT OF THE CERVICAL SPINE WITHOUT CONTRAST 3/4/2023 6:35 pm TECHNIQUE: CT of the cervical spine was performed without the administration of intravenous contrast. Multiplanar reformatted images are provided for review. Automated exposure control, iterative reconstruction, and/or weight based adjustment of the mA/kV was utilized to reduce the radiation dose to as low as reasonably achievable. COMPARISON: None. HISTORY: ORDERING SYSTEM PROVIDED HISTORY: neck pain, r/o fx TECHNOLOGIST PROVIDED HISTORY: Reason for exam:->neck pain, r/o fx FINDINGS: BONES/ALIGNMENT: There is no acute fracture or traumatic malalignment. DEGENERATIVE CHANGES: There are multilevel discogenic changes most pronounced at C5-6 and C6-7 levels without evidence of acute fracture or subluxation. SOFT TISSUES: There is no prevertebral soft tissue swelling. No acute abnormality of the cervical spine.      CT ABDOMEN PELVIS W IV CONTRAST Additional Contrast? None    Result Date: 3/4/2023  EXAMINATION: CT OF THE ABDOMEN AND PELVIS WITH CONTRAST 3/4/2023 8:57 pm TECHNIQUE: CT of the abdomen and pelvis was performed with the administration of intravenous contrast. Multiplanar reformatted images are provided for review. Automated exposure control, iterative reconstruction, and/or weight based adjustment of the mA/kV was utilized to reduce the radiation dose to as low as reasonably achievable. COMPARISON: None. HISTORY: ORDERING SYSTEM PROVIDED HISTORY: nausea, vomiting TECHNOLOGIST PROVIDED HISTORY: Reason for exam:->nausea, vomiting Additional Contrast?->None Decision Support Exception - unselect if not a suspected or confirmed emergency medical condition->Emergency Medical Condition (MA) FINDINGS: Lower Chest: No significant parenchymal abnormalities are noted at the lung bases. No consolidation or pneumothorax is observed. The heart size is felt to be at the upper limits of normal.  There is a small pericardial effusion present. Organs: The liver reveals a homogeneous density. There are signs of duct dilation in the inferior aspect of segment 6. This does not however extend centrally and correlation with liver enzymes may be helpful to determine the significance of this finding. No discrete masses were observed. The gallbladder reveals no signs of stones or wall thickening. The region of the pancreas appears within the normal range. There is however a small pancreatic cysts measuring 9 mm on axial image (2:48) follow-up evaluation is recommended. Spleen adrenal glands and kidneys revealed no acute abnormalities. No stones or hydronephrosis were observed GI/Bowel: There is a small fixed hiatus hernia. The stomach appears unremarkable otherwise. No outlet obstruction is seen. There are multiple fluid-filled loops of small bowel present concerning for an ileus or enteritis. The terminal ileum appears unremarkable. The appendix is not visualized and may be surgically absent.   The colon demonstrates multiple scattered diverticula, more so in the sigmoid region but there are no signs of active diverticulitis. Pelvis: The urinary bladder reveals a normal rounded contour. The prostate gland is prominent size measuring 6.5 cm suggest prostatomegaly. There is a potential lymph node adjacent to the right seminal vesicle measuring 18 mm on axial image (2:145) correlation with a PET scan may be helpful in better characterization if clinically indicated. Peritoneum/Retroperitoneum: There are no signs of retroperitoneal lymphadenopathy aneurysm or hemorrhage present. Bones/Soft Tissues: Images of the lumbosacral spine reveals a 9 mm anterolisthesis of L5 on S1 there is a 4 mm anterolisthesis of L4 on L5. There are signs of degenerative disc disease with disc height loss and vacuum phenomena at the L3-L4 level. 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 Significant facet arthropathy is noted at the L3-L4, L4-L5 and L5-S1 levels. 1. There are multiple fluid-filled loops of small bowel present concerning for an enteritis or ileus 2. There is a 9 mm pancreatic cyst and follow-up sonography or CT scan in 6 months is advised for further characterization 3. Pericardial effusion 4. Slight dilation of the intrahepatic bile ducts in segment 6 anteriorly and correlation with liver enzymes may be helpful to determine the significance of this finding. 5. Small fixed hiatus hernia 6. Advanced sigmoid diverticulosis but no signs of diverticulitis     XR CHEST 1 VIEW    Result Date: 3/4/2023  EXAMINATION: ONE XRAY VIEW OF THE CHEST 3/4/2023 6:41 pm COMPARISON: None. HISTORY: ORDERING SYSTEM PROVIDED HISTORY: fall TECHNOLOGIST PROVIDED HISTORY: Reason for exam:->fall FINDINGS: Single AP upright portable chest demonstrates tortuosity of the aorta without cardiomegaly. The lungs are clear and there is no evidence of a pneumothorax. No acute cardiopulmonary process. No results found.     PROCEDURES   Unless otherwise noted below, none       PAST MEDICAL HISTORY/Chronic Conditions Affecting Care    has a past medical history of Diabetes mellitus ([facility] [address] Erectile dysfunction, GERD (gastroesophageal reflux disease), Hyperlipidemia, Hypertension, and Type 2 diabetes mellitus without complication ([facility] [address] EMERGENCY DEPARTMENT COURSE    Vitals:    Vitals:    03/04/23 1756 03/04/23 1802 03/04/23 1804   BP:  (!) 129/59    Pulse: 97     Resp: 20     Temp:   97.8 °F (36.6 °C)   TempSrc:   Oral   SpO2: 100%         Patient was given the following medications:  Medications   promethazine (PHENERGAN) injection 12.5 mg (has no administration in time range)   0.9 % sodium chloride bolus (0 mLs IntraVENous Stopped 3/4/23 2012)   ondansetron (ZOFRAN) injection 4 mg (4 mg IntraVENous Given 3/4/23 1952)   iopamidol (ISOVUE-370) 76 % injection 75 mL (75 mLs IntraVENous Given 3/4/23 2114)         Is this patient to be included in the SEP-1 Core Measure due to severe sepsis or septic shock? No Exclusion criteria - the patient is NOT to be included for SEP-1 Core Measure due to: 2+ SIRS criteria are not met      Medical Decision Making/Differential Diagnosis:    CC/HPI Summary, Social Determinants of health, Records Reviewed, DDx, testing done/not done, ED Course, Reassessment, disposition considerations/shared decision making with patient, consults, disposition:            Chronic Conditions:   Past Medical History:   Diagnosis Date    Di[facility]     Erectile dysfunction     GERD (gastroesophageal reflux disease)     Hyperlipidemia     Hypertension     Type 2 diabetes mellitus without complication ([facility]        CONSULTS: (Who and What was discussed)  None    Discussion with Other Profesionals : None    Social Determinants : None    Records Reviewed : None    CC/HPI Summary, DDx, ED Course, and Reassessment:   Patient is a 79-year-old male past medical history of type 2 diabetes, hypertension hyperlipidemia.   Patient presents with complaint of nausea, vomiting as well as dizziness. Vital signs stable presentation except for mild tachycardia. On physical exam heart mildly tachycardic regular rhythm, lungs clear to auscultation bilaterally, abdomen soft nontender no rebound or guarding. No focal deficits on neuro exam.  Differential diagnosis includes syncope, gastritis, gastroenteritis, diverticulitis. EKG obtained demonstrated no acute ischemic changes. Laboratory work obtained CBC demonstrated minimal leukocytosis of 13.1, CMP demonstrated chronically elevated creatinine 1.3, flu negative, COVID-negative, proBNP 213, troponin 19 on repeat 19. Due to fall and syncopal episode CT scan of the head and neck were obtained demonstrate no acute abnormalities, chest x-ray obtained demonstrated no acute abnormalities. Patient continues to have episodes of nausea and vomiting in ER despite 1 L of IV fluids as well as 4 mg of IV Zofran. CT scan of the abdomen pelvis was obtained, CT scan demonstrated findings concerning for enteritis as well as small pancreatic cyst.  Patient was made aware of abnormal CT findings. On repeat evaluation patient has no significant treatment symptoms he is up in pain Emergency Department he is in no acute distress. Plans consistent with enteritis. Patient able to tolerate oral intake. Overall patient is well-appearing he does note significant proved in symptoms. He is appropriate for discharge at this time. Patient instructed increase fluids and to Zofran as needed. In addition if patient notes any new worrisome symptoms he was instructed to return to emergency department for further evaluation. Plan of care discussed with patient including discharge, all questions were answered, patient was agreement plan of care and discharged home in stable condition.     Disposition Considerations (Tests not ordered but considered, Shared Decision Making, Pt Expectation of Test or Tx.): Shared decision making discussed with patient as well as wife at the bedside. Repeat evaluation patient notes significant proved in symptoms she is up ambulating the emergency department no acute distress. Wife at the bedside also notes that she had similar symptoms after eating lunch today. FINAL IMPRESSION      1. Nausea and vomiting, unspecified vomiting type    2. Near syncope    3. Fall, initial encounter    4.  Pancreatic cyst          DISPOSITION/PLAN     DISPOSITION Decision To Discharge 03/04/2023 11:00:41 PM    PATIENT REFERRED TO:  HUSSAIN Gilliland [address] [place] [address] [id number]    Schedule an appointment as soon as possible for a visit       [address] [facility] Emergency Department  [address]  Go to   If symptoms worsen      DISCHARGE MEDICATIONS:  New Prescriptions    ONDANSETRON (ZOFRAN-ODT) 4 MG DISINTEGRATING TABLET    Take 1 tablet by mouth 3 times daily as needed for Nausea or Vomiting       DISCONTINUED MEDICATIONS:  Discontinued Medications    No medications on file            (Please note that portions of this note were completed with a voice recognition program.  Efforts were made to edit the dictations but occasionally words are mis-transcribed.)    Giuliana Davis DO (electronically signed)       Karel Varela DO  Resident  03/04/23 [id number]

## 2023-03-05 NOTE — DISCHARGE INSTRUCTIONS
Increase fluids  Follow-up with primary care doctor  If you notice any new worrisome symptoms please return to emergency department for further evaluation

## 2023-03-06 ENCOUNTER — TELEPHONE (OUTPATIENT)
Dept: FAMILY MEDICINE CLINIC | Age: 78
End: 2023-03-06

## 2023-03-06 ENCOUNTER — CARE COORDINATION (OUTPATIENT)
Dept: OTHER | Facility: CLINIC | Age: 78
End: 2023-03-06

## 2023-03-06 NOTE — TELEPHONE ENCOUNTER
Patient was in the ER with vomiting. Was told he had food poisoning. They did a CT of abdomin and was told he had a cyst on his pancreas. Should he be seen sooner than next appt?

## 2023-03-06 NOTE — CARE COORDINATION
[address] [facility] ED Follow Up Call    3/6/2023    Patient: Cb Little Patient : 1945   MRN: [id number]  Reason for Admission: Near syncope, N/V, Fall, Pancreatic cyst  Discharge Date: 3/4/2023        Care Transitions ED Follow Up    Care Transitions Interventions  Do you have any ongoing symptoms?: No   Do you have a copy of your discharge instructions?: Yes   Do you understand what to report and when to return?: Yes   Are you following your discharge instructions?: Yes   Do you have all of your prescriptions and are they filled?: Yes   Have you scheduled your follow up appointment?:  (Comment: Waiting reply from PCP office to see if ED f/u OV needed)   Were you discharged with any Home Care or Post Acute Services or do you currently have any active services?: No         Do you have any needs or concerns that I can assist you with?: No   Identified Barriers: None          Needs to be reviewed by the provider   Additional needs identified to be addressed with provider No  none         Ambulatory Care Manager (ACM) contacted the patient by telephone to perform post ED visit assessment. Call within 2 business days of discharge: Yes. Verified name and  with patient as identifiers. Assessment:  Patient reports feeling fine since seen in ED on 3/4/23. No falls or vomiting since home. States he ate toast for lunch today as he is staying on bland foods currently. Getting plenty of water to drink. States took Zofran twice yesterday, once today with relief. He called PCP office re: ED follow up and was told provider is not back in until tomorrow but will call him if ED follow up is needed for pancreatic cyst.     Patient states monitors BS PRN at home. He checked BS prior to ED visit and was 160. Has not checked BS since then. He reports compliance with Metformin TID as ordered. States stools start out formed in the morning and will have diarrhea as the day goes on (with Metformin).  Has BM 3-4 times daily and reports bowel movements at baseline. ACM notified patient of BWWD program and discussed enrollment. ACM offered to send patient additional information on the BWWD program. Patient declined offer and states he does not wish to enroll at this time. ACM discussed advantages of MyChart activation (as MyChart account in pending status currently). Offered to send patient activation code via email. Patient accepted offer and verified email address correct as listed in chart. ACM routed activation code to patient. Patient denies needs or concerns for ACM. Denies need for ACM enrollment. ACM sent non enroll letter to patient via email after verbal consent obtained. Interventions:    Counseling and education provided at today's visit on:   Red Flag symptoms to report  Self monitoring compliance  Symptom management  Diet education/compliance  Medication compliance  Provider follow up appointment compliance  Utilization of appropriate level of care: Right Care, Right Place, Right Time  Reinforced Discharge instructions  Advanced Care Planning education    Medication reconciliation was performed with patient, who verbalizes understanding of administration of home medications. Advised obtaining a 90-day supply of all daily and as-needed medications. Reinforced resources available to patient including: PCP  Benefits related nurse triage line. The patient agrees to contact the PCP office or specialist for questions related to their healthcare. ACM encouraged outreach to Nurse Access Line and/or ACM for assessment and intervention guidance as needed. ACM encouraged patient to contact 911 for life threatening emergencies and PCP office 24/7 for non life-threatening symptoms. Reminded patient of alternatives to ED such as urgent care, walk in clinics and e-visits as available. Reviewed proper ED utilization using Right Care, Right Place, Right Time Flyer. Discussed follow-up appointments.  If no appointment was previously scheduled, appointment scheduling offered:  No, patient has been in contact with PCP office re: ED follow up appt. He is waiting return call from office when provider returns tomorrow to discuss ED f/u. Is follow up appointment scheduled within 7 days of discharge? Waiting return call from PCP office  [facility] follow up appointment(s):   Future Appointments   Date Time Provider Moises Byrd   7/19/2023  8:15 AM MD Julieta Merritt [facility]     Non-[facility] follow up appointment(s): None    Plan:  Patient denies needs or concerns for ACM. Declined need for ACM enrollment. ACM signing off but provided contact info to patient via email with instructions to reach out in future PRN. Patient verbalized understanding and is agreeable. Dear Amandeep Chance,          I am sending you this introduction to ACM per our conversation today. Should you feel this program is right for you in the future, please reach out to me at the contact information provided below or ask your primary care physician to refer you to Associate Care Management. [facility] Help Desk: for activation or family member permission [phone]  The Associate Care Management (ACM) program is a free-of-charge confidential service provided to our employees and their family members covered by the [address] [place] [address]. The program will provide an associate and his/her family with the [facility]'s expertise to assist in navigating the health care delivery system, provider services, and their overall care needs-so as to assure and improve health care interactions and enhance the quality of life.  This program is designed to provide you with the opportunity to have a [facility] partner with you for the following services:     1) when you come home from the hospital or emergency room   2) when help is needed to manage your disease   3) when you need assistance coordinating services or appointments       4) when you need additional education, resources or assistance reaching your Be Well Health Program goals/requirements such as Be Well With Diabetes         [facility] is dedicated to empowering the good health of its community and improving the quality of care and care experiences for employees and their families. We are committed to safeguarding patient confidentiality and privacy, assuring that every employee has the respect he or she deserves in managing their health. The information shared with your care manager will not be shared with anyone else aside from those you identify as part of your care team and will only be used to assist you with any identified care needs. Contact 911 for life threatening emergencies and PCP office 24/7 for non-life-threatening symptoms. Call the Nurse Access Line 24/7/365 at [phone] for assessment and intervention guidance as needed. Other Valuable Numbers:  Nurse Access Line 24/7/365 at [phone] for assessment and intervention guidance as needed  [facility] Help Desk: for reactivation or family member permission [phone]  [address] Delivery: [phone] Loco Ralph Poag)  Employee Services: [phone]       [employer]: [phone] (Password: [email or web address])     Have a lilianssed Bryan solano MSN RN  Associate Care Manager  Phone: [phone]  Email: [email or web address]

## 2023-03-07 NOTE — TELEPHONE ENCOUNTER
CALL PATIENT FOR APPOINTMENT IN 1-2 WEEKS. Per Dr Garrett Slade    Scheduled appt.  Spoke to patient

## 2023-03-16 ENCOUNTER — OFFICE VISIT (OUTPATIENT)
Dept: FAMILY MEDICINE CLINIC | Age: 78
End: 2023-03-16
Payer: COMMERCIAL

## 2023-03-16 VITALS
OXYGEN SATURATION: 95 % | BODY MASS INDEX: 33.05 KG/M2 | DIASTOLIC BLOOD PRESSURE: 80 MMHG | WEIGHT: 211 LBS | HEART RATE: 73 BPM | SYSTOLIC BLOOD PRESSURE: 124 MMHG

## 2023-03-16 DIAGNOSIS — E11.22 TYPE 2 DIABETES MELLITUS WITH STAGE 3A CHRONIC KIDNEY DISEASE, WITHOUT LONG-TERM CURRENT USE OF INSULIN (HCC): ICD-10-CM

## 2023-03-16 DIAGNOSIS — I10 ESSENTIAL HYPERTENSION: ICD-10-CM

## 2023-03-16 DIAGNOSIS — N18.31 TYPE 2 DIABETES MELLITUS WITH STAGE 3A CHRONIC KIDNEY DISEASE, WITHOUT LONG-TERM CURRENT USE OF INSULIN (HCC): ICD-10-CM

## 2023-03-16 DIAGNOSIS — E11.9 TYPE 2 DIABETES MELLITUS WITHOUT COMPLICATION, WITHOUT LONG-TERM CURRENT USE OF INSULIN (HCC): Primary | ICD-10-CM

## 2023-03-16 DIAGNOSIS — K86.2 PANCREATIC CYST: ICD-10-CM

## 2023-03-16 DIAGNOSIS — E78.2 MIXED HYPERCHOLESTEROLEMIA AND HYPERTRIGLYCERIDEMIA: ICD-10-CM

## 2023-03-16 DIAGNOSIS — K52.9 ACUTE GASTROENTERITIS: ICD-10-CM

## 2023-03-16 PROCEDURE — 99214 OFFICE O/P EST MOD 30 MIN: CPT | Performed by: FAMILY MEDICINE

## 2023-03-16 PROCEDURE — 3074F SYST BP LT 130 MM HG: CPT | Performed by: FAMILY MEDICINE

## 2023-03-16 PROCEDURE — 1123F ACP DISCUSS/DSCN MKR DOCD: CPT | Performed by: FAMILY MEDICINE

## 2023-03-16 PROCEDURE — 3051F HG A1C>EQUAL 7.0%<8.0%: CPT | Performed by: FAMILY MEDICINE

## 2023-03-16 PROCEDURE — 3079F DIAST BP 80-89 MM HG: CPT | Performed by: FAMILY MEDICINE

## 2023-03-16 NOTE — PATIENT INSTRUCTIONS
LOW SALT FOR BLOOD PRESSURE CONTROL. LOW CARBOHYDRATE FOR BLOOD SUGAR AND WEIGHT CONTROL. LOW FAT DIET FOR CHOLESTEROL CONTROL. DRINK ENOUGH FLUIDS FOR BETTER KIDNEY FUNCTION. TAKE VALSARTAN/-25 DAILY   FOR BLOOD PRESSURE CONTROL. TAKE METFORMIN  MG. 3 TIMES A DAY  FOR BLOOD SUGAR CONTROL. TAKE LIPITOR 10 MG. NIGHTLY   FOR CHOLESTEROL CONTROL. Mikie Lund TAKE  CIALIS 20 MG. AS NEEDED FOR ERECTION PROBLEM. REGULAR WALKING ADVISED. ADVISED WEIGHT REDUCTION. FASTING FOR LAB WORK PRIOR TO NEXT VISIT. NEXT APPOINTMENT IN 1 MONTH.

## 2023-03-16 NOTE — PROGRESS NOTES
OFFICE PROGRESS NOTE      SUBJECTIVE:        Patient ID:   Alissa Elizabeth is a 65 y.o. male who presents for   Chief Complaint   Patient presents with    Follow-up     ER- vomiting, dizziness, fall    Abnormal Test Results     Discuss CT scan           HPI:     RECHECK BP, CHOLESTEROL AND DIABETES.  WAS SEEN AT [facility] ER ON 3/4/2023 FOR ABDOMINAL PAIN WITH NAUSEA AND VOMITING. ALSO HAD FALL DURING THE EPISODE AT HOME. FEELS MUCH BETTER NOW. GETTING AROUND WELL. NO BODILY INJURY AS A RESULT OF FALL. MEDICATION REFILL. FEELS GOOD NOW. WATCHING DIET GOOD. WALKING SOME IN HOUSE FOR EXERCISE. TAKING MEDICATIONS REGULARLY. Prior to Admission medications    Medication Sig Start Date End Date Taking?  Authorizing Provider   atorvastatin (LIPITOR) 10 MG tablet Take 1 tablet by mouth daily 3/1/23 5/30/23 Yes Cristiana Driscoll MD   metFORMIN (GLUCOPHAGE-XR) 500 MG extended release tablet Take 1 tablet by mouth 3 times daily (with meals) 3/1/23  Yes Cristiana Driscoll MD   pantoprazole (PROTONIX) 40 MG tablet Take 1 tablet by mouth every morning (before breakfast) 3/1/23  Yes Cristiana Driscoll MD   valsartan-hydroCHLOROthiazide (DIOVAN HCT) 160-12.5 MG per tablet Take 1 tablet by mouth daily 3/1/23  Yes Cristiana Driscoll MD   tadalafil (CIALIS) 20 MG tablet Take 1 tablet by mouth as needed for Erectile Dysfunction 3/1/23  Yes Cristiana Driscoll MD   Cholecalciferol (VITAMIN D3) 50 MCG (2000 UT) CAPS Take by mouth   Yes Historical Provider, MD   Omega-3 Fatty Acids (FISH OIL) 1000 MG CAPS Take 3,000 mg by mouth daily   Yes Historical Provider, MD   vitamin B-1 (THIAMINE) 100 MG tablet Take 100 mg by mouth daily   Yes Historical Provider, MD   ascorbic acid (VITAMIN C) 500 MG tablet Take 1,000 mg by mouth daily    Yes Historical Provider, MD   Multiple Vitamins-Minerals (THERAPEUTIC MULTIVITAMIN-MINERALS) tablet Take 1 tablet by mouth daily   Yes Historical Provider, MD Rodrigues N Bijan Conroy (GLUCOSAMINE CHOND COMPLEX/MSM PO) Take 2 tablets by mouth daily   Yes Historical Provider, MD     Social History     Socioeconomic History    Marital status:    Tobacco Use    Smoking status: Never    Smokeless tobacco: Never   Substance and Sexual Activity    Alcohol use: No    Drug use: No     Social Determinants of Health     Financial Resource Strain: Low Risk     Difficulty of Paying Living Expenses: Not hard at all   Food Insecurity: No Food Insecurity    Worried About Running Out of Food in the Last Year: Never true    Ran Out of Food in the Last Year: Never true   Transportation Needs: Unknown    Lack of Transportation (Non-Medical): No   Housing Stability: Unknown    Unstable Housing in the Last Year: No       I have reviewed Rolan's allergies, medications, problem list, medical, social and family history and have updated as needed in the electronic medical record  Review Of Systems:    Skin: no abnormal pigmentation, rash, scaling, itching, masses, hair or nail changes  Eyes: no blurring, diplopia, or eye pain  Ears/Nose/Throat: no hearing loss, tinnitus, vertigo, nosebleed, nasal congestion, rhinorrhea, sore throat  Respiratory: no cough, pleuritic chest pain, dyspnea, or wheezing  Cardiovascular: no chest pain, angina, dyspnea on exertion, orthopnea, PND, palpitations, or claudication  Gastrointestinal: no nausea, vomiting, heartburn, diarrhea, constipation, bloating,  abdominal pain, or blood per rectum. Appetite is good  Genitourinary: no urinary urgency, frequency, dysuria, nocturia, hesitancy, or incontinence  Musculoskeletal: joint pains off and on. Morning stiffness.  Ambulating well  Neurologic: no paralysis, paresis, paresthesia, seizures, tremors, or headaches  Hematologic/Lymphatic/Immunologic: no anemia, abnormal bleeding/bruising, fever, chills, night sweats, swollen glands, or unexplained weight loss  Endocrine: no heat or cold intolerance and no polyphagia, polydipsia, or polyuria        OBJECTIVE:     VS:  Wt Readings from Last 3 Encounters:   03/16/23 211 lb (95.7 kg)   03/01/23 210 lb (95.3 kg)   11/30/22 208 lb (94.3 kg)     Temp Readings from Last 3 Encounters:   03/04/23 97.8 °F (36.6 °C) (Oral)   09/15/22 97 °F (36.1 °C)   08/30/22 97 °F (36.1 °C)     BP Readings from Last 3 Encounters:   03/16/23 124/80   03/04/23 (!) 129/59   03/01/23 126/70        General appearance: Alert, Awake, Oriented times 3, no distress  Skin: Warm and dry  Head: Normocephalic. No masses, lesions or tenderness noted  Eyes: Conjunctivae appear normal. PERLE  Ears: External ears normal  Nose/Sinuses: Nares normal. Septum midline. Mucosa normal. No drainage  Oropharynx: Oropharynx clear with no exudate seen  Neck: Neck supple. No jugular venous distension, lymphadenopathy or thyromegaly Trachea midline  Chest:  Normal. Movements are Normal and Equal.  Lungs: Lungs clear to auscultation bilaterally. No ronchi, crackles or wheezes  Heart: S1 S2  Regular rate and rhythm. No rub, murmur or gallop  Abdomen: Abdomen soft, non-tender. BS normal. No masses, organomegaly. Back: Grossly Normal and Equal. DTR are Normal. SLR is Normal.  Extremities: Arthritic changes are noted. Movements are limited. Pedal pulses are normal.  Musculoskeletal: Muscular strength appears intact. No joint effusion, tenderness, swelling or warmth  Neuro:  No focal motor or sensory deficits        ASSESSMENT     Patient Active Problem List    Diagnosis Date Noted    Mixed hypercholesterolemia and hypertriglyceridemia 02/08/2021    Type 2 diabetes mellitus with chronic kidney disease 03/16/2023    Hypovitaminosis D 08/30/2022    Primary osteoarthritis of both knees 03/21/2022    CKD stage 3 secondary to diabetes ([address] 06/24/2021    Type 2 diabetes mellitus without complication, without long-term current use of insulin ([facility] [address] 02/08/2021    Essential hypertension 02/08/2021        Diagnosis:     ICD-10-CM    1.  Type 2 diabetes mellitus without complication, without long-term current use of insulin (HCC)  E11.9     FAIR CONTROL      2. Type 2 diabetes mellitus with stage 3a chronic kidney disease, without long-term current use of insulin (HCC)  E11.22     N18.31     STABLE      3. Mixed hypercholesterolemia and hypertriglyceridemia  E78.2     ? CONTROL      4. Essential hypertension  I10     CONTROLLED      5. Acute gastroenteritis  K52.9     RESOLVED. 6. Pancreatic cyst  K86.2     NEW FINDING          PLAN:     ER RECORD INCLUDING BLOOD WORK AND XR STUDIES REVIEWED AND DISCUSSED WITH PATIENT. Patient Instructions   LOW SALT FOR BLOOD PRESSURE CONTROL. LOW CARBOHYDRATE FOR BLOOD SUGAR AND WEIGHT CONTROL. LOW FAT DIET FOR CHOLESTEROL CONTROL. DRINK ENOUGH FLUIDS FOR BETTER KIDNEY FUNCTION. TAKE VALSARTAN/-25 DAILY   FOR BLOOD PRESSURE CONTROL. TAKE METFORMIN  MG. 3 TIMES A DAY  FOR BLOOD SUGAR CONTROL. TAKE LIPITOR 10 MG. NIGHTLY   FOR CHOLESTEROL CONTROL. Flaherty Potters TAKE  CIALIS 20 MG. AS NEEDED FOR ERECTION PROBLEM. REGULAR WALKING ADVISED. ADVISED WEIGHT REDUCTION. FASTING FOR LAB WORK PRIOR TO NEXT VISIT. NEXT APPOINTMENT IN 1 MONTH. Return in about 1 month (around 4/16/2023) for FOLLOW UP VISIT. I have reviewed my findings and recommendations with Denys Shelby.     Electronically signed by Shayla Santa MD on 3/16/23 at 10:44 AM EDT

## 2023-07-17 DIAGNOSIS — E55.9 HYPOVITAMINOSIS D: ICD-10-CM

## 2023-07-17 DIAGNOSIS — E78.2 MIXED HYPERCHOLESTEROLEMIA AND HYPERTRIGLYCERIDEMIA: ICD-10-CM

## 2023-07-17 DIAGNOSIS — Z12.5 SCREENING FOR PROSTATE CANCER: ICD-10-CM

## 2023-07-18 LAB
ALBUMIN SERPL-MCNC: 4.4 G/DL (ref 3.5–5.2)
ALP BLD-CCNC: 92 U/L (ref 40–129)
ALT SERPL-CCNC: 15 U/L (ref 0–40)
ANION GAP SERPL CALCULATED.3IONS-SCNC: 14 MMOL/L (ref 7–16)
AST SERPL-CCNC: 24 U/L (ref 0–39)
BILIRUB SERPL-MCNC: 0.6 MG/DL (ref 0–1.2)
BUN BLDV-MCNC: 21 MG/DL (ref 6–23)
CALCIUM SERPL-MCNC: 9.3 MG/DL (ref 8.6–10.2)
CHLORIDE BLD-SCNC: 103 MMOL/L (ref 98–107)
CHOLESTEROL: 142 MG/DL
CO2: 24 MMOL/L (ref 22–29)
CREAT SERPL-MCNC: 1.3 MG/DL (ref 0.7–1.2)
GFR SERPL CREATININE-BSD FRML MDRD: 54 ML/MIN/1.73M2
GLUCOSE BLD-MCNC: 124 MG/DL (ref 74–99)
HDLC SERPL-MCNC: 41 MG/DL
LDL CHOLESTEROL: 79 MG/DL
POTASSIUM SERPL-SCNC: 4.4 MMOL/L (ref 3.5–5)
PROSTATE SPECIFIC ANTIGEN: 3 NG/ML (ref 0–4)
SODIUM BLD-SCNC: 141 MMOL/L (ref 132–146)
TOTAL PROTEIN: 6.8 G/DL (ref 6.4–8.3)
TRIGL SERPL-MCNC: 110 MG/DL
VLDLC SERPL CALC-MCNC: 22 MG/DL

## 2023-07-18 NOTE — RESULT ENCOUNTER NOTE
KIDNEY FUNCTION BORDERLINE LOW. WILL MONITOR. BLOOD SUGAR LEVEL ARE BORDERLINE HIGH. RECOMMEND:  LOW SALT, LOW CARB. AND LOW FAT DIET. REGULAR EXERCISE. CONTINUE CURRENT MEDICATIONS. DISCUSS NEXT VISIT.

## 2023-07-19 ENCOUNTER — OFFICE VISIT (OUTPATIENT)
Dept: FAMILY MEDICINE CLINIC | Age: 78
End: 2023-07-19
Payer: COMMERCIAL

## 2023-07-19 VITALS
HEIGHT: 67 IN | SYSTOLIC BLOOD PRESSURE: 128 MMHG | DIASTOLIC BLOOD PRESSURE: 74 MMHG | OXYGEN SATURATION: 98 % | BODY MASS INDEX: 31.86 KG/M2 | HEART RATE: 74 BPM | WEIGHT: 203 LBS

## 2023-07-19 DIAGNOSIS — N18.30 CKD STAGE 3 SECONDARY TO DIABETES (HCC): ICD-10-CM

## 2023-07-19 DIAGNOSIS — Z91.81 AT HIGH RISK FOR FALLS: ICD-10-CM

## 2023-07-19 DIAGNOSIS — I10 ESSENTIAL HYPERTENSION: ICD-10-CM

## 2023-07-19 DIAGNOSIS — E11.22 CKD STAGE 3 SECONDARY TO DIABETES (HCC): ICD-10-CM

## 2023-07-19 DIAGNOSIS — E78.2 MIXED HYPERCHOLESTEROLEMIA AND HYPERTRIGLYCERIDEMIA: ICD-10-CM

## 2023-07-19 DIAGNOSIS — E11.9 TYPE 2 DIABETES MELLITUS WITHOUT COMPLICATION, WITHOUT LONG-TERM CURRENT USE OF INSULIN (HCC): Primary | ICD-10-CM

## 2023-07-19 DIAGNOSIS — K86.2 PANCREATIC CYST: ICD-10-CM

## 2023-07-19 DIAGNOSIS — E55.9 HYPOVITAMINOSIS D: ICD-10-CM

## 2023-07-19 LAB
HBA1C MFR BLD: 6.9 %
VITAMIN D 25-HYDROXY: 25.6 NG/ML (ref 30–100)

## 2023-07-19 PROCEDURE — 83037 HB GLYCOSYLATED A1C HOME DEV: CPT | Performed by: FAMILY MEDICINE

## 2023-07-19 PROCEDURE — 3044F HG A1C LEVEL LT 7.0%: CPT | Performed by: FAMILY MEDICINE

## 2023-07-19 PROCEDURE — 3078F DIAST BP <80 MM HG: CPT | Performed by: FAMILY MEDICINE

## 2023-07-19 PROCEDURE — 99214 OFFICE O/P EST MOD 30 MIN: CPT | Performed by: FAMILY MEDICINE

## 2023-07-19 PROCEDURE — 3074F SYST BP LT 130 MM HG: CPT | Performed by: FAMILY MEDICINE

## 2023-07-19 PROCEDURE — 1123F ACP DISCUSS/DSCN MKR DOCD: CPT | Performed by: FAMILY MEDICINE

## 2023-07-19 RX ORDER — KETOCONAZOLE 20 MG/ML
SHAMPOO TOPICAL
Qty: 120 ML | Refills: 1 | Status: SHIPPED | OUTPATIENT
Start: 2023-07-19

## 2023-07-19 RX ORDER — TADALAFIL 20 MG/1
40 TABLET ORAL PRN
Qty: 40 TABLET | Refills: 1 | Status: SHIPPED
Start: 2023-07-19 | End: 2023-07-20 | Stop reason: SDUPTHER

## 2023-07-19 RX ORDER — METFORMIN HYDROCHLORIDE 500 MG/1
500 TABLET, EXTENDED RELEASE ORAL
Qty: 270 TABLET | Refills: 1 | Status: SHIPPED | OUTPATIENT
Start: 2023-07-19

## 2023-07-19 RX ORDER — PANTOPRAZOLE SODIUM 40 MG/1
40 TABLET, DELAYED RELEASE ORAL
Qty: 90 TABLET | Refills: 1 | Status: SHIPPED | OUTPATIENT
Start: 2023-07-19

## 2023-07-19 RX ORDER — VALSARTAN AND HYDROCHLOROTHIAZIDE 160; 12.5 MG/1; MG/1
1 TABLET, FILM COATED ORAL DAILY
Qty: 90 TABLET | Refills: 1 | Status: SHIPPED | OUTPATIENT
Start: 2023-07-19

## 2023-07-19 RX ORDER — ATORVASTATIN CALCIUM 10 MG/1
10 TABLET, FILM COATED ORAL DAILY
Qty: 90 TABLET | Refills: 1 | Status: SHIPPED | OUTPATIENT
Start: 2023-07-19 | End: 2024-01-15

## 2023-07-19 NOTE — PATIENT INSTRUCTIONS
LOW SALT FOR BLOOD PRESSURE CONTROL. LOW CARBOHYDRATE FOR BLOOD SUGAR AND WEIGHT CONTROL. LOW FAT DIET FOR CHOLESTEROL CONTROL. DRINK ENOUGH FLUIDS FOR BETTER KIDNEY FUNCTION. TAKE VALSARTAN/-25 DAILY   FOR BLOOD PRESSURE CONTROL. TAKE METFORMIN  MG. 3 TIMES A DAY  FOR BLOOD SUGAR CONTROL. TAKE LIPITOR 10 MG. NIGHTLY   FOR CHOLESTEROL CONTROL. Ileana Aquino TAKE  CIALIS 40 MG. AS NEEDED FOR ERECTION PROBLEM. REGULAR WALKING ADVISED. ADVISED WEIGHT REDUCTION. CT SCAN ABDOMEN AS SCHEDULED. NEXT APPOINTMENT IN 3 MONTHS.

## 2023-07-19 NOTE — PROGRESS NOTES
OFFICE PROGRESS NOTE      SUBJECTIVE:        Patient ID:   Haven Hermosillo is a 65 y.o. male who presents for   Chief Complaint   Patient presents with    Hypertension    Diabetes           HPI:     RECHECK BP, CHOLESTEROL AND DIABETES. CIALIS NOT HELPING ENOUGH AND HENCE TAKING 2 TABLETS AT A TIME. THAT HELPS TO HOLD ERECTION FOR 15 MINUTES. NEEDS REPEAT CT SCAN TO CHECK FOR PANCREATIC  CYST STATUS. MEDICATION REFILL. FEELS GOOD. WATCHING DIET GOOD. WALKING FOR EXERCISE. TAKING MEDICATIONS REGULARLY. Prior to Admission medications    Medication Sig Start Date End Date Taking?  Authorizing Provider   valsartan-hydroCHLOROthiazide (DIOVAN HCT) 160-12.5 MG per tablet Take 1 tablet by mouth daily 7/19/23  Yes Ian Bailey MD   pantoprazole (PROTONIX) 40 MG tablet Take 1 tablet by mouth every morning (before breakfast) 7/19/23  Yes Ian Bailey MD   atorvastatin (LIPITOR) 10 MG tablet Take 1 tablet by mouth daily 7/19/23 1/15/24 Yes Ian Bailey MD   metFORMIN (GLUCOPHAGE-XR) 500 MG extended release tablet Take 1 tablet by mouth 3 times daily (with meals) 7/19/23  Yes Ian Bailey MD   tadalafil (CIALIS) 20 MG tablet Take 2 tablets by mouth as needed for Erectile Dysfunction TAKE 2 TABLETS BY MOUTH AS NEEDED FOR ERECTILE DYSFUNCTION 7/19/23  Yes Ian Bailey MD   Cholecalciferol (VITAMIN D3) 50 MCG (2000 UT) CAPS Take by mouth   Yes Historical Provider, MD   Omega-3 Fatty Acids (FISH OIL) 1000 MG CAPS Take 3 capsules by mouth daily   Yes Historical Provider, MD   vitamin B-1 (THIAMINE) 100 MG tablet Take 1 tablet by mouth daily   Yes Historical Provider, MD   ascorbic acid (VITAMIN C) 500 MG tablet Take 2 tablets by mouth daily   Yes Historical Provider, MD   Multiple Vitamins-Minerals (THERAPEUTIC MULTIVITAMIN-MINERALS) tablet Take 1 tablet by mouth daily   Yes Historical Provider, MD   Misc Natural Products (GLUCOSAMINE CHOND COMPLEX/MSM PO) Take 2

## 2023-07-19 NOTE — RESULT ENCOUNTER NOTE
VITAMIN D LEVEL LOW. TAKE VITAMIN D-3 2000 UNITS DAILY REGULARLY. PLEASE ACKNOWLEDGE RECEIPT OF INFORMATION BY REPLYING THE MESSAGE. THANKS.

## 2023-07-20 ENCOUNTER — TELEPHONE (OUTPATIENT)
Dept: FAMILY MEDICINE CLINIC | Age: 78
End: 2023-07-20

## 2023-07-20 RX ORDER — TADALAFIL 20 MG/1
40 TABLET ORAL PRN
Qty: 40 TABLET | Refills: 1 | Status: SHIPPED | OUTPATIENT
Start: 2023-07-20

## 2023-07-20 NOTE — TELEPHONE ENCOUNTER
Patient called asking to have the  tadalafil (CIALIS) 20 MG tablet  Sent to the pharmacy. He picked all meds up and this one was not called in.    Last seen 7/19/2023  Next appt 10/25/2023  [facility]

## 2023-07-21 ENCOUNTER — TELEPHONE (OUTPATIENT)
Dept: FAMILY MEDICINE CLINIC | Age: 78
End: 2023-07-21

## 2023-07-21 NOTE — TELEPHONE ENCOUNTER
[facility] pharmacist called regarding RX for Cialis, informing the maximum dosage is generally 20 mg daily. Please advise.     Last seen 7/19/2023  Next appt 10/25/2023

## 2023-07-26 RX ORDER — TADALAFIL 20 MG/1
20 TABLET ORAL PRN
Qty: 20 TABLET | Refills: 1 | Status: SHIPPED | OUTPATIENT
Start: 2023-07-26

## 2023-07-27 NOTE — TELEPHONE ENCOUNTER
I called patient and informed him new RX was sent to pharmacy, due to recommended dosage being 20 mg. Patient stated understanding.

## 2023-08-08 ENCOUNTER — TELEPHONE (OUTPATIENT)
Dept: FAMILY MEDICINE CLINIC | Age: 78
End: 2023-08-08

## 2023-08-08 DIAGNOSIS — R93.89 ABNORMAL CT SCAN: Primary | ICD-10-CM

## 2023-08-08 NOTE — TELEPHONE ENCOUNTER
----- Message from Liam Chung sent at 8/7/2023  3:36 PM EDT -----  Subject: Referral Request    Reason for referral request? Urologist referral  Provider patient wants to be referred to(if known): Mitchell Hinton    Provider Phone Number(if known): Additional Information for Provider? Pt needs a call back to discuss more   details.    ---------------------------------------------------------------------------  --------------  Brett MCGEE    [phone]; OK to leave message on voicemail  ---------------------------------------------------------------------------  --------------

## 2023-09-15 ENCOUNTER — TELEPHONE (OUTPATIENT)
Dept: FAMILY MEDICINE CLINIC | Age: 78
End: 2023-09-15

## 2023-09-15 DIAGNOSIS — S99.829A TOENAIL TORN AWAY: Primary | ICD-10-CM

## 2023-10-17 ENCOUNTER — TELEPHONE (OUTPATIENT)
Dept: AUDIOLOGY | Age: 78
End: 2023-10-17

## 2023-10-17 NOTE — TELEPHONE ENCOUNTER
Please call patient to schedule hearing aid check appointment, at [facility].     Contact number:  [phone]    Subhash Bennett CCC/SAVANA  Audiologist  [id number]  NPI#:  [id number]

## 2023-10-18 ENCOUNTER — HOSPITAL ENCOUNTER (OUTPATIENT)
Dept: AUDIOLOGY | Age: 78
Discharge: HOME | End: 2023-10-18

## 2023-10-18 PROCEDURE — 9990000010 HC NO CHARGE VISIT: Performed by: AUDIOLOGIST

## 2023-10-18 NOTE — PROGRESS NOTES
The patient came in for a hearing aid check. He reported that he turns the volume on his left hearing aid up when he wears it. Left hearing aid changed to adaptation level 3, right left at level 2. Patient reported sound quality was more balanced. Patient also reported that his hearing aids were not pairing to his phone. Hearing aids paired and patient was satisfied. He will return as needed.     Electronically signed by Omari Naik on 10/18/2023 at 4:53 PM

## 2023-10-25 ENCOUNTER — OFFICE VISIT (OUTPATIENT)
Dept: FAMILY MEDICINE CLINIC | Age: 78
End: 2023-10-25
Payer: COMMERCIAL

## 2023-10-25 VITALS
OXYGEN SATURATION: 97 % | WEIGHT: 209 LBS | DIASTOLIC BLOOD PRESSURE: 70 MMHG | SYSTOLIC BLOOD PRESSURE: 120 MMHG | HEART RATE: 85 BPM | BODY MASS INDEX: 32.73 KG/M2

## 2023-10-25 DIAGNOSIS — E78.2 MIXED HYPERCHOLESTEROLEMIA AND HYPERTRIGLYCERIDEMIA: ICD-10-CM

## 2023-10-25 DIAGNOSIS — N18.30 CKD STAGE 3 SECONDARY TO DIABETES (HCC): ICD-10-CM

## 2023-10-25 DIAGNOSIS — I10 ESSENTIAL HYPERTENSION: ICD-10-CM

## 2023-10-25 DIAGNOSIS — E11.22 CKD STAGE 3 SECONDARY TO DIABETES (HCC): ICD-10-CM

## 2023-10-25 DIAGNOSIS — E55.9 HYPOVITAMINOSIS D: ICD-10-CM

## 2023-10-25 DIAGNOSIS — E11.9 TYPE 2 DIABETES MELLITUS WITHOUT COMPLICATION, WITHOUT LONG-TERM CURRENT USE OF INSULIN (HCC): Primary | ICD-10-CM

## 2023-10-25 DIAGNOSIS — K86.2 PANCREATIC CYST: ICD-10-CM

## 2023-10-25 LAB — HBA1C MFR BLD: 6.6 %

## 2023-10-25 PROCEDURE — 83036 HEMOGLOBIN GLYCOSYLATED A1C: CPT | Performed by: FAMILY MEDICINE

## 2023-10-25 PROCEDURE — 3044F HG A1C LEVEL LT 7.0%: CPT | Performed by: FAMILY MEDICINE

## 2023-10-25 PROCEDURE — 3078F DIAST BP <80 MM HG: CPT | Performed by: FAMILY MEDICINE

## 2023-10-25 PROCEDURE — 1123F ACP DISCUSS/DSCN MKR DOCD: CPT | Performed by: FAMILY MEDICINE

## 2023-10-25 PROCEDURE — 99214 OFFICE O/P EST MOD 30 MIN: CPT | Performed by: FAMILY MEDICINE

## 2023-10-25 PROCEDURE — 3074F SYST BP LT 130 MM HG: CPT | Performed by: FAMILY MEDICINE

## 2023-10-25 RX ORDER — TADALAFIL 20 MG/1
40 TABLET ORAL PRN
Qty: 40 TABLET | Refills: 1 | Status: SHIPPED | OUTPATIENT
Start: 2023-10-25

## 2023-10-25 RX ORDER — METFORMIN HYDROCHLORIDE 500 MG/1
500 TABLET, EXTENDED RELEASE ORAL
Qty: 270 TABLET | Refills: 1 | Status: SHIPPED | OUTPATIENT
Start: 2023-10-25

## 2023-10-25 RX ORDER — VALSARTAN AND HYDROCHLOROTHIAZIDE 160; 12.5 MG/1; MG/1
1 TABLET, FILM COATED ORAL DAILY
Qty: 90 TABLET | Refills: 1 | Status: SHIPPED | OUTPATIENT
Start: 2023-10-25

## 2023-10-25 RX ORDER — ATORVASTATIN CALCIUM 10 MG/1
10 TABLET, FILM COATED ORAL DAILY
Qty: 90 TABLET | Refills: 1 | Status: SHIPPED | OUTPATIENT
Start: 2023-10-25 | End: 2024-04-22

## 2023-10-25 RX ORDER — PANTOPRAZOLE SODIUM 40 MG/1
40 TABLET, DELAYED RELEASE ORAL
Qty: 90 TABLET | Refills: 1 | Status: SHIPPED | OUTPATIENT
Start: 2023-10-25

## 2023-10-25 NOTE — PATIENT INSTRUCTIONS
LOW SALT FOR BLOOD PRESSURE CONTROL. LOW CARBOHYDRATE FOR BLOOD SUGAR AND WEIGHT CONTROL. LOW FAT DIET FOR CHOLESTEROL CONTROL. DRINK ENOUGH FLUIDS FOR BETTER KIDNEY FUNCTION. TAKE VALSARTAN/-25 DAILY   FOR BLOOD PRESSURE CONTROL. TAKE METFORMIN  MG. 3 TIMES A DAY  FOR BLOOD SUGAR CONTROL. TAKE LIPITOR 10 MG. NIGHTLY   FOR CHOLESTEROL CONTROL. Naila Salk TAKE  CIALIS 40 MG. AS NEEDED FOR ERECTION PROBLEM. REGULAR WALKING ADVISED. ADVISED WEIGHT REDUCTION. SEE DR. SHIREEN Sanford  FOR PANCREATIC CYST EVALUATION. FASTING FOR LAB WORK PRIOR TO NEXT VISIT. NEXT APPOINTMENT IN 3 MONTHS.

## 2023-10-26 ENCOUNTER — TELEPHONE (OUTPATIENT)
Dept: HEMATOLOGY | Age: 78
End: 2023-10-26

## 2023-10-26 NOTE — TELEPHONE ENCOUNTER
The patient is scheduled for his new patient appt on 11/06/2023 at 9:30 am in our [place] office. The patient is familiar with the office out there. He was informed to bring his photo id and insurance card with him.  The patient confirmed all of the above  Electronically signed by Ama Montiel MA on 10/26/2023 at 9:14 AM

## 2023-11-06 ENCOUNTER — TELEPHONE (OUTPATIENT)
Dept: SURGERY | Age: 78
End: 2023-11-06

## 2023-11-06 ENCOUNTER — OFFICE VISIT (OUTPATIENT)
Dept: SURGERY | Age: 78
End: 2023-11-06
Payer: COMMERCIAL

## 2023-11-06 VITALS
HEIGHT: 67 IN | HEART RATE: 87 BPM | BODY MASS INDEX: 32.49 KG/M2 | WEIGHT: 207 LBS | OXYGEN SATURATION: 98 % | DIASTOLIC BLOOD PRESSURE: 64 MMHG | SYSTOLIC BLOOD PRESSURE: 131 MMHG | TEMPERATURE: 98.1 F

## 2023-11-06 DIAGNOSIS — K86.2 CYSTIC MASS OF PANCREAS: Primary | ICD-10-CM

## 2023-11-06 DIAGNOSIS — E78.2 MIXED HYPERCHOLESTEROLEMIA AND HYPERTRIGLYCERIDEMIA: ICD-10-CM

## 2023-11-06 DIAGNOSIS — K86.2 CYSTIC MASS OF PANCREAS: ICD-10-CM

## 2023-11-06 LAB
ANION GAP SERPL CALCULATED.3IONS-SCNC: 16 MMOL/L (ref 7–16)
BUN BLDV-MCNC: 20 MG/DL (ref 6–23)
CALCIUM SERPL-MCNC: 9.2 MG/DL (ref 8.6–10.2)
CHLORIDE BLD-SCNC: 101 MMOL/L (ref 98–107)
CO2: 26 MMOL/L (ref 22–29)
CREAT SERPL-MCNC: 1.2 MG/DL (ref 0.7–1.2)
GFR SERPL CREATININE-BSD FRML MDRD: >60 ML/MIN/1.73M2
GLUCOSE BLD-MCNC: 137 MG/DL (ref 74–99)
POTASSIUM SERPL-SCNC: 4.1 MMOL/L (ref 3.5–5)
SODIUM BLD-SCNC: 143 MMOL/L (ref 132–146)

## 2023-11-06 PROCEDURE — 99204 OFFICE O/P NEW MOD 45 MIN: CPT | Performed by: STUDENT IN AN ORGANIZED HEALTH CARE EDUCATION/TRAINING PROGRAM

## 2023-11-06 PROCEDURE — 3075F SYST BP GE 130 - 139MM HG: CPT | Performed by: STUDENT IN AN ORGANIZED HEALTH CARE EDUCATION/TRAINING PROGRAM

## 2023-11-06 PROCEDURE — 3078F DIAST BP <80 MM HG: CPT | Performed by: STUDENT IN AN ORGANIZED HEALTH CARE EDUCATION/TRAINING PROGRAM

## 2023-11-06 PROCEDURE — 1123F ACP DISCUSS/DSCN MKR DOCD: CPT | Performed by: STUDENT IN AN ORGANIZED HEALTH CARE EDUCATION/TRAINING PROGRAM

## 2023-11-06 ASSESSMENT — ENCOUNTER SYMPTOMS
EYES NEGATIVE: 1
GASTROINTESTINAL NEGATIVE: 1
ALLERGIC/IMMUNOLOGIC NEGATIVE: 1
RESPIRATORY NEGATIVE: 1

## 2023-11-06 NOTE — TELEPHONE ENCOUNTER
MRI abdomen scheduled for Julieta on 11/17/23. Appt 915am, scan 9am, NPO 3 hours. Spoke to Damon and he confirmed appt details and instructions. A follow up appt was scheduled with Dr. Jeremy Brandt office.

## 2023-11-06 NOTE — PROGRESS NOTES
03/04/2023    Narrative  EXAMINATION:  ONE XRAY VIEW OF THE CHEST    3/4/2023 6:41 pm    COMPARISON:  None. HISTORY:  ORDERING SYSTEM PROVIDED HISTORY: fall  TECHNOLOGIST PROVIDED HISTORY:  Reason for exam:->fall    FINDINGS:  Single AP upright portable chest demonstrates tortuosity of the aorta without  cardiomegaly. The lungs are clear and there is no evidence of a pneumothorax. Impression  No acute cardiopulmonary process. CT Result (most recent):  CT ABDOMEN PELVIS W IV CONTRAST 07/27/2023    Narrative  EXAMINATION:  CT OF THE ABDOMEN AND PELVIS WITH CONTRAST 7/27/2023 10:53 am    TECHNIQUE:  CT of the abdomen and pelvis was performed with the administration of  intravenous contrast. Multiplanar reformatted images are provided for review. Automated exposure control, iterative reconstruction, and/or weight based  adjustment of the mA/kV was utilized to reduce the radiation dose to as low  as reasonably achievable. COMPARISON:  The previous study performed 03/04/2023. HISTORY:  ORDERING SYSTEM PROVIDED HISTORY: Pancreatic cyst  TECHNOLOGIST PROVIDED HISTORY:  Additional Contrast?->Radiologist Recommendation  STAT Creatinine as needed:->Yes  Reason for exam:->PENCREATIC CYST PROGRESSION. FINDINGS:  Lower Chest: The lung bases are clear. A trace pericardial effusion is again  noted. Organs: Again seen in the inferior aspect of the right lobe of the liver are  mildly dilated bile ducts, with no other associated abnormality. This is  without significant interval change. The spleen, gallbladder, biliary tree,  adrenal glands, and kidneys are unremarkable. Again seen is a 0.9 cm cyst in  the tail of the pancreas. It is without significant interval change. Follow-up imaging every 2 years for 5 years with pancreas protocol by CT or  MRI is recommended. Follow-up can be stopped if stable over 10 years. GI/Bowel: A small hiatal hernia is again seen.   A small 2nd portion of  duodenum

## 2023-11-08 LAB — CA 19-9: 7 U/ML (ref 0–35)

## 2023-11-20 ENCOUNTER — OFFICE VISIT (OUTPATIENT)
Dept: SURGERY | Age: 78
End: 2023-11-20
Payer: COMMERCIAL

## 2023-11-20 VITALS
WEIGHT: 214 LBS | SYSTOLIC BLOOD PRESSURE: 122 MMHG | HEIGHT: 67 IN | BODY MASS INDEX: 33.59 KG/M2 | OXYGEN SATURATION: 100 % | HEART RATE: 94 BPM | DIASTOLIC BLOOD PRESSURE: 70 MMHG

## 2023-11-20 DIAGNOSIS — D49.0 IPMN (INTRADUCTAL PAPILLARY MUCINOUS NEOPLASM): Primary | ICD-10-CM

## 2023-11-20 PROCEDURE — 1123F ACP DISCUSS/DSCN MKR DOCD: CPT | Performed by: STUDENT IN AN ORGANIZED HEALTH CARE EDUCATION/TRAINING PROGRAM

## 2023-11-20 PROCEDURE — 3078F DIAST BP <80 MM HG: CPT | Performed by: STUDENT IN AN ORGANIZED HEALTH CARE EDUCATION/TRAINING PROGRAM

## 2023-11-20 PROCEDURE — 99214 OFFICE O/P EST MOD 30 MIN: CPT | Performed by: STUDENT IN AN ORGANIZED HEALTH CARE EDUCATION/TRAINING PROGRAM

## 2023-11-20 PROCEDURE — 3074F SYST BP LT 130 MM HG: CPT | Performed by: STUDENT IN AN ORGANIZED HEALTH CARE EDUCATION/TRAINING PROGRAM

## 2024-01-22 NOTE — TELEPHONE ENCOUNTER
----- Message from Jennifer Verma sent at 1/22/2024 10:22 AM EST -----  Subject: Refill Request    QUESTIONS  Name of Medication? valsartan-hydroCHLOROthiazide (DIOVAN HCT) 160-12.5 MG   per tablet  Patient-reported dosage and instructions? take once a day  How many days do you have left? 1  Preferred Pharmacy? [id number]  Pharmacy phone number (if available)? [phone]  ---------------------------------------------------------------------------  --------------  CALL BACK INFO  What is the best way for the office to contact you? OK to leave message on   voicemail  Preferred Call Back Phone Number? [phone]  ---------------------------------------------------------------------------  --------------  SCRIPT ANSWERS  Relationship to Patient? Self

## 2024-01-23 RX ORDER — VALSARTAN AND HYDROCHLOROTHIAZIDE 160; 12.5 MG/1; MG/1
1 TABLET, FILM COATED ORAL DAILY
Qty: 90 TABLET | Refills: 0 | Status: SHIPPED | OUTPATIENT
Start: 2024-01-23

## 2024-01-31 ENCOUNTER — OFFICE VISIT (OUTPATIENT)
Dept: FAMILY MEDICINE CLINIC | Age: 79
End: 2024-01-31
Payer: COMMERCIAL

## 2024-01-31 VITALS
BODY MASS INDEX: 32.58 KG/M2 | SYSTOLIC BLOOD PRESSURE: 120 MMHG | WEIGHT: 208 LBS | OXYGEN SATURATION: 97 % | HEART RATE: 92 BPM | DIASTOLIC BLOOD PRESSURE: 72 MMHG

## 2024-01-31 DIAGNOSIS — I10 ESSENTIAL HYPERTENSION: ICD-10-CM

## 2024-01-31 DIAGNOSIS — E78.2 MIXED HYPERCHOLESTEROLEMIA AND HYPERTRIGLYCERIDEMIA: ICD-10-CM

## 2024-01-31 DIAGNOSIS — E11.22 CKD STAGE 3 SECONDARY TO DIABETES (HCC): ICD-10-CM

## 2024-01-31 DIAGNOSIS — E11.9 TYPE 2 DIABETES MELLITUS WITHOUT COMPLICATION, WITHOUT LONG-TERM CURRENT USE OF INSULIN (HCC): Primary | ICD-10-CM

## 2024-01-31 DIAGNOSIS — N18.30 CKD STAGE 3 SECONDARY TO DIABETES (HCC): ICD-10-CM

## 2024-01-31 DIAGNOSIS — K86.2 PANCREATIC CYST: ICD-10-CM

## 2024-01-31 DIAGNOSIS — E55.9 HYPOVITAMINOSIS D: ICD-10-CM

## 2024-01-31 LAB — HBA1C MFR BLD: 6.6 %

## 2024-01-31 PROCEDURE — 3044F HG A1C LEVEL LT 7.0%: CPT | Performed by: FAMILY MEDICINE

## 2024-01-31 PROCEDURE — 3078F DIAST BP <80 MM HG: CPT | Performed by: FAMILY MEDICINE

## 2024-01-31 PROCEDURE — 1123F ACP DISCUSS/DSCN MKR DOCD: CPT | Performed by: FAMILY MEDICINE

## 2024-01-31 PROCEDURE — 99214 OFFICE O/P EST MOD 30 MIN: CPT | Performed by: FAMILY MEDICINE

## 2024-01-31 PROCEDURE — 83036 HEMOGLOBIN GLYCOSYLATED A1C: CPT | Performed by: FAMILY MEDICINE

## 2024-01-31 PROCEDURE — 3074F SYST BP LT 130 MM HG: CPT | Performed by: FAMILY MEDICINE

## 2024-01-31 RX ORDER — VALSARTAN AND HYDROCHLOROTHIAZIDE 160; 12.5 MG/1; MG/1
1 TABLET, FILM COATED ORAL DAILY
Qty: 90 TABLET | Refills: 0 | Status: SHIPPED | OUTPATIENT
Start: 2024-01-31

## 2024-01-31 RX ORDER — PANTOPRAZOLE SODIUM 40 MG/1
40 TABLET, DELAYED RELEASE ORAL
Qty: 90 TABLET | Refills: 1 | Status: SHIPPED | OUTPATIENT
Start: 2024-01-31

## 2024-01-31 RX ORDER — ATORVASTATIN CALCIUM 10 MG/1
10 TABLET, FILM COATED ORAL DAILY
Qty: 90 TABLET | Refills: 1 | Status: SHIPPED | OUTPATIENT
Start: 2024-01-31 | End: 2024-07-29

## 2024-01-31 RX ORDER — METFORMIN HYDROCHLORIDE 500 MG/1
500 TABLET, EXTENDED RELEASE ORAL
Qty: 270 TABLET | Refills: 1 | Status: SHIPPED | OUTPATIENT
Start: 2024-01-31

## 2024-01-31 RX ORDER — TADALAFIL 20 MG/1
40 TABLET ORAL PRN
Qty: 40 TABLET | Refills: 1 | Status: SHIPPED | OUTPATIENT
Start: 2024-01-31

## 2024-01-31 ASSESSMENT — PATIENT HEALTH QUESTIONNAIRE - PHQ9
SUM OF ALL RESPONSES TO PHQ QUESTIONS 1-9: 0
SUM OF ALL RESPONSES TO PHQ QUESTIONS 1-9: 0
2. FEELING DOWN, DEPRESSED OR HOPELESS: 0
SUM OF ALL RESPONSES TO PHQ QUESTIONS 1-9: 0
SUM OF ALL RESPONSES TO PHQ9 QUESTIONS 1 & 2: 0
1. LITTLE INTEREST OR PLEASURE IN DOING THINGS: 0
SUM OF ALL RESPONSES TO PHQ QUESTIONS 1-9: 0

## 2024-01-31 NOTE — PROGRESS NOTES
OFFICE PROGRESS NOTE      SUBJECTIVE:        Patient ID:   Alexander Rivera is a 78 y.o. male who presents for   Chief Complaint   Patient presents with    Hypertension    Diabetes           HPI:     RECHECK BP, CHOLESTEROL AND DIABETES.  MEDICATION REFILL.  FEELS GOOD.   WATCHING DIET GOOD.  WALKING SOME IN HOUSE FOR EXERCISE.  TAKING MEDICATIONS REGULARLY.         Prior to Admission medications    Medication Sig Start Date End Date Taking? Authorizing Provider   valsartan-hydroCHLOROthiazide (DIOVAN HCT) 160-12.5 MG per tablet Take 1 tablet by mouth daily 1/31/24  Yes Flex Ayoub MD   atorvastatin (LIPITOR) 10 MG tablet Take 1 tablet by mouth daily 1/31/24 7/29/24 Yes Flex Ayoub MD   pantoprazole (PROTONIX) 40 MG tablet Take 1 tablet by mouth every morning (before breakfast) 1/31/24  Yes Flex Ayoub MD   tadalafil (CIALIS) 20 MG tablet Take 2 tablets by mouth as needed for Erectile Dysfunction 1/31/24  Yes Flex Ayoub MD   metFORMIN (GLUCOPHAGE-XR) 500 MG extended release tablet Take 1 tablet by mouth 3 times daily (with meals) 1/31/24  Yes Flex Ayoub MD   ketoconazole (NIZORAL) 2 % shampoo Apply topically daily as needed. 7/19/23  Yes Flex Ayoub MD   Cholecalciferol (VITAMIN D3) 50 MCG (2000 UT) CAPS Take by mouth   Yes Israel Mccurdy MD   Omega-3 Fatty Acids (FISH OIL) 1000 MG CAPS Take 3 capsules by mouth daily   Yes Israel Mccurdy MD   vitamin B-1 (THIAMINE) 100 MG tablet Take 1 tablet by mouth daily   Yes Israel Mccurdy MD   ascorbic acid (VITAMIN C) 500 MG tablet Take 2 tablets by mouth daily   Yes Israel Mccurdy MD   Multiple Vitamins-Minerals (THERAPEUTIC MULTIVITAMIN-MINERALS) tablet Take 1 tablet by mouth daily   Yes Israel Mccurdy MD   Misc Natural Products (GLUCOSAMINE CHOND COMPLEX/MSM PO) Take 2 tablets by mouth daily   Yes Israel Mccurdy MD     Social History     Socioeconomic History

## 2024-01-31 NOTE — PATIENT INSTRUCTIONS
LOW SALT FOR BLOOD PRESSURE CONTROL.  LOW CARBOHYDRATE FOR BLOOD SUGAR AND WEIGHT CONTROL.  LOW FAT DIET FOR CHOLESTEROL CONTROL.  DRINK ENOUGH FLUIDS FOR BETTER KIDNEY FUNCTION.  TAKE VALSARTAN/-25 DAILY   FOR BLOOD PRESSURE CONTROL.  TAKE METFORMIN  MG. 3 TIMES A DAY  FOR BLOOD SUGAR CONTROL.  TAKE LIPITOR 10 MG. NIGHTLY   FOR CHOLESTEROL CONTROL..  TAKE  CIALIS 40 MG. AS NEEDED FOR ERECTION PROBLEM.  REGULAR WALKING ADVISED.  ADVISED WEIGHT REDUCTION.  SEE DR. SHIREEN Sanford  FOR PANCREATIC CYST EVALUATION.   FASTING FOR LAB WORK ONE MORNING.  NEXT APPOINTMENT IN 3 MONTHS.

## 2024-04-15 ENCOUNTER — TELEPHONE (OUTPATIENT)
Dept: FAMILY MEDICINE CLINIC | Age: 79
End: 2024-04-15

## 2024-04-15 NOTE — TELEPHONE ENCOUNTER
The pharmacist at [facility] called. He stated the Rx for the Cialis 20 mg stated take 2 tablets prn.  He stated the max dose is 20 mg.  Asking if ok to fill for the the Rx as written?  Please advise.  Last seen 1/31/2024  Next appt 6/5/2024  [facility]  [phone]

## 2024-04-16 RX ORDER — TADALAFIL 20 MG/1
20 TABLET ORAL PRN
Qty: 20 TABLET | Refills: 1 | Status: SHIPPED | OUTPATIENT
Start: 2024-04-16 | End: 2024-04-18 | Stop reason: SDUPTHER

## 2024-04-19 ENCOUNTER — TELEPHONE (OUTPATIENT)
Dept: HEMATOLOGY | Age: 79
End: 2024-04-19

## 2024-04-19 DIAGNOSIS — D49.0 IPMN (INTRADUCTAL PAPILLARY MUCINOUS NEOPLASM): Primary | ICD-10-CM

## 2024-04-19 RX ORDER — TADALAFIL 20 MG/1
20 TABLET ORAL PRN
Qty: 20 TABLET | Refills: 1 | Status: SHIPPED | OUTPATIENT
Start: 2024-04-19

## 2024-04-19 NOTE — TELEPHONE ENCOUNTER
MRI abdomen. No prior auth needed per Dimitri. Call Ref# [id number]. Alexander is scheduled at [place] for 5/15/24. Arrive at 845 for a 915 scan. NPO 4 hours prior. Labs need to be completed week prior to scan. Order in Epic. Called and spoke to patient who confirmed appt information and expressed understanding of instructions. A follow up appt was scheduled with Dr. Gamez.

## 2024-05-13 DIAGNOSIS — E78.2 MIXED HYPERCHOLESTEROLEMIA AND HYPERTRIGLYCERIDEMIA: ICD-10-CM

## 2024-05-13 DIAGNOSIS — I10 ESSENTIAL HYPERTENSION: Primary | ICD-10-CM

## 2024-05-13 DIAGNOSIS — I10 ESSENTIAL HYPERTENSION: ICD-10-CM

## 2024-05-13 LAB
ALBUMIN: 4.1 G/DL (ref 3.5–5.2)
ALP BLD-CCNC: 98 U/L (ref 40–129)
ALT SERPL-CCNC: 22 U/L (ref 0–40)
ANION GAP SERPL CALCULATED.3IONS-SCNC: 14 MMOL/L (ref 7–16)
AST SERPL-CCNC: 28 U/L (ref 0–39)
BILIRUB SERPL-MCNC: 0.6 MG/DL (ref 0–1.2)
BUN BLDV-MCNC: 24 MG/DL (ref 6–23)
CALCIUM SERPL-MCNC: 8.9 MG/DL (ref 8.6–10.2)
CHLORIDE BLD-SCNC: 102 MMOL/L (ref 98–107)
CHOLESTEROL, TOTAL: 118 MG/DL
CO2: 23 MMOL/L (ref 22–29)
CREAT SERPL-MCNC: 1.2 MG/DL (ref 0.7–1.2)
GFR, ESTIMATED: 59 ML/MIN/1.73M2
GLUCOSE BLD-MCNC: 140 MG/DL (ref 74–99)
HDLC SERPL-MCNC: 40 MG/DL
LDL CHOLESTEROL: 50 MG/DL
POTASSIUM SERPL-SCNC: 3.9 MMOL/L (ref 3.5–5)
SODIUM BLD-SCNC: 139 MMOL/L (ref 132–146)
TOTAL PROTEIN: 6.5 G/DL (ref 6.4–8.3)
TRIGL SERPL-MCNC: 141 MG/DL
VLDLC SERPL CALC-MCNC: 28 MG/DL

## 2024-05-14 NOTE — RESULT ENCOUNTER NOTE
KIDNEY FUNCTION BORDERLINE LOW. WILL MONITOR.   BLOOD SUGAR LEVEL ARE HIGH.  RECOMMEND:  LOW SALT, LOW CARB. AND LOW FAT DIET.  REGULAR EXERCISE.  CONTINUE CURRENT MEDICATIONS.   .DNC   PLEASE ACKNOWLEDGE RECEIPT OF INFORMATION BY REPLYING THE MESSAGE. THANKS.

## 2024-05-20 ENCOUNTER — OFFICE VISIT (OUTPATIENT)
Dept: SURGERY | Age: 79
End: 2024-05-20
Payer: COMMERCIAL

## 2024-05-20 VITALS
WEIGHT: 206.5 LBS | SYSTOLIC BLOOD PRESSURE: 155 MMHG | DIASTOLIC BLOOD PRESSURE: 71 MMHG | HEIGHT: 67 IN | BODY MASS INDEX: 32.41 KG/M2 | HEART RATE: 89 BPM

## 2024-05-20 DIAGNOSIS — D49.0 IPMN (INTRADUCTAL PAPILLARY MUCINOUS NEOPLASM): Primary | ICD-10-CM

## 2024-05-20 PROCEDURE — 3078F DIAST BP <80 MM HG: CPT | Performed by: CLINICAL NURSE SPECIALIST

## 2024-05-20 PROCEDURE — 1123F ACP DISCUSS/DSCN MKR DOCD: CPT | Performed by: CLINICAL NURSE SPECIALIST

## 2024-05-20 PROCEDURE — 3077F SYST BP >= 140 MM HG: CPT | Performed by: CLINICAL NURSE SPECIALIST

## 2024-05-20 PROCEDURE — 99213 OFFICE O/P EST LOW 20 MIN: CPT | Performed by: CLINICAL NURSE SPECIALIST

## 2024-05-20 NOTE — PROGRESS NOTES
Hepatobiliary and Pancreatic Surgery Progress Note    CC: pancreatic cyst    Subjective:   11/6/23: Mr. Rivera is a 79 yo M with PMH DM, GERD, HLD, HTN, Dm who presents as new aptient referral for pancreatic cyst. He had a CT a/p on 7/27/2023 to follow up a pancreatic tail cyst but has not seen HPB surgery before. The scan showed an 8.9 mm pancreatic body/tail cyst which has not changed in size since a CT scan in March of this year. He currently denies any symptoms of abdominal pain, nausea, vomiting, fevers, chills, poor PO intake, weight loss. He does not drink alcohol and does not smoke cugarettes. He has had no hx of pancreattiis or IV drug use.     11/20/23:  patient presents today for followup on imaging. He denies any new complaints. 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 CA 19-9 was 7.     5/20/2024: Pt states he is feeling good. Pt states he is on Metformin and he has formed to loose BMs up to 4 in a day but not every day, usually every 4-5 days. Denies abd pain, n/v, chills, or fever. He has lost 8# since last visit, states he is more active taking care of his son that just had a stroke. States he is eating well. He is concerned about being told he has CKD by MA prior to MRI, discussed his lab results with him, he states he has an upcoming appt with Dr Ayoub and he will discuss with him at that time. States he has seen Dr Pelayo and is trying to reach out to him also. He is not seeing nephrology. Instructed pt if he continues to lose weight he will call for appt sooner.      OBJECTIVE      Physical    BP (!) 155/71   Pulse 89   Ht 1.702 m (5' 7\")   Wt 93.7 kg (206 lb 8 oz)   BMI 32.34 kg/m²       General appearance: appears in no acute distress  Lungs:respiratory effort normal without accessory numbers  Heart: no pedal edema  Abdomen: soft, nondistended, nontympanic, no guarding, no peritoneal signs, normoactive bowel sounds  Extremities: ROM

## 2024-06-05 ENCOUNTER — OFFICE VISIT (OUTPATIENT)
Dept: FAMILY MEDICINE CLINIC | Age: 79
End: 2024-06-05
Payer: COMMERCIAL

## 2024-06-05 VITALS
DIASTOLIC BLOOD PRESSURE: 82 MMHG | OXYGEN SATURATION: 98 % | HEART RATE: 60 BPM | BODY MASS INDEX: 32.11 KG/M2 | WEIGHT: 205 LBS | SYSTOLIC BLOOD PRESSURE: 136 MMHG

## 2024-06-05 DIAGNOSIS — E11.22 CKD STAGE 3 SECONDARY TO DIABETES (HCC): ICD-10-CM

## 2024-06-05 DIAGNOSIS — N18.30 CKD STAGE 3 SECONDARY TO DIABETES (HCC): ICD-10-CM

## 2024-06-05 DIAGNOSIS — I10 ESSENTIAL HYPERTENSION: ICD-10-CM

## 2024-06-05 DIAGNOSIS — E78.2 MIXED HYPERCHOLESTEROLEMIA AND HYPERTRIGLYCERIDEMIA: ICD-10-CM

## 2024-06-05 DIAGNOSIS — E55.9 HYPOVITAMINOSIS D: ICD-10-CM

## 2024-06-05 DIAGNOSIS — E11.9 TYPE 2 DIABETES MELLITUS WITHOUT COMPLICATION, WITHOUT LONG-TERM CURRENT USE OF INSULIN (HCC): Primary | ICD-10-CM

## 2024-06-05 DIAGNOSIS — L29.8 PRURITIC DERMATOSIS OF SCALP: ICD-10-CM

## 2024-06-05 LAB — HBA1C MFR BLD: 6.2 %

## 2024-06-05 PROCEDURE — 83036 HEMOGLOBIN GLYCOSYLATED A1C: CPT | Performed by: FAMILY MEDICINE

## 2024-06-05 PROCEDURE — 3044F HG A1C LEVEL LT 7.0%: CPT | Performed by: FAMILY MEDICINE

## 2024-06-05 PROCEDURE — 1123F ACP DISCUSS/DSCN MKR DOCD: CPT | Performed by: FAMILY MEDICINE

## 2024-06-05 PROCEDURE — 99214 OFFICE O/P EST MOD 30 MIN: CPT | Performed by: FAMILY MEDICINE

## 2024-06-05 PROCEDURE — 3075F SYST BP GE 130 - 139MM HG: CPT | Performed by: FAMILY MEDICINE

## 2024-06-05 PROCEDURE — 3079F DIAST BP 80-89 MM HG: CPT | Performed by: FAMILY MEDICINE

## 2024-06-05 RX ORDER — VALSARTAN AND HYDROCHLOROTHIAZIDE 160; 12.5 MG/1; MG/1
1 TABLET, FILM COATED ORAL DAILY
Qty: 90 TABLET | Refills: 0 | Status: SHIPPED | OUTPATIENT
Start: 2024-06-05

## 2024-06-05 RX ORDER — PANTOPRAZOLE SODIUM 40 MG/1
40 TABLET, DELAYED RELEASE ORAL
Qty: 90 TABLET | Refills: 1 | Status: SHIPPED | OUTPATIENT
Start: 2024-06-05

## 2024-06-05 RX ORDER — ATORVASTATIN CALCIUM 10 MG/1
10 TABLET, FILM COATED ORAL DAILY
Qty: 90 TABLET | Refills: 1 | Status: SHIPPED | OUTPATIENT
Start: 2024-06-05 | End: 2024-12-02

## 2024-06-05 RX ORDER — TADALAFIL 20 MG/1
20 TABLET ORAL PRN
Qty: 20 TABLET | Refills: 1 | Status: SHIPPED | OUTPATIENT
Start: 2024-06-05

## 2024-06-05 RX ORDER — METFORMIN HYDROCHLORIDE 500 MG/1
500 TABLET, EXTENDED RELEASE ORAL
Qty: 270 TABLET | Refills: 1 | Status: SHIPPED | OUTPATIENT
Start: 2024-06-05

## 2024-06-05 SDOH — ECONOMIC STABILITY: FOOD INSECURITY: WITHIN THE PAST 12 MONTHS, YOU WORRIED THAT YOUR FOOD WOULD RUN OUT BEFORE YOU GOT MONEY TO BUY MORE.: NEVER TRUE

## 2024-06-05 SDOH — ECONOMIC STABILITY: FOOD INSECURITY: WITHIN THE PAST 12 MONTHS, THE FOOD YOU BOUGHT JUST DIDN'T LAST AND YOU DIDN'T HAVE MONEY TO GET MORE.: NEVER TRUE

## 2024-06-05 SDOH — ECONOMIC STABILITY: INCOME INSECURITY: HOW HARD IS IT FOR YOU TO PAY FOR THE VERY BASICS LIKE FOOD, HOUSING, MEDICAL CARE, AND HEATING?: NOT HARD AT ALL

## 2024-06-05 NOTE — PATIENT INSTRUCTIONS
LOW SALT FOR BLOOD PRESSURE CONTROL.  LOW CARBOHYDRATE FOR BLOOD SUGAR AND WEIGHT CONTROL.  LOW FAT DIET FOR CHOLESTEROL CONTROL.  DRINK ENOUGH FLUIDS FOR BETTER KIDNEY FUNCTION.  TAKE VALSARTAN/-25 DAILY   FOR BLOOD PRESSURE CONTROL.  TAKE METFORMIN  MG. 3 TIMES A DAY  FOR BLOOD SUGAR CONTROL.  TAKE LIPITOR 10 MG. NIGHTLY   FOR CHOLESTEROL CONTROL..  TAKE  CIALIS 40 MG. AS NEEDED FOR ERECTION PROBLEM.  REGULAR WALKING ADVISED.  ADVISED WEIGHT REDUCTION.  FOLLOW UP WITH  DR. SHIREEN Sanford  FOR PANCREATIC CYST EVALUATION.   SEE DERMATOLOGIST AS SCHEDULED.  FASTING FOR LAB WORK PRIOR TO NEXT VISIT.  NEXT APPOINTMENT IN 3 MONTHS.

## 2024-06-05 NOTE — PROGRESS NOTES
OFFICE PROGRESS NOTE      SUBJECTIVE:        Patient ID:   Alexander Rivera is a 78 y.o. male who presents for   Chief Complaint   Patient presents with    Referral - General     Dermatology            HPI:     RECHECK CKD, BP, CHOLESTEROL AND DIABETES.  SCALP AREA STILL VERY ITCHY AND NOT GETTING ANY BETTER.. REQUEST DERMATOLOGY REFERRAL.  MEDICATION REFILL.  FEELS GOOD.   WATCHING DIET GOOD.  WALKING SOME FOR EXERCISE.  TAKING MEDICATIONS REGULARLY.         Prior to Admission medications    Medication Sig Start Date End Date Taking? Authorizing Provider   valsartan-hydroCHLOROthiazide (DIOVAN HCT) 160-12.5 MG per tablet Take 1 tablet by mouth daily 6/5/24  Yes Flex Ayoub MD   atorvastatin (LIPITOR) 10 MG tablet Take 1 tablet by mouth daily 6/5/24 12/2/24 Yes Flex Ayoub MD   tadalafil (CIALIS) 20 MG tablet Take 1 tablet by mouth as needed for Erectile Dysfunction 6/5/24  Yes Flex Ayoub MD   pantoprazole (PROTONIX) 40 MG tablet Take 1 tablet by mouth every morning (before breakfast) 6/5/24  Yes Flex Ayoub MD   metFORMIN (GLUCOPHAGE-XR) 500 MG extended release tablet Take 1 tablet by mouth 3 times daily (with meals) 6/5/24  Yes Flex Ayoub MD   ketoconazole (NIZORAL) 2 % shampoo Apply topically daily as needed. 7/19/23  Yes Flex Ayoub MD   Cholecalciferol (VITAMIN D3) 50 MCG (2000 UT) CAPS Take by mouth   Yes Israel Mccurdy MD   Omega-3 Fatty Acids (FISH OIL) 1000 MG CAPS Take 3 capsules by mouth daily   Yes Israel Mccurdy MD   vitamin B-1 (THIAMINE) 100 MG tablet Take 1 tablet by mouth daily   Yes Israel Mccurdy MD   ascorbic acid (VITAMIN C) 500 MG tablet Take 2 tablets by mouth daily   Yes Israel Mccurdy MD   Multiple Vitamins-Minerals (THERAPEUTIC MULTIVITAMIN-MINERALS) tablet Take 1 tablet by mouth daily   Yes Israel Mccurdy MD   Misc Natural Products (GLUCOSAMINE CHOND COMPLEX/MSM PO) Take 2 tablets by mouth

## 2024-09-03 DIAGNOSIS — E55.9 HYPOVITAMINOSIS D: ICD-10-CM

## 2024-09-03 DIAGNOSIS — E78.2 MIXED HYPERCHOLESTEROLEMIA AND HYPERTRIGLYCERIDEMIA: ICD-10-CM

## 2024-09-03 DIAGNOSIS — E11.9 TYPE 2 DIABETES MELLITUS WITHOUT COMPLICATION, WITHOUT LONG-TERM CURRENT USE OF INSULIN (HCC): ICD-10-CM

## 2024-09-03 LAB
ALBUMIN: 4.3 G/DL (ref 3.5–5.2)
ALP BLD-CCNC: 101 U/L (ref 40–129)
ALT SERPL-CCNC: 18 U/L (ref 0–40)
ANION GAP SERPL CALCULATED.3IONS-SCNC: 11 MMOL/L (ref 7–16)
AST SERPL-CCNC: 26 U/L (ref 0–39)
BILIRUB SERPL-MCNC: 0.7 MG/DL (ref 0–1.2)
BUN BLDV-MCNC: 30 MG/DL (ref 6–23)
CALCIUM SERPL-MCNC: 9.4 MG/DL (ref 8.6–10.2)
CHLORIDE BLD-SCNC: 104 MMOL/L (ref 98–107)
CHOLESTEROL, TOTAL: 153 MG/DL
CO2: 29 MMOL/L (ref 22–29)
CREAT SERPL-MCNC: 1.3 MG/DL (ref 0.7–1.2)
GFR, ESTIMATED: 55 ML/MIN/1.73M2
GLUCOSE BLD-MCNC: 146 MG/DL (ref 74–99)
HBA1C MFR BLD: 6.7 % (ref 4–5.6)
HDLC SERPL-MCNC: 43 MG/DL
LDL CHOLESTEROL: 91 MG/DL
POTASSIUM SERPL-SCNC: 4.7 MMOL/L (ref 3.5–5)
SODIUM BLD-SCNC: 144 MMOL/L (ref 132–146)
TOTAL PROTEIN: 6.7 G/DL (ref 6.4–8.3)
TRIGL SERPL-MCNC: 96 MG/DL
VITAMIN D 25-HYDROXY: 24.1 NG/ML (ref 30–100)
VLDLC SERPL CALC-MCNC: 19 MG/DL

## 2024-09-11 ENCOUNTER — OFFICE VISIT (OUTPATIENT)
Dept: FAMILY MEDICINE CLINIC | Age: 79
End: 2024-09-11
Payer: COMMERCIAL

## 2024-09-11 VITALS
DIASTOLIC BLOOD PRESSURE: 78 MMHG | HEIGHT: 67 IN | WEIGHT: 206 LBS | OXYGEN SATURATION: 98 % | HEART RATE: 82 BPM | SYSTOLIC BLOOD PRESSURE: 124 MMHG | BODY MASS INDEX: 32.33 KG/M2

## 2024-09-11 DIAGNOSIS — N18.30 CKD STAGE 3 SECONDARY TO DIABETES (HCC): ICD-10-CM

## 2024-09-11 DIAGNOSIS — E11.22 CKD STAGE 3 SECONDARY TO DIABETES (HCC): ICD-10-CM

## 2024-09-11 DIAGNOSIS — E11.9 TYPE 2 DIABETES MELLITUS WITHOUT COMPLICATION, WITHOUT LONG-TERM CURRENT USE OF INSULIN (HCC): Primary | ICD-10-CM

## 2024-09-11 DIAGNOSIS — I10 ESSENTIAL HYPERTENSION: ICD-10-CM

## 2024-09-11 DIAGNOSIS — N52.9 ERECTILE DYSFUNCTION, UNSPECIFIED ERECTILE DYSFUNCTION TYPE: ICD-10-CM

## 2024-09-11 DIAGNOSIS — E55.9 HYPOVITAMINOSIS D: ICD-10-CM

## 2024-09-11 DIAGNOSIS — E78.2 MIXED HYPERCHOLESTEROLEMIA AND HYPERTRIGLYCERIDEMIA: ICD-10-CM

## 2024-09-11 PROCEDURE — 3074F SYST BP LT 130 MM HG: CPT | Performed by: FAMILY MEDICINE

## 2024-09-11 PROCEDURE — 3078F DIAST BP <80 MM HG: CPT | Performed by: FAMILY MEDICINE

## 2024-09-11 PROCEDURE — 3044F HG A1C LEVEL LT 7.0%: CPT | Performed by: FAMILY MEDICINE

## 2024-09-11 PROCEDURE — 1123F ACP DISCUSS/DSCN MKR DOCD: CPT | Performed by: FAMILY MEDICINE

## 2024-09-11 PROCEDURE — 99214 OFFICE O/P EST MOD 30 MIN: CPT | Performed by: FAMILY MEDICINE

## 2024-09-11 RX ORDER — ATORVASTATIN CALCIUM 10 MG/1
10 TABLET, FILM COATED ORAL DAILY
Qty: 90 TABLET | Refills: 1 | Status: SHIPPED | OUTPATIENT
Start: 2024-09-11 | End: 2025-03-10

## 2024-09-11 RX ORDER — PANTOPRAZOLE SODIUM 40 MG/1
40 TABLET, DELAYED RELEASE ORAL
Qty: 90 TABLET | Refills: 1 | Status: SHIPPED | OUTPATIENT
Start: 2024-09-11

## 2024-09-11 RX ORDER — ERGOCALCIFEROL 1.25 MG/1
50000 CAPSULE, LIQUID FILLED ORAL WEEKLY
Qty: 12 CAPSULE | Refills: 1 | Status: SHIPPED | OUTPATIENT
Start: 2024-09-11

## 2024-09-11 RX ORDER — VALSARTAN AND HYDROCHLOROTHIAZIDE 160; 12.5 MG/1; MG/1
1 TABLET, FILM COATED ORAL DAILY
Qty: 90 TABLET | Refills: 0 | Status: SHIPPED | OUTPATIENT
Start: 2024-09-11

## 2024-09-11 RX ORDER — METFORMIN HCL 500 MG
500 TABLET, EXTENDED RELEASE 24 HR ORAL
Qty: 270 TABLET | Refills: 1 | Status: SHIPPED | OUTPATIENT
Start: 2024-09-11

## 2024-09-11 RX ORDER — TADALAFIL 20 MG/1
20 TABLET ORAL PRN
Qty: 20 TABLET | Refills: 1 | Status: SHIPPED | OUTPATIENT
Start: 2024-09-11

## 2024-10-25 ENCOUNTER — TELEPHONE (OUTPATIENT)
Dept: FAMILY MEDICINE CLINIC | Age: 79
End: 2024-10-25

## 2024-10-25 NOTE — TELEPHONE ENCOUNTER
REST  FORCE FLUID ORALLY.  TYLENOL AS NEEDED.  TAKE MUCINEX 1 CAPSULE 2 TIMES A DAY FOR COUGH RELIEF.  CALL PATIENT FOR APPOINTMENT NEXT WEEK.       Spoke to patient   "Chief Complaint  Anxiety and Depression    Subjective            Cristine Riggins presents to [facility]  Pt is a 1 mo f/u for depression and anxiety. Pt was started on Buspar in addition to the Lexapro. Pt feels the Lexapro is working for her depression. Pt feels the Buspar is not working that much. Wonders if a dose increae would help.         Past Medical History:   Diagnosis Date   • Anxiety    • Depression        No Known Allergies     Past Surgical History:   Procedure Laterality Date   • CHOLECYSTECTOMY     • FOOT SURGERY  10/2018   • GALLBLADDER SURGERY  2015        Social History     Tobacco Use   • Smoking status: Never Smoker   • Smokeless tobacco: Never Used   Substance Use Topics   • Alcohol use: Never       Family History   Problem Relation Age of Onset   • Hypertension Mother    • Diabetes Father    • Hypertension Father    • Coronary artery disease Paternal Grandmother    • Heart disease Other    • Hyperlipidemia Other         Current Outpatient Medications on File Prior to Visit   Medication Sig   • escitalopram (Lexapro) 20 MG tablet Take 1 po q day   • [DISCONTINUED] busPIRone (BUSPAR) 7.5 MG tablet Take 1 tablet by mouth 2 (Two) Times a Day As Needed (anxiety).     No current facility-administered medications on file prior to visit.       Health Maintenance Due   Topic Date Due   • ANNUAL PHYSICAL  Never done   • HPV VACCINES (1 - 2-dose series) Never done   • COVID-19 Vaccine (3 - Booster for Pfizer series) 10/05/2021       Objective     /67   Pulse 84   Ht 182.9 cm (72\")   Wt 112 kg (247 lb)   SpO2 99%   BMI 33.50 kg/m²       Physical Exam  Constitutional:       General: She is not in acute distress.     Appearance: Normal appearance. She is not ill-appearing.   HENT:      Head: Normocephalic and atraumatic.      Mouth/Throat:      Pharynx: No posterior oropharyngeal erythema.   Cardiovascular:      Rate and Rhythm: Normal rate and regular rhythm. "      Heart sounds: Normal heart sounds. No murmur heard.  Pulmonary:      Effort: Pulmonary effort is normal. No respiratory distress.      Breath sounds: Normal breath sounds.   Chest:      Chest wall: No tenderness.   Abdominal:      General: There is no distension.      Palpations: There is no mass.      Tenderness: There is no abdominal tenderness. There is no guarding.   Musculoskeletal:         General: No swelling or tenderness. Normal range of motion.      Cervical back: Normal range of motion and neck supple.   Skin:     General: Skin is warm and dry.      Findings: No rash.   Neurological:      General: No focal deficit present.      Mental Status: She is alert and oriented to person, place, and time. Mental status is at baseline.      Gait: Gait normal.   Psychiatric:         Attention and Perception: Attention normal.         Mood and Affect: Mood and affect normal.         Speech: Speech normal.         Behavior: Behavior normal. Behavior is cooperative.         Thought Content: Thought content normal. Thought content does not include suicidal ideation.         Judgment: Judgment normal.           Result Review :                           Assessment and Plan        Diagnoses and all orders for this visit:    1. Depression, unspecified depression type (Primary)  Comments:  stable on lexapro 20mg, continue    2. Anxiety  -     busPIRone (BUSPAR) 15 MG tablet; Take 1 tablet by mouth 3 (Three) Times a Day.  Dispense: 90 tablet; Refill: 1              Follow Up     Return in about 6 months (around 2/22/2023).    Patient was given instructions and counseling regarding her condition or for health maintenance advice. Please see specific information pulled into the AVS if appropriate.            REFUGIO Kaminski

## 2024-10-25 NOTE — TELEPHONE ENCOUNTER
Patient called stating he has been sick for one week. He is coughing up green phlegm.  He was asking if something can be called in for him.

## 2024-12-05 NOTE — TELEPHONE ENCOUNTER
Name of Medication(s) Requested:  Requested Prescriptions     Pending Prescriptions Disp Refills    valsartan-hydroCHLOROthiazide (DIOVAN-HCT) 160-12.5 MG per tablet [Pharmacy Med Name: VALSARTAN-HYDROCHLORO 160-12.5 TABS] 90 tablet 0     Sig: TAKE 1 TABLET BY MOUTH DAILY       Medication is on current medication list Yes    Dosage and directions were verified? Yes    Quantity verified: 90 day supply     Pharmacy Verified?  Yes    Last Appointment:  9/11/2024    Future appts:  Future Appointments   Date Time Provider Department Center   1/8/2025 10:15 AM Flex Ayoub MD [facility]        (If no appt send self scheduling link. .REFILLAPPT)  Scheduling request sent?     [] Yes  [x] No    Does patient need updated?  [] Yes  [x] No

## 2024-12-06 RX ORDER — VALSARTAN AND HYDROCHLOROTHIAZIDE 160; 12.5 MG/1; MG/1
1 TABLET, FILM COATED ORAL DAILY
Qty: 90 TABLET | Refills: 0 | Status: SHIPPED | OUTPATIENT
Start: 2024-12-06

## 2025-01-08 ENCOUNTER — OFFICE VISIT (OUTPATIENT)
Dept: FAMILY MEDICINE CLINIC | Age: 80
End: 2025-01-08
Payer: MEDICARE

## 2025-01-08 VITALS
OXYGEN SATURATION: 98 % | HEART RATE: 78 BPM | BODY MASS INDEX: 32.42 KG/M2 | DIASTOLIC BLOOD PRESSURE: 80 MMHG | SYSTOLIC BLOOD PRESSURE: 130 MMHG | WEIGHT: 207 LBS

## 2025-01-08 DIAGNOSIS — I10 ESSENTIAL HYPERTENSION: ICD-10-CM

## 2025-01-08 DIAGNOSIS — E11.9 TYPE 2 DIABETES MELLITUS WITHOUT COMPLICATION, WITHOUT LONG-TERM CURRENT USE OF INSULIN (HCC): Primary | ICD-10-CM

## 2025-01-08 DIAGNOSIS — E11.22 CKD STAGE 3 SECONDARY TO DIABETES (HCC): ICD-10-CM

## 2025-01-08 DIAGNOSIS — E55.9 HYPOVITAMINOSIS D: ICD-10-CM

## 2025-01-08 DIAGNOSIS — E78.2 MIXED HYPERCHOLESTEROLEMIA AND HYPERTRIGLYCERIDEMIA: ICD-10-CM

## 2025-01-08 DIAGNOSIS — N52.9 ERECTILE DYSFUNCTION, UNSPECIFIED ERECTILE DYSFUNCTION TYPE: ICD-10-CM

## 2025-01-08 DIAGNOSIS — N18.30 CKD STAGE 3 SECONDARY TO DIABETES (HCC): ICD-10-CM

## 2025-01-08 LAB — HBA1C MFR BLD: 7 %

## 2025-01-08 PROCEDURE — 3079F DIAST BP 80-89 MM HG: CPT | Performed by: FAMILY MEDICINE

## 2025-01-08 PROCEDURE — 83036 HEMOGLOBIN GLYCOSYLATED A1C: CPT | Performed by: FAMILY MEDICINE

## 2025-01-08 PROCEDURE — 1159F MED LIST DOCD IN RCRD: CPT | Performed by: FAMILY MEDICINE

## 2025-01-08 PROCEDURE — 99214 OFFICE O/P EST MOD 30 MIN: CPT | Performed by: FAMILY MEDICINE

## 2025-01-08 PROCEDURE — 3051F HG A1C>EQUAL 7.0%<8.0%: CPT | Performed by: FAMILY MEDICINE

## 2025-01-08 PROCEDURE — 1160F RVW MEDS BY RX/DR IN RCRD: CPT | Performed by: FAMILY MEDICINE

## 2025-01-08 PROCEDURE — 3075F SYST BP GE 130 - 139MM HG: CPT | Performed by: FAMILY MEDICINE

## 2025-01-08 PROCEDURE — 1123F ACP DISCUSS/DSCN MKR DOCD: CPT | Performed by: FAMILY MEDICINE

## 2025-01-08 RX ORDER — METFORMIN HYDROCHLORIDE 500 MG/1
500 TABLET, EXTENDED RELEASE ORAL
Qty: 270 TABLET | Refills: 1 | Status: SHIPPED | OUTPATIENT
Start: 2025-01-08

## 2025-01-08 RX ORDER — PANTOPRAZOLE SODIUM 40 MG/1
40 TABLET, DELAYED RELEASE ORAL
Qty: 90 TABLET | Refills: 1 | Status: SHIPPED | OUTPATIENT
Start: 2025-01-08

## 2025-01-08 RX ORDER — FLUOCINONIDE TOPICAL SOLUTION USP, 0.05% 0.5 MG/ML
SOLUTION TOPICAL
Qty: 60 ML | Refills: 1 | Status: SHIPPED | OUTPATIENT
Start: 2025-01-08

## 2025-01-08 RX ORDER — TADALAFIL 20 MG/1
20 TABLET ORAL PRN
Qty: 20 TABLET | Refills: 1 | Status: SHIPPED | OUTPATIENT
Start: 2025-01-08

## 2025-01-08 RX ORDER — ATORVASTATIN CALCIUM 10 MG/1
10 TABLET, FILM COATED ORAL DAILY
Qty: 90 TABLET | Refills: 1 | Status: SHIPPED | OUTPATIENT
Start: 2025-01-08 | End: 2025-07-07

## 2025-01-08 RX ORDER — ERGOCALCIFEROL 1.25 MG/1
50000 CAPSULE, LIQUID FILLED ORAL WEEKLY
Qty: 12 CAPSULE | Refills: 1 | Status: SHIPPED | OUTPATIENT
Start: 2025-01-08

## 2025-01-08 RX ORDER — FLUOCINONIDE TOPICAL SOLUTION USP, 0.05% 0.5 MG/ML
SOLUTION TOPICAL
COMMUNITY
Start: 2024-11-13 | End: 2025-01-08 | Stop reason: SDUPTHER

## 2025-01-08 RX ORDER — VALSARTAN AND HYDROCHLOROTHIAZIDE 160; 12.5 MG/1; MG/1
1 TABLET, FILM COATED ORAL DAILY
Qty: 90 TABLET | Refills: 0 | Status: SHIPPED | OUTPATIENT
Start: 2025-01-08

## 2025-01-08 SDOH — ECONOMIC STABILITY: FOOD INSECURITY: WITHIN THE PAST 12 MONTHS, YOU WORRIED THAT YOUR FOOD WOULD RUN OUT BEFORE YOU GOT MONEY TO BUY MORE.: NEVER TRUE

## 2025-01-08 SDOH — ECONOMIC STABILITY: FOOD INSECURITY: WITHIN THE PAST 12 MONTHS, THE FOOD YOU BOUGHT JUST DIDN'T LAST AND YOU DIDN'T HAVE MONEY TO GET MORE.: NEVER TRUE

## 2025-01-08 ASSESSMENT — PATIENT HEALTH QUESTIONNAIRE - PHQ9
SUM OF ALL RESPONSES TO PHQ QUESTIONS 1-9: 0
1. LITTLE INTEREST OR PLEASURE IN DOING THINGS: NOT AT ALL
SUM OF ALL RESPONSES TO PHQ QUESTIONS 1-9: 0
SUM OF ALL RESPONSES TO PHQ QUESTIONS 1-9: 0
SUM OF ALL RESPONSES TO PHQ9 QUESTIONS 1 & 2: 0
2. FEELING DOWN, DEPRESSED OR HOPELESS: NOT AT ALL
SUM OF ALL RESPONSES TO PHQ QUESTIONS 1-9: 0

## 2025-01-08 NOTE — PROGRESS NOTES
OFFICE PROGRESS NOTE      SUBJECTIVE:        Patient ID:   Alexander Rivera is a 79 y.o. male who presents for   Chief Complaint   Patient presents with    Fall     Left side  sore rib area           HPI:     RECHECK BP, CHOLESTEROL AND DIABETES.  H/O FALL ABOUT 1 WEEK AGO. INJURY TO THE LEFT SIDE OF LOWER CHEST. PAIN WHEN COUGHING BUT GETTING BETTER NOW. WILL TRY HEATING PAD 15 MINUTE 2 TIMES A DAY AS RECOMMENDED.  MEDICATION REFILL.  WATCHING DIET BUT NOT GOOD.  WALKING FOR EXERCISE.  TAKING MEDICATIONS REGULARLY.         Prior to Admission medications    Medication Sig Start Date End Date Taking? Authorizing Provider   valsartan-hydroCHLOROthiazide (DIOVAN-HCT) 160-12.5 MG per tablet Take 1 tablet by mouth daily 1/8/25  Yes Flex Ayoub MD   atorvastatin (LIPITOR) 10 MG tablet Take 1 tablet by mouth daily 1/8/25 7/7/25 Yes Flex Ayoub MD   metFORMIN (GLUCOPHAGE-XR) 500 MG extended release tablet Take 1 tablet by mouth 3 times daily (with meals) 1/8/25  Yes Flex Ayoub MD   vitamin D (ERGOCALCIFEROL) 1.25 MG (14005 UT) CAPS capsule Take 1 capsule by mouth once a week 1/8/25  Yes Flex Ayoub MD   pantoprazole (PROTONIX) 40 MG tablet Take 1 tablet by mouth every morning (before breakfast) 1/8/25  Yes Flex Ayoub MD   tadalafil (CIALIS) 20 MG tablet Take 1 tablet by mouth as needed for Erectile Dysfunction 1/8/25  Yes Flex Ayoub MD   fluocinonide (LIDEX) 0.05 % external solution Apply topically 2 times daily. 1/8/25  Yes Flex Ayoub MD   ketoconazole (NIZORAL) 2 % shampoo Apply topically daily as needed. 7/19/23  Yes Flex Ayoub MD   Cholecalciferol (VITAMIN D3) 50 MCG (2000 UT) CAPS Take by mouth   Yes Israel Mccurdy MD   Omega-3 Fatty Acids (FISH OIL) 1000 MG CAPS Take 3 capsules by mouth daily   Yes Israel Mccurdy MD   vitamin B-1 (THIAMINE) 100 MG tablet Take 1 tablet by mouth daily   Yes Israel Mccurdy MD   ascorbic

## 2025-01-08 NOTE — PATIENT INSTRUCTIONS
LOW SALT FOR BLOOD PRESSURE CONTROL.  LOW CARBOHYDRATE FOR BLOOD SUGAR AND WEIGHT CONTROL.  LOW FAT DIET FOR CHOLESTEROL CONTROL.  DRINK ENOUGH FLUIDS FOR BETTER KIDNEY FUNCTION.  TAKE VALSARTAN/-25 DAILY   FOR BLOOD PRESSURE CONTROL.  TAKE METFORMIN  MG. 3 TIMES A DAY  FOR BLOOD SUGAR CONTROL.  TAKE LIPITOR 10 MG. NIGHTLY   FOR CHOLESTEROL CONTROL..  TAKE  CIALIS 40 MG. AS NEEDED FOR ERECTION PROBLEM.  TAKE VITAMIN D 80847 UNITS WEEKLY FOR VITAMIN DEFICIENCY.  REGULAR WALKING ADVISED.  ADVISED WEIGHT REDUCTION.  FOLLOW UP WITH  DR. SHIREEN Sanford  FOR PANCREATIC CYST EVALUATION.   FASTING FOR LAB WORK ONE MORNING.  NEXT APPOINTMENT IN 3 MONTHS.

## 2025-04-11 DIAGNOSIS — E11.22 CKD STAGE 3 SECONDARY TO DIABETES (HCC): ICD-10-CM

## 2025-04-11 DIAGNOSIS — E55.9 HYPOVITAMINOSIS D: ICD-10-CM

## 2025-04-11 DIAGNOSIS — E78.2 MIXED HYPERCHOLESTEROLEMIA AND HYPERTRIGLYCERIDEMIA: ICD-10-CM

## 2025-04-11 DIAGNOSIS — N18.30 CKD STAGE 3 SECONDARY TO DIABETES (HCC): ICD-10-CM

## 2025-04-11 LAB
ALBUMIN: 4.3 G/DL (ref 3.5–5.2)
ALP BLD-CCNC: 113 U/L (ref 40–129)
ALT SERPL-CCNC: 19 U/L (ref 0–40)
ANION GAP SERPL CALCULATED.3IONS-SCNC: 14 MMOL/L (ref 7–16)
AST SERPL-CCNC: 26 U/L (ref 0–39)
BILIRUB SERPL-MCNC: 0.7 MG/DL (ref 0–1.2)
BUN BLDV-MCNC: 19 MG/DL (ref 6–23)
CALCIUM SERPL-MCNC: 8.9 MG/DL (ref 8.6–10.2)
CHLORIDE BLD-SCNC: 100 MMOL/L (ref 98–107)
CHOLESTEROL, TOTAL: 147 MG/DL
CO2: 27 MMOL/L (ref 22–29)
CREAT SERPL-MCNC: 1.3 MG/DL (ref 0.7–1.2)
CREATININE URINE: 131.3 MG/DL (ref 40–278)
GFR, ESTIMATED: 55 ML/MIN/1.73M2
GLUCOSE BLD-MCNC: 151 MG/DL (ref 74–99)
HDLC SERPL-MCNC: 40 MG/DL
LDL CHOLESTEROL: 77 MG/DL
MICROALBUMIN/CREAT 24H UR: 32 MG/L (ref 0–19)
MICROALBUMIN/CREAT UR-RTO: 24 MCG/MG CREAT (ref 0–30)
POTASSIUM SERPL-SCNC: 3.9 MMOL/L (ref 3.5–5)
SODIUM BLD-SCNC: 141 MMOL/L (ref 132–146)
TOTAL PROTEIN: 6.7 G/DL (ref 6.4–8.3)
TRIGL SERPL-MCNC: 148 MG/DL
VITAMIN D 25-HYDROXY: 40.3 NG/ML (ref 30–100)
VLDLC SERPL CALC-MCNC: 30 MG/DL

## 2025-04-15 ENCOUNTER — RESULTS FOLLOW-UP (OUTPATIENT)
Dept: FAMILY MEDICINE CLINIC | Age: 80
End: 2025-04-15

## 2025-04-15 NOTE — RESULT ENCOUNTER NOTE
KIDNEY FUNCTION BORDERLINE LOW. WILL MONITOR.  PLEASE ACKNOWLEDGE RECEIPT OF INFORMATION BY REPLYING THE MESSAGE. THANKS.

## 2025-04-16 ENCOUNTER — OFFICE VISIT (OUTPATIENT)
Dept: FAMILY MEDICINE CLINIC | Age: 80
End: 2025-04-16
Payer: MEDICARE

## 2025-04-16 VITALS
HEART RATE: 92 BPM | SYSTOLIC BLOOD PRESSURE: 130 MMHG | DIASTOLIC BLOOD PRESSURE: 70 MMHG | BODY MASS INDEX: 32.73 KG/M2 | WEIGHT: 209 LBS | OXYGEN SATURATION: 97 %

## 2025-04-16 DIAGNOSIS — I10 ESSENTIAL HYPERTENSION: ICD-10-CM

## 2025-04-16 DIAGNOSIS — E55.9 HYPOVITAMINOSIS D: ICD-10-CM

## 2025-04-16 DIAGNOSIS — E78.2 MIXED HYPERCHOLESTEROLEMIA AND HYPERTRIGLYCERIDEMIA: ICD-10-CM

## 2025-04-16 DIAGNOSIS — K86.2 PANCREATIC CYST: ICD-10-CM

## 2025-04-16 DIAGNOSIS — E11.9 TYPE 2 DIABETES MELLITUS WITHOUT COMPLICATION, WITHOUT LONG-TERM CURRENT USE OF INSULIN: Primary | ICD-10-CM

## 2025-04-16 DIAGNOSIS — N18.30 CKD STAGE 3 SECONDARY TO DIABETES (HCC): ICD-10-CM

## 2025-04-16 DIAGNOSIS — E11.22 CKD STAGE 3 SECONDARY TO DIABETES (HCC): ICD-10-CM

## 2025-04-16 DIAGNOSIS — N52.9 ERECTILE DYSFUNCTION, UNSPECIFIED ERECTILE DYSFUNCTION TYPE: ICD-10-CM

## 2025-04-16 LAB — HBA1C MFR BLD: 7.3 %

## 2025-04-16 PROCEDURE — 1159F MED LIST DOCD IN RCRD: CPT | Performed by: FAMILY MEDICINE

## 2025-04-16 PROCEDURE — 3078F DIAST BP <80 MM HG: CPT | Performed by: FAMILY MEDICINE

## 2025-04-16 PROCEDURE — 3051F HG A1C>EQUAL 7.0%<8.0%: CPT | Performed by: FAMILY MEDICINE

## 2025-04-16 PROCEDURE — 99214 OFFICE O/P EST MOD 30 MIN: CPT | Performed by: FAMILY MEDICINE

## 2025-04-16 PROCEDURE — 3075F SYST BP GE 130 - 139MM HG: CPT | Performed by: FAMILY MEDICINE

## 2025-04-16 PROCEDURE — 1160F RVW MEDS BY RX/DR IN RCRD: CPT | Performed by: FAMILY MEDICINE

## 2025-04-16 PROCEDURE — 83036 HEMOGLOBIN GLYCOSYLATED A1C: CPT | Performed by: FAMILY MEDICINE

## 2025-04-16 PROCEDURE — 1123F ACP DISCUSS/DSCN MKR DOCD: CPT | Performed by: FAMILY MEDICINE

## 2025-04-16 RX ORDER — ATORVASTATIN CALCIUM 10 MG/1
10 TABLET, FILM COATED ORAL DAILY
Qty: 90 TABLET | Refills: 1 | Status: SHIPPED | OUTPATIENT
Start: 2025-04-16 | End: 2025-10-13

## 2025-04-16 RX ORDER — ERGOCALCIFEROL 1.25 MG/1
50000 CAPSULE, LIQUID FILLED ORAL WEEKLY
Qty: 12 CAPSULE | Refills: 1 | Status: SHIPPED | OUTPATIENT
Start: 2025-04-16

## 2025-04-16 RX ORDER — METFORMIN HYDROCHLORIDE 500 MG/1
500 TABLET, EXTENDED RELEASE ORAL
Qty: 270 TABLET | Refills: 1 | Status: SHIPPED | OUTPATIENT
Start: 2025-04-16

## 2025-04-16 RX ORDER — PANTOPRAZOLE SODIUM 40 MG/1
40 TABLET, DELAYED RELEASE ORAL
Qty: 90 TABLET | Refills: 1 | Status: SHIPPED | OUTPATIENT
Start: 2025-04-16

## 2025-04-16 RX ORDER — TADALAFIL 20 MG/1
20 TABLET ORAL PRN
Qty: 20 TABLET | Refills: 1 | Status: SHIPPED | OUTPATIENT
Start: 2025-04-16

## 2025-04-16 RX ORDER — VALSARTAN AND HYDROCHLOROTHIAZIDE 160; 12.5 MG/1; MG/1
1 TABLET, FILM COATED ORAL DAILY
Qty: 90 TABLET | Refills: 0 | Status: SHIPPED | OUTPATIENT
Start: 2025-04-16

## 2025-04-16 NOTE — PROGRESS NOTES
tenderness, swelling or warmth  Neuro:  No focal motor or sensory deficits        ASSESSMENT     Patient Active Problem List    Diagnosis Date Noted    Mixed hypercholesterolemia and hypertriglyceridemia 02/08/2021    Type 2 diabetes mellitus with chronic kidney disease 03/16/2023    Hypovitaminosis D 08/30/2022    Primary osteoarthritis of both knees 03/21/2022    CKD stage 3 secondary to diabetes ([facility]) 06/24/2021    Type 2 diabetes mellitus without complication, without long-term current use of insulin 02/08/2021    Essential hypertension 02/08/2021        Diagnosis:     ICD-10-CM    1. Type 2 diabetes mellitus without complication, without long-term current use of insulin  E11.9 POCT glycosylated hemoglobin (Hb A1C)    FAIR CONTROL      2. Mixed hypercholesterolemia and hypertriglyceridemia  E78.2     CONTROLLED      3. Essential hypertension  I10     CONTROLLED      4. CKD stage 3 secondary to diabetes ([facility])  E11.22     N18.30     STABLE      5. Hypovitaminosis D  E55.9     IMPROVED.      6. Pancreatic cyst  K86.2       7. Erectile dysfunction, unspecified erectile dysfunction type  N52.9 tadalafil (CIALIS) 20 MG tablet    STABLE          PLAN:           Patient Instructions   LOW SALT FOR BLOOD PRESSURE CONTROL.  LOW CARBOHYDRATE FOR BLOOD SUGAR AND WEIGHT CONTROL.  LOW FAT DIET FOR CHOLESTEROL CONTROL.  DRINK ENOUGH FLUIDS FOR BETTER KIDNEY FUNCTION.  TAKE VALSARTAN/-25 DAILY   FOR BLOOD PRESSURE CONTROL.  TAKE METFORMIN  MG. 3 TIMES A DAY  FOR BLOOD SUGAR CONTROL.  TAKE LIPITOR 10 MG. NIGHTLY   FOR CHOLESTEROL CONTROL..  TAKE  CIALIS 40 MG. AS NEEDED FOR ERECTION PROBLEM.  TAKE VITAMIN D 79826 UNITS WEEKLY FOR VITAMIN DEFICIENCY.  REGULAR WALKING ADVISED.  ADVISED WEIGHT REDUCTION.  FOLLOW UP WITH  DR. SHIREEN Sanford  FOR PANCREATIC CYST EVALUATION.   NEXT APPOINTMENT IN 2 MONTHS FOR ANNUAL PHYSICAL EXAMINATION.    Return in about 2 months (around 6/16/2025) for ANNUAL WELLNESS VISIT..

## 2025-04-16 NOTE — PATIENT INSTRUCTIONS
LOW SALT FOR BLOOD PRESSURE CONTROL.  LOW CARBOHYDRATE FOR BLOOD SUGAR AND WEIGHT CONTROL.  LOW FAT DIET FOR CHOLESTEROL CONTROL.  DRINK ENOUGH FLUIDS FOR BETTER KIDNEY FUNCTION.  TAKE VALSARTAN/-25 DAILY   FOR BLOOD PRESSURE CONTROL.  TAKE METFORMIN  MG. 3 TIMES A DAY  FOR BLOOD SUGAR CONTROL.  TAKE LIPITOR 10 MG. NIGHTLY   FOR CHOLESTEROL CONTROL..  TAKE  CIALIS 40 MG. AS NEEDED FOR ERECTION PROBLEM.  TAKE VITAMIN D 78188 UNITS WEEKLY FOR VITAMIN DEFICIENCY.  REGULAR WALKING ADVISED.  ADVISED WEIGHT REDUCTION.  FOLLOW UP WITH  DR. SHIREEN Sanford  FOR PANCREATIC CYST EVALUATION.   NEXT APPOINTMENT IN 2 MONTHS FOR ANNUAL PHYSICAL EXAMINATION.

## 2025-04-24 ENCOUNTER — TELEPHONE (OUTPATIENT)
Dept: HEMATOLOGY | Age: 80
End: 2025-04-24

## 2025-04-24 DIAGNOSIS — D49.0 IPMN (INTRADUCTAL PAPILLARY MUCINOUS NEOPLASM): Primary | ICD-10-CM

## 2025-04-24 NOTE — TELEPHONE ENCOUNTER
I scheduled patient for his MRI on 6/4/25 at 2:30pm at the [place] facility.  I instructed him to arrive by 2:00pm, NPO 6 hours prior to the MRI and wear no metal or jewelry.  He is aware he will need labs done prior to the MRI.  I made him a follow up appt on 6/16/25 at 9:00am at the [place] office.  He verbalized understanding and he confirmed these appts.    [place]:   [id number]  4/3/25-9/30/25    Electronically signed by Clara Billy RN on 4/24/2025 at 9:23 AM

## 2025-05-21 DIAGNOSIS — D49.0 IPMN (INTRADUCTAL PAPILLARY MUCINOUS NEOPLASM): ICD-10-CM

## 2025-05-21 LAB
ANION GAP SERPL CALCULATED.3IONS-SCNC: 11 MMOL/L (ref 7–16)
BUN BLDV-MCNC: 23 MG/DL (ref 8–23)
CALCIUM SERPL-MCNC: 8.7 MG/DL (ref 8.8–10.2)
CHLORIDE BLD-SCNC: 104 MMOL/L (ref 98–107)
CO2: 25 MMOL/L (ref 22–29)
CREAT SERPL-MCNC: 1.3 MG/DL (ref 0.7–1.2)
GFR, ESTIMATED: 56 ML/MIN/1.73M2
GLUCOSE BLD-MCNC: 149 MG/DL (ref 74–99)
POTASSIUM SERPL-SCNC: 4 MMOL/L (ref 3.5–5.1)
SODIUM BLD-SCNC: 139 MMOL/L (ref 136–145)

## 2025-05-23 DIAGNOSIS — D49.0 IPMN (INTRADUCTAL PAPILLARY MUCINOUS NEOPLASM): Primary | ICD-10-CM

## 2025-06-16 ENCOUNTER — OFFICE VISIT (OUTPATIENT)
Dept: SURGERY | Age: 80
End: 2025-06-16
Payer: MEDICARE

## 2025-06-16 VITALS
HEIGHT: 67 IN | WEIGHT: 209 LBS | BODY MASS INDEX: 32.8 KG/M2 | OXYGEN SATURATION: 96 % | SYSTOLIC BLOOD PRESSURE: 139 MMHG | DIASTOLIC BLOOD PRESSURE: 82 MMHG | HEART RATE: 90 BPM

## 2025-06-16 DIAGNOSIS — K86.2 CYSTIC MASS OF PANCREAS: ICD-10-CM

## 2025-06-16 DIAGNOSIS — D49.0 IPMN (INTRADUCTAL PAPILLARY MUCINOUS NEOPLASM): Primary | ICD-10-CM

## 2025-06-16 PROCEDURE — 1123F ACP DISCUSS/DSCN MKR DOCD: CPT | Performed by: STUDENT IN AN ORGANIZED HEALTH CARE EDUCATION/TRAINING PROGRAM

## 2025-06-16 PROCEDURE — 99213 OFFICE O/P EST LOW 20 MIN: CPT | Performed by: STUDENT IN AN ORGANIZED HEALTH CARE EDUCATION/TRAINING PROGRAM

## 2025-06-16 PROCEDURE — 3075F SYST BP GE 130 - 139MM HG: CPT | Performed by: STUDENT IN AN ORGANIZED HEALTH CARE EDUCATION/TRAINING PROGRAM

## 2025-06-16 PROCEDURE — 3079F DIAST BP 80-89 MM HG: CPT | Performed by: STUDENT IN AN ORGANIZED HEALTH CARE EDUCATION/TRAINING PROGRAM

## 2025-06-16 PROCEDURE — 1126F AMNT PAIN NOTED NONE PRSNT: CPT | Performed by: STUDENT IN AN ORGANIZED HEALTH CARE EDUCATION/TRAINING PROGRAM

## 2025-06-16 NOTE — PROGRESS NOTES
Hepatobiliary and Pancreatic Surgery Progress Note    CC: Pancreatic cyst follow up    Subjective:  11/6/23: Mr. Rivera is a 77 yo M with PMH DM, GERD, HLD, HTN, Dm who presents as new aptient referral for pancreatic cyst. He had a CT a/p on 7/27/2023 to follow up a pancreatic tail cyst but has not seen HPB surgery before. The scan showed an 8.9 mm pancreatic body/tail cyst which has not changed in size since a CT scan in March of this year. He currently denies any symptoms of abdominal pain, nausea, vomiting, fevers, chills, poor PO intake, weight loss. He does not drink alcohol and does not smoke cugarettes. He has had no hx of pancreattiis or IV drug use.       6/16/25: Patient presents for follow up with annual MRI. No new issues. Has diarrhea due to metformin.     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  - No changes from MRI in 2024, no suspicious features  - Continue annual surveillance  - Follow up in 1 yr with MRI    Thank you for the consultation and allowing me to take part in Mr. Rivera's care.    Greater than or equal to 20 minutes was spent providing face-to-face patient care, including:  and coordinating care, reviewing the chart, labs, and diagnostics, as well as medical decision making. Greater than 50% of this time was spent instructing and counseling the patient face to face regarding findings and recommendations.    Rosy Gamez MD  6/16/2025 8:51 AM

## 2025-07-18 RX ORDER — VALSARTAN AND HYDROCHLOROTHIAZIDE 160; 12.5 MG/1; MG/1
1 TABLET, FILM COATED ORAL DAILY
Qty: 90 TABLET | Refills: 0 | Status: SHIPPED | OUTPATIENT
Start: 2025-07-18

## 2025-07-18 NOTE — TELEPHONE ENCOUNTER
Name of Medication(s) Requested:  Requested Prescriptions     Pending Prescriptions Disp Refills    valsartan-hydroCHLOROthiazide (DIOVAN-HCT) 160-12.5 MG per tablet [Pharmacy Med Name: Valsartan-hydroCHLOROthiazide Oral Tablet 160-12.5 MG] 90 tablet 0     Sig: TAKE ONE TABLET BY MOUTH DAILY       Medication is on current medication list Yes    Dosage and directions were verified? Yes    Quantity verified: 90 day supply     Pharmacy Verified?  Yes    Last Appointment:  4/16/2025    Future appts:  Future Appointments   Date Time Provider Department Center   7/23/2025 10:30 AM Flex Ayoub MD [facility] DEP        (If no appt send self scheduling link. .REFILLAPPT)  Scheduling request sent?     [] Yes  [x] No    Does patient need updated?  [] Yes  [x] No

## 2025-07-22 ENCOUNTER — APPOINTMENT (OUTPATIENT)
Dept: GENERAL RADIOLOGY | Age: 80
End: 2025-07-22
Payer: MEDICARE

## 2025-07-22 ENCOUNTER — HOSPITAL ENCOUNTER (EMERGENCY)
Age: 80
Discharge: ANOTHER HOSPITAL | End: 2025-07-22
Attending: EMERGENCY MEDICINE
Payer: MEDICARE

## 2025-07-22 ENCOUNTER — HOSPITAL ENCOUNTER (INPATIENT)
Age: 80
LOS: 3 days | Discharge: HOME | End: 2025-07-25
Attending: INTERNAL MEDICINE | Admitting: INTERNAL MEDICINE
Payer: MEDICARE

## 2025-07-22 VITALS
HEART RATE: 110 BPM | TEMPERATURE: 98.7 F | RESPIRATION RATE: 29 BRPM | DIASTOLIC BLOOD PRESSURE: 60 MMHG | OXYGEN SATURATION: 93 % | BODY MASS INDEX: 32.02 KG/M2 | HEIGHT: 67 IN | SYSTOLIC BLOOD PRESSURE: 116 MMHG | WEIGHT: 204 LBS

## 2025-07-22 DIAGNOSIS — I48.19 PERSISTENT ATRIAL FIBRILLATION (HCC): ICD-10-CM

## 2025-07-22 DIAGNOSIS — R94.31 ABNORMAL ELECTROCARDIOGRAPHY: ICD-10-CM

## 2025-07-22 DIAGNOSIS — R07.9 CHEST PAIN, UNSPECIFIED TYPE: ICD-10-CM

## 2025-07-22 DIAGNOSIS — I48.92 ATRIAL FLUTTER WITH RAPID VENTRICULAR RESPONSE (HCC): Primary | ICD-10-CM

## 2025-07-22 DIAGNOSIS — I48.92 ATRIAL FLUTTER, UNSPECIFIED TYPE (HCC): Primary | ICD-10-CM

## 2025-07-22 DIAGNOSIS — R06.02 SHORTNESS OF BREATH: ICD-10-CM

## 2025-07-22 DIAGNOSIS — I20.0 UNSTABLE ANGINA PECTORIS (HCC): ICD-10-CM

## 2025-07-22 LAB
ALBUMIN SERPL-MCNC: 4 G/DL (ref 3.5–5.2)
ALP SERPL-CCNC: 115 U/L (ref 40–129)
ALT SERPL-CCNC: 19 U/L (ref 0–50)
ANION GAP SERPL CALCULATED.3IONS-SCNC: 12 MMOL/L (ref 7–16)
AST SERPL-CCNC: 25 U/L (ref 0–50)
BASOPHILS # BLD: 0.04 K/UL (ref 0–0.2)
BASOPHILS NFR BLD: 0 % (ref 0–2)
BILIRUB SERPL-MCNC: 0.5 MG/DL (ref 0–1.2)
BNP SERPL-MCNC: 1555 PG/ML (ref 0–450)
BUN SERPL-MCNC: 29 MG/DL (ref 8–23)
CALCIUM SERPL-MCNC: 8.8 MG/DL (ref 8.8–10.2)
CHLORIDE SERPL-SCNC: 104 MMOL/L (ref 98–107)
CO2 SERPL-SCNC: 24 MMOL/L (ref 22–29)
CREAT SERPL-MCNC: 1.4 MG/DL (ref 0.7–1.2)
EOSINOPHIL # BLD: 0.1 K/UL (ref 0.05–0.5)
EOSINOPHILS RELATIVE PERCENT: 1 % (ref 0–6)
ERYTHROCYTE [DISTWIDTH] IN BLOOD BY AUTOMATED COUNT: 13.2 % (ref 11.5–15)
GFR, ESTIMATED: 49 ML/MIN/1.73M2
GLUCOSE BLD-MCNC: 166 MG/DL (ref 74–99)
GLUCOSE SERPL-MCNC: 171 MG/DL (ref 74–99)
HCT VFR BLD AUTO: 37.2 % (ref 37–54)
HGB BLD-MCNC: 12.3 G/DL (ref 12.5–16.5)
IMM GRANULOCYTES # BLD AUTO: 0.06 K/UL (ref 0–0.58)
IMM GRANULOCYTES NFR BLD: 1 % (ref 0–5)
LYMPHOCYTES NFR BLD: 1.12 K/UL (ref 1.5–4)
LYMPHOCYTES RELATIVE PERCENT: 9 % (ref 20–42)
MAGNESIUM SERPL-MCNC: 1.1 MG/DL (ref 1.6–2.4)
MCH RBC QN AUTO: 31.9 PG (ref 26–35)
MCHC RBC AUTO-ENTMCNC: 33.1 G/DL (ref 32–34.5)
MCV RBC AUTO: 96.4 FL (ref 80–99.9)
MONOCYTES NFR BLD: 0.9 K/UL (ref 0.1–0.95)
MONOCYTES NFR BLD: 8 % (ref 2–12)
NEUTROPHILS NFR BLD: 82 % (ref 43–80)
NEUTS SEG NFR BLD: 9.78 K/UL (ref 1.8–7.3)
PLATELET, FLUORESCENCE: 160 K/UL (ref 130–450)
PMV BLD AUTO: 13.3 FL (ref 7–12)
POTASSIUM SERPL-SCNC: 4.3 MMOL/L (ref 3.5–5.1)
PROT SERPL-MCNC: 6.7 G/DL (ref 6.4–8.3)
RBC # BLD AUTO: 3.86 M/UL (ref 3.8–5.8)
SODIUM SERPL-SCNC: 140 MMOL/L (ref 136–145)
T4 FREE SERPL-MCNC: 1.2 NG/DL (ref 0.9–1.7)
TROPONIN I SERPL HS-MCNC: 46 NG/L (ref 0–22)
TSH SERPL DL<=0.05 MIU/L-ACNC: 3.79 UIU/ML (ref 0.27–4.2)
WBC OTHER # BLD: 12 K/UL (ref 4.5–11.5)

## 2025-07-22 PROCEDURE — 80053 COMPREHEN METABOLIC PANEL: CPT

## 2025-07-22 PROCEDURE — 85025 COMPLETE CBC W/AUTO DIFF WBC: CPT

## 2025-07-22 PROCEDURE — 83735 ASSAY OF MAGNESIUM: CPT

## 2025-07-22 PROCEDURE — 2580000003 HC RX 258

## 2025-07-22 PROCEDURE — 71045 X-RAY EXAM CHEST 1 VIEW: CPT

## 2025-07-22 PROCEDURE — 6360000002 HC RX W HCPCS: Performed by: STUDENT IN AN ORGANIZED HEALTH CARE EDUCATION/TRAINING PROGRAM

## 2025-07-22 PROCEDURE — 6360000002 HC RX W HCPCS: Performed by: EMERGENCY MEDICINE

## 2025-07-22 PROCEDURE — 84443 ASSAY THYROID STIM HORMONE: CPT

## 2025-07-22 PROCEDURE — 6360000002 HC RX W HCPCS

## 2025-07-22 PROCEDURE — 84439 ASSAY OF FREE THYROXINE: CPT

## 2025-07-22 PROCEDURE — 2060000000 HC ICU INTERMEDIATE R&B

## 2025-07-22 PROCEDURE — 99223 1ST HOSP IP/OBS HIGH 75: CPT | Performed by: STUDENT IN AN ORGANIZED HEALTH CARE EDUCATION/TRAINING PROGRAM

## 2025-07-22 PROCEDURE — 2580000003 HC RX 258: Performed by: EMERGENCY MEDICINE

## 2025-07-22 PROCEDURE — 84484 ASSAY OF TROPONIN QUANT: CPT

## 2025-07-22 PROCEDURE — 82962 GLUCOSE BLOOD TEST: CPT

## 2025-07-22 PROCEDURE — 99285 EMERGENCY DEPT VISIT HI MDM: CPT

## 2025-07-22 PROCEDURE — 96365 THER/PROPH/DIAG IV INF INIT: CPT

## 2025-07-22 PROCEDURE — 96366 THER/PROPH/DIAG IV INF ADDON: CPT

## 2025-07-22 PROCEDURE — 96375 TX/PRO/DX INJ NEW DRUG ADDON: CPT

## 2025-07-22 PROCEDURE — 83880 ASSAY OF NATRIURETIC PEPTIDE: CPT

## 2025-07-22 PROCEDURE — 93005 ELECTROCARDIOGRAM TRACING: CPT

## 2025-07-22 RX ORDER — ATORVASTATIN CALCIUM 10 MG/1
10 TABLET, FILM COATED ORAL DAILY
Status: DISCONTINUED | OUTPATIENT
Start: 2025-07-23 | End: 2025-07-25 | Stop reason: HOSPADM

## 2025-07-22 RX ORDER — ENOXAPARIN SODIUM 100 MG/ML
40 INJECTION SUBCUTANEOUS DAILY
Status: DISCONTINUED | OUTPATIENT
Start: 2025-07-22 | End: 2025-07-23

## 2025-07-22 RX ORDER — FAMOTIDINE 20 MG/1
20 TABLET, FILM COATED ORAL 2 TIMES DAILY
Status: DISCONTINUED | OUTPATIENT
Start: 2025-07-22 | End: 2025-07-22

## 2025-07-22 RX ORDER — SODIUM CHLORIDE 0.9 % (FLUSH) 0.9 %
5-40 SYRINGE (ML) INJECTION PRN
Status: DISCONTINUED | OUTPATIENT
Start: 2025-07-22 | End: 2025-07-25 | Stop reason: HOSPADM

## 2025-07-22 RX ORDER — VALSARTAN 160 MG/1
160 TABLET ORAL DAILY
Status: DISCONTINUED | OUTPATIENT
Start: 2025-07-23 | End: 2025-07-23

## 2025-07-22 RX ORDER — SODIUM CHLORIDE 9 MG/ML
INJECTION, SOLUTION INTRAVENOUS PRN
Status: DISCONTINUED | OUTPATIENT
Start: 2025-07-22 | End: 2025-07-25 | Stop reason: HOSPADM

## 2025-07-22 RX ORDER — ACETAMINOPHEN 650 MG/1
650 SUPPOSITORY RECTAL EVERY 6 HOURS PRN
Status: DISCONTINUED | OUTPATIENT
Start: 2025-07-22 | End: 2025-07-25 | Stop reason: HOSPADM

## 2025-07-22 RX ORDER — ACETAMINOPHEN 325 MG/1
650 TABLET ORAL EVERY 6 HOURS PRN
Status: DISCONTINUED | OUTPATIENT
Start: 2025-07-22 | End: 2025-07-25 | Stop reason: HOSPADM

## 2025-07-22 RX ORDER — POTASSIUM CHLORIDE 7.45 MG/ML
10 INJECTION INTRAVENOUS PRN
Status: DISCONTINUED | OUTPATIENT
Start: 2025-07-22 | End: 2025-07-25 | Stop reason: HOSPADM

## 2025-07-22 RX ORDER — DEXTROSE MONOHYDRATE 100 MG/ML
INJECTION, SOLUTION INTRAVENOUS CONTINUOUS PRN
Status: DISCONTINUED | OUTPATIENT
Start: 2025-07-22 | End: 2025-07-25 | Stop reason: HOSPADM

## 2025-07-22 RX ORDER — INSULIN LISPRO 100 [IU]/ML
0-4 INJECTION, SOLUTION INTRAVENOUS; SUBCUTANEOUS
Status: DISCONTINUED | OUTPATIENT
Start: 2025-07-22 | End: 2025-07-25 | Stop reason: HOSPADM

## 2025-07-22 RX ORDER — SODIUM CHLORIDE 0.9 % (FLUSH) 0.9 %
5-40 SYRINGE (ML) INJECTION EVERY 12 HOURS SCHEDULED
Status: DISCONTINUED | OUTPATIENT
Start: 2025-07-22 | End: 2025-07-25 | Stop reason: HOSPADM

## 2025-07-22 RX ORDER — MAGNESIUM SULFATE IN WATER 40 MG/ML
2000 INJECTION, SOLUTION INTRAVENOUS ONCE
Status: COMPLETED | OUTPATIENT
Start: 2025-07-22 | End: 2025-07-22

## 2025-07-22 RX ORDER — POTASSIUM CHLORIDE 1500 MG/1
40 TABLET, EXTENDED RELEASE ORAL PRN
Status: DISCONTINUED | OUTPATIENT
Start: 2025-07-22 | End: 2025-07-25 | Stop reason: HOSPADM

## 2025-07-22 RX ORDER — POLYETHYLENE GLYCOL 3350 17 G/17G
17 POWDER, FOR SOLUTION ORAL DAILY PRN
Status: DISCONTINUED | OUTPATIENT
Start: 2025-07-22 | End: 2025-07-25 | Stop reason: HOSPADM

## 2025-07-22 RX ORDER — ONDANSETRON 4 MG/1
4 TABLET, ORALLY DISINTEGRATING ORAL EVERY 8 HOURS PRN
Status: DISCONTINUED | OUTPATIENT
Start: 2025-07-22 | End: 2025-07-25 | Stop reason: HOSPADM

## 2025-07-22 RX ORDER — PANTOPRAZOLE SODIUM 40 MG/1
40 TABLET, DELAYED RELEASE ORAL
Status: DISCONTINUED | OUTPATIENT
Start: 2025-07-23 | End: 2025-07-25 | Stop reason: HOSPADM

## 2025-07-22 RX ORDER — VALSARTAN AND HYDROCHLOROTHIAZIDE 160; 12.5 MG/1; MG/1
1 TABLET, FILM COATED ORAL DAILY
Status: DISCONTINUED | OUTPATIENT
Start: 2025-07-23 | End: 2025-07-22 | Stop reason: CLARIF

## 2025-07-22 RX ORDER — DILTIAZEM HYDROCHLORIDE 5 MG/ML
10 INJECTION INTRAVENOUS ONCE
Status: COMPLETED | OUTPATIENT
Start: 2025-07-22 | End: 2025-07-22

## 2025-07-22 RX ORDER — MAGNESIUM SULFATE IN WATER 40 MG/ML
2000 INJECTION, SOLUTION INTRAVENOUS PRN
Status: DISCONTINUED | OUTPATIENT
Start: 2025-07-22 | End: 2025-07-25 | Stop reason: HOSPADM

## 2025-07-22 RX ORDER — DIGOXIN 0.25 MG/ML
250 INJECTION INTRAMUSCULAR; INTRAVENOUS ONCE
Status: COMPLETED | OUTPATIENT
Start: 2025-07-22 | End: 2025-07-22

## 2025-07-22 RX ORDER — ONDANSETRON 2 MG/ML
4 INJECTION INTRAMUSCULAR; INTRAVENOUS EVERY 6 HOURS PRN
Status: DISCONTINUED | OUTPATIENT
Start: 2025-07-22 | End: 2025-07-25 | Stop reason: HOSPADM

## 2025-07-22 RX ORDER — HYDROCHLOROTHIAZIDE 12.5 MG/1
12.5 TABLET ORAL DAILY
Status: DISCONTINUED | OUTPATIENT
Start: 2025-07-23 | End: 2025-07-23

## 2025-07-22 RX ORDER — GLUCAGON 1 MG/ML
1 KIT INJECTION PRN
Status: DISCONTINUED | OUTPATIENT
Start: 2025-07-22 | End: 2025-07-25 | Stop reason: HOSPADM

## 2025-07-22 RX ORDER — 0.9 % SODIUM CHLORIDE 0.9 %
1000 INTRAVENOUS SOLUTION INTRAVENOUS ONCE
Status: COMPLETED | OUTPATIENT
Start: 2025-07-22 | End: 2025-07-22

## 2025-07-22 RX ADMIN — DILTIAZEM HYDROCHLORIDE 10 MG: 5 INJECTION, SOLUTION INTRAVENOUS at 13:25

## 2025-07-22 RX ADMIN — SODIUM CHLORIDE 1000 ML: 0.9 INJECTION, SOLUTION INTRAVENOUS at 11:17

## 2025-07-22 RX ADMIN — ENOXAPARIN SODIUM 40 MG: 100 INJECTION SUBCUTANEOUS at 20:49

## 2025-07-22 RX ADMIN — DIGOXIN 250 MCG: 0.25 INJECTION INTRAMUSCULAR; INTRAVENOUS at 16:41

## 2025-07-22 RX ADMIN — MAGNESIUM SULFATE HEPTAHYDRATE 2000 MG: 40 INJECTION, SOLUTION INTRAVENOUS at 13:06

## 2025-07-22 RX ADMIN — DILTIAZEM HYDROCHLORIDE 5 MG/HR: 5 INJECTION INTRAVENOUS at 13:27

## 2025-07-22 RX ADMIN — DILTIAZEM HYDROCHLORIDE 5 MG/HR: 5 INJECTION, SOLUTION INTRAVENOUS at 22:18

## 2025-07-22 ASSESSMENT — LIFESTYLE VARIABLES
HOW MANY STANDARD DRINKS CONTAINING ALCOHOL DO YOU HAVE ON A TYPICAL DAY: PATIENT DOES NOT DRINK
HOW OFTEN DO YOU HAVE A DRINK CONTAINING ALCOHOL: NEVER

## 2025-07-22 ASSESSMENT — ENCOUNTER SYMPTOMS: SHORTNESS OF BREATH: 1

## 2025-07-22 NOTE — H&P
[facility] [place] Hospitalist Group History and Physical      CHIEF COMPLAINT: Chest pain and weakness    History of Present Illness: 80-year-old gentleman with past medical history significant for diabetes mellitus type 2, hypertension, hyperlipidemia, GERD presented to ED at [facility] with symptoms of chest pain and weakness.  He was also feeling short of breath along with generalized weakness.  Symptoms started few hours before he came to ED.  Workup in ED showed patient to be tachycardic with heart rate in 160s.  Other labs showed magnesium low at 1.1, WBC count 12, EKG showing atrial flutter with RVR.  Patient was sent to [place] for further workup and treatment.  Denies any chest pain, belly pain, nausea vomiting diarrhea, cough, no lightheadedness or dizziness, no fever or chills, no focal deficits.    Informant(s) for H&P:    REVIEW OF SYSTEMS:  A comprehensive review of systems was negative except for: what is in the HPI      PMH:  Past Medical History:   Diagnosis Date    Diabetes mellitus (HCC)     Erectile dysfunction     GERD (gastroesophageal reflux disease)     Hyperlipidemia     Hypertension     Type 2 diabetes mellitus without complication (HCC)        Surgical History:  Past Surgical History:   Procedure Laterality Date    CARPAL TUNNEL RELEASE  2004 2005    EYE SURGERY      bilat cataract extracftions    HEMORRHOID SURGERY      TOENAIL EXCISION         Medications Prior to Admission:    Prior to Admission medications    Medication Sig Start Date End Date Taking? Authorizing Provider   valsartan-hydroCHLOROthiazide (DIOVAN-HCT) 160-12.5 MG per tablet TAKE ONE TABLET BY MOUTH DAILY 7/18/25  Yes Flex Ayoub MD   tadalafil (CIALIS) 20 MG tablet Take 1 tablet by mouth as needed for Erectile Dysfunction 4/16/25  Yes Flex Ayoub MD   atorvastatin (LIPITOR) 10 MG tablet Take 1 tablet by mouth daily 4/16/25 10/13/25 Yes Flex Ayoub MD   pantoprazole (PROTONIX) 40 MG tablet Take 1 tablet by

## 2025-07-22 NOTE — PROGRESS NOTES
4 Eyes Skin Assessment     NAME:  Alexander Rivera  YOB: 1945  MEDICAL RECORD NUMBER:  [id number]    The patient is being assessed for  Admission    I agree that at least one RN has performed a thorough Head to Toe Skin Assessment on the patient. ALL assessment sites listed below have been assessed.      Areas assessed by both nurses:    Head, Face, Ears, Shoulders, Back, Chest, Arms, Elbows, Hands, Sacrum. Buttock, Coccyx, Ischium, Legs. Feet and Heels, and Under Medical Devices         Does the Patient have a Wound? No noted wound(s)       Wes Prevention initiated by RN: No  Wound Care Orders initiated by RN: No    Pressure Injury (Stage 3,4, Unstageable, DTI, NWPT, and Complex wounds) if present, place Wound referral order by RN under : No    New Ostomies, if present place, Ostomy referral order under : No     Nurse 1 eSignature: Electronically signed by Gila Cardoza RN on 7/22/25 at 6:48 PM EDT    **SHARE this note so that the co-signing nurse can place an eSignature**    Nurse 2 eSignature: Electronically signed by Ivy Marshall RN on 7/22/25 at 6:51 PM EDT

## 2025-07-22 NOTE — ED PROVIDER NOTES
80-year-old male presenting with concern about chest pain and some weakness.  Hard to take a deep breath, he feels little shaky as well.  Tachycardic on arrival in the 160s.  No hypoxia or respiratory distress.  Awake and alert and oriented x 4, answer questions appropriately.         Family History   Problem Relation Age of Onset    Coronary Art Dis Mother     Cancer Mother         UTERINE    Diabetes Mother     Other Father 65        AAA    Cancer Brother         Thyroid  CA     Past Surgical History:   Procedure Laterality Date    CARPAL TUNNEL RELEASE  2004 2005    EYE SURGERY      bilat cataract extracftions    HEMORRHOID SURGERY      TOENAIL EXCISION         Review of Systems   Constitutional:  Negative for chills and fever.   Respiratory:  Positive for chest tightness and shortness of breath.    Cardiovascular:  Positive for palpitations.   Neurological:  Positive for weakness.        Physical Exam  Constitutional:       General: He is not in acute distress.     Appearance: He is well-developed.   HENT:      Head: Normocephalic and atraumatic.   Eyes:      Pupils: Pupils are equal, round, and reactive to light.   Neck:      Thyroid: No thyromegaly.   Cardiovascular:      Rate and Rhythm: Regular rhythm. Tachycardia present.   Pulmonary:      Effort: Pulmonary effort is normal. No respiratory distress.      Breath sounds: Normal breath sounds. No wheezing.   Abdominal:      General: There is no distension.      Palpations: Abdomen is soft. There is no mass.      Tenderness: There is no abdominal tenderness. There is no guarding or rebound.   Musculoskeletal:         General: No tenderness. Normal range of motion.      Cervical back: Normal range of motion and neck supple.   Skin:     General: Skin is warm and dry.      Findings: No erythema.   Neurological:      Mental Status: He is alert and oriented to person, place, and time.      Cranial Nerves: No cranial nerve deficit.   Psychiatric:         Mood and

## 2025-07-23 PROBLEM — E83.42 HYPOMAGNESEMIA: Status: ACTIVE | Noted: 2025-07-23

## 2025-07-23 LAB
ABSOLUTE BANDS: NORMAL K/UL (ref 0–1)
ABSOLUTE PLASMA CELLS: NORMAL K/UL
ALBUMIN SERPL-MCNC: 3.4 G/DL (ref 3.5–5.2)
ALBUMIN SERPL-MCNC: NORMAL G/DL (ref 3.5–5.2)
ALBUMIN/GLOB SERPL: NORMAL {RATIO} (ref 1–2.5)
ALP SERPL-CCNC: 82 U/L (ref 40–129)
ALP SERPL-CCNC: NORMAL U/L (ref 40–129)
ALT SERPL-CCNC: 14 U/L (ref 0–50)
ALT SERPL-CCNC: NORMAL U/L (ref 5–41)
ANION GAP SERPL CALCULATED.3IONS-SCNC: 11 MMOL/L (ref 7–16)
ANION GAP SERPL CALCULATED.3IONS-SCNC: NORMAL MMOL/L (ref 9–17)
AST SERPL-CCNC: 24 U/L (ref 0–50)
AST SERPL-CCNC: NORMAL U/L
ATYPICAL LYMPHOCYTE ABSOLUTE COUNT: NORMAL K/UL
ATYPICAL LYMPHOCYTES: NORMAL %
BANDS: NORMAL %
BASOPHILS # BLD: 0.03 K/UL (ref 0–0.2)
BASOPHILS # BLD: NORMAL K/UL (ref 0–0.2)
BASOPHILS NFR BLD: 0 % (ref 0–2)
BASOPHILS NFR BLD: NORMAL % (ref 0–2)
BILIRUB SERPL-MCNC: 1.2 MG/DL (ref 0–1.2)
BILIRUB SERPL-MCNC: NORMAL MG/DL (ref 0.3–1.2)
BLASTS ABSOLUTE COUNT: NORMAL K/UL
BLASTS: NORMAL %
BUN SERPL-MCNC: 24 MG/DL (ref 8–23)
BUN SERPL-MCNC: NORMAL MG/DL (ref 8–23)
BUN/CREAT SERPL: NORMAL (ref 9–20)
CALCIUM SERPL-MCNC: 8.2 MG/DL (ref 8.8–10.2)
CALCIUM SERPL-MCNC: NORMAL MG/DL (ref 8.6–10.4)
CHLORIDE SERPL-SCNC: 102 MMOL/L (ref 98–107)
CHLORIDE SERPL-SCNC: NORMAL MMOL/L (ref 98–107)
CO2 SERPL-SCNC: 22 MMOL/L (ref 22–29)
CO2 SERPL-SCNC: NORMAL MMOL/L (ref 20–31)
CREAT SERPL-MCNC: 1.3 MG/DL (ref 0.7–1.2)
CREAT SERPL-MCNC: NORMAL MG/DL (ref 0.7–1.2)
D-DIMER QUANTITATIVE: 237 NG/ML DDU (ref 0–230)
EKG ATRIAL RATE: 324 BPM
EKG Q-T INTERVAL: 384 MS
EKG QRS DURATION: 116 MS
EKG QTC CALCULATION (BAZETT): 446 MS
EKG R AXIS: -3 DEGREES
EKG T AXIS: 3 DEGREES
EKG VENTRICULAR RATE: 81 BPM
EOSINOPHIL # BLD: 0.05 K/UL (ref 0.05–0.5)
EOSINOPHIL # BLD: NORMAL K/UL (ref 0–0.4)
EOSINOPHILS RELATIVE PERCENT: 1 % (ref 0–6)
EOSINOPHILS RELATIVE PERCENT: NORMAL % (ref 1–4)
ERYTHROCYTE [DISTWIDTH] IN BLOOD BY AUTOMATED COUNT: 13.3 % (ref 11.5–15)
ERYTHROCYTE [DISTWIDTH] IN BLOOD BY AUTOMATED COUNT: NORMAL % (ref 11.8–14.4)
GFR, ESTIMATED: 57 ML/MIN/1.73M2
GFR, ESTIMATED: NORMAL ML/MIN/1.73M2
GLUCOSE BLD-MCNC: 154 MG/DL (ref 74–99)
GLUCOSE BLD-MCNC: 191 MG/DL (ref 74–99)
GLUCOSE BLD-MCNC: 225 MG/DL (ref 74–99)
GLUCOSE SERPL-MCNC: 182 MG/DL (ref 74–99)
GLUCOSE SERPL-MCNC: NORMAL MG/DL (ref 70–99)
HCT VFR BLD AUTO: 33.2 % (ref 37–54)
HCT VFR BLD AUTO: NORMAL % (ref 40.7–50.3)
HGB BLD-MCNC: 10.5 G/DL (ref 12.5–16.5)
HGB BLD-MCNC: NORMAL G/DL (ref 13–17)
IMM GRANULOCYTES # BLD AUTO: 0.07 K/UL (ref 0–0.58)
IMM GRANULOCYTES # BLD AUTO: NORMAL K/UL (ref 0–0.3)
IMM GRANULOCYTES NFR BLD: 1 % (ref 0–5)
IMM GRANULOCYTES NFR BLD: NORMAL %
LYMPHOCYTES NFR BLD: 1.16 K/UL (ref 1.5–4)
LYMPHOCYTES NFR BLD: NORMAL K/UL (ref 1–4.8)
LYMPHOCYTES RELATIVE PERCENT: 12 % (ref 20–42)
LYMPHOCYTES RELATIVE PERCENT: NORMAL % (ref 24–44)
MAGNESIUM SERPL-MCNC: 1.5 MG/DL (ref 1.6–2.4)
MCH RBC QN AUTO: 31.2 PG (ref 26–35)
MCH RBC QN AUTO: NORMAL PG (ref 25.2–33.5)
MCHC RBC AUTO-ENTMCNC: 31.6 G/DL (ref 32–34.5)
MCHC RBC AUTO-ENTMCNC: NORMAL G/DL (ref 28.4–34.8)
MCV RBC AUTO: 98.5 FL (ref 80–99.9)
MCV RBC AUTO: NORMAL FL (ref 82.6–102.9)
METAMYELOCYTES ABSOLUTE COUNT: NORMAL K/UL
METAMYELOCYTES: NORMAL %
MONOCYTES NFR BLD: 1.37 K/UL (ref 0.1–0.95)
MONOCYTES NFR BLD: 14 % (ref 2–12)
MONOCYTES NFR BLD: NORMAL % (ref 1–7)
MONOCYTES NFR BLD: NORMAL K/UL (ref 0.1–0.8)
MYELOCYTES ABSOLUTE COUNT: NORMAL K/UL
MYELOCYTES: NORMAL %
NEUTROPHILS NFR BLD: 73 % (ref 43–80)
NEUTROPHILS NFR BLD: NORMAL % (ref 36–66)
NEUTS SEG NFR BLD: 7.36 K/UL (ref 1.8–7.3)
NEUTS SEG NFR BLD: NORMAL K/UL (ref 1.8–7.7)
NRBC BLD-RTO: NORMAL PER 100 WBC
NUCLEATED RED BLOOD CELLS: NORMAL PER 100 WBC
OTHER CELL: NORMAL %
PLASMA CELLS: NORMAL %
PLATELET # BLD AUTO: NORMAL K/UL (ref 138–453)
PLATELET ESTIMATE: NORMAL
PLATELET, FLUORESCENCE: 134 K/UL (ref 130–450)
PLATELET, FLUORESCENCE: NORMAL K/UL (ref 138–453)
PLATELETS.RETICULATED NFR BLD AUTO: NORMAL % (ref 1.1–10.3)
PMV BLD AUTO: 13.4 FL (ref 7–12)
PMV BLD AUTO: NORMAL FL (ref 8.1–13.5)
POTASSIUM SERPL-SCNC: 4.2 MMOL/L (ref 3.5–5.1)
POTASSIUM SERPL-SCNC: NORMAL MMOL/L (ref 3.7–5.3)
PROMYELOCYTES ABSOLUTE COUNT: NORMAL K/UL
PROMYELOCYTES: NORMAL %
PROT SERPL-MCNC: 5.8 G/DL (ref 6.4–8.3)
PROT SERPL-MCNC: NORMAL G/DL (ref 6.4–8.3)
RBC # BLD AUTO: 3.37 M/UL (ref 3.8–5.8)
RBC # BLD AUTO: NORMAL M/UL (ref 4.21–5.77)
RBC # BLD: NORMAL 10*6/UL
SODIUM SERPL-SCNC: 136 MMOL/L (ref 136–145)
SODIUM SERPL-SCNC: NORMAL MMOL/L (ref 135–144)
TROPONIN I SERPL HS-MCNC: 51 NG/L (ref 0–22)
WBC # BLD: NORMAL 10*3/UL
WBC OTHER # BLD: 10 K/UL (ref 4.5–11.5)
WBC OTHER # BLD: NORMAL K/UL (ref 3.5–11.3)

## 2025-07-23 PROCEDURE — 85379 FIBRIN DEGRADATION QUANT: CPT

## 2025-07-23 PROCEDURE — 6370000000 HC RX 637 (ALT 250 FOR IP): Performed by: STUDENT IN AN ORGANIZED HEALTH CARE EDUCATION/TRAINING PROGRAM

## 2025-07-23 PROCEDURE — 84484 ASSAY OF TROPONIN QUANT: CPT

## 2025-07-23 PROCEDURE — 99233 SBSQ HOSP IP/OBS HIGH 50: CPT | Performed by: STUDENT IN AN ORGANIZED HEALTH CARE EDUCATION/TRAINING PROGRAM

## 2025-07-23 PROCEDURE — 6360000002 HC RX W HCPCS: Performed by: STUDENT IN AN ORGANIZED HEALTH CARE EDUCATION/TRAINING PROGRAM

## 2025-07-23 PROCEDURE — 6360000002 HC RX W HCPCS: Performed by: INTERNAL MEDICINE

## 2025-07-23 PROCEDURE — 85025 COMPLETE CBC W/AUTO DIFF WBC: CPT

## 2025-07-23 PROCEDURE — 93005 ELECTROCARDIOGRAM TRACING: CPT | Performed by: NURSE PRACTITIONER

## 2025-07-23 PROCEDURE — 83735 ASSAY OF MAGNESIUM: CPT

## 2025-07-23 PROCEDURE — 2500000003 HC RX 250 WO HCPCS: Performed by: STUDENT IN AN ORGANIZED HEALTH CARE EDUCATION/TRAINING PROGRAM

## 2025-07-23 PROCEDURE — 93010 ELECTROCARDIOGRAM REPORT: CPT | Performed by: INTERNAL MEDICINE

## 2025-07-23 PROCEDURE — 82962 GLUCOSE BLOOD TEST: CPT

## 2025-07-23 PROCEDURE — 2060000000 HC ICU INTERMEDIATE R&B

## 2025-07-23 PROCEDURE — 99222 1ST HOSP IP/OBS MODERATE 55: CPT | Performed by: INTERNAL MEDICINE

## 2025-07-23 PROCEDURE — 80053 COMPREHEN METABOLIC PANEL: CPT

## 2025-07-23 PROCEDURE — 6370000000 HC RX 637 (ALT 250 FOR IP): Performed by: INTERNAL MEDICINE

## 2025-07-23 PROCEDURE — APPSS180 APP SPLIT SHARED TIME > 60 MINUTES: Performed by: NURSE PRACTITIONER

## 2025-07-23 RX ORDER — ENOXAPARIN SODIUM 100 MG/ML
1 INJECTION SUBCUTANEOUS 2 TIMES DAILY
Status: DISCONTINUED | OUTPATIENT
Start: 2025-07-23 | End: 2025-07-25

## 2025-07-23 RX ORDER — METOPROLOL SUCCINATE 25 MG/1
25 TABLET, EXTENDED RELEASE ORAL 2 TIMES DAILY
Status: DISCONTINUED | OUTPATIENT
Start: 2025-07-23 | End: 2025-07-25 | Stop reason: HOSPADM

## 2025-07-23 RX ORDER — VALSARTAN 40 MG/1
40 TABLET ORAL DAILY
Status: DISCONTINUED | OUTPATIENT
Start: 2025-07-23 | End: 2025-07-25 | Stop reason: HOSPADM

## 2025-07-23 RX ADMIN — ENOXAPARIN SODIUM 100 MG: 100 INJECTION SUBCUTANEOUS at 21:12

## 2025-07-23 RX ADMIN — PANTOPRAZOLE SODIUM 40 MG: 40 TABLET, DELAYED RELEASE ORAL at 05:21

## 2025-07-23 RX ADMIN — MAGNESIUM SULFATE HEPTAHYDRATE 2000 MG: 40 INJECTION, SOLUTION INTRAVENOUS at 10:43

## 2025-07-23 RX ADMIN — SODIUM CHLORIDE, PRESERVATIVE FREE 10 ML: 5 INJECTION INTRAVENOUS at 21:12

## 2025-07-23 RX ADMIN — INSULIN LISPRO 1 UNITS: 100 INJECTION, SOLUTION INTRAVENOUS; SUBCUTANEOUS at 15:49

## 2025-07-23 RX ADMIN — ATORVASTATIN CALCIUM 10 MG: 10 TABLET, FILM COATED ORAL at 07:49

## 2025-07-23 RX ADMIN — METOPROLOL SUCCINATE 25 MG: 25 TABLET, EXTENDED RELEASE ORAL at 21:10

## 2025-07-23 RX ADMIN — ENOXAPARIN SODIUM 40 MG: 100 INJECTION SUBCUTANEOUS at 07:49

## 2025-07-23 RX ADMIN — VALSARTAN 40 MG: 40 TABLET ORAL at 13:05

## 2025-07-23 RX ADMIN — METOPROLOL SUCCINATE 25 MG: 25 TABLET, EXTENDED RELEASE ORAL at 11:56

## 2025-07-23 NOTE — CARE COORDINATION
CARE MANAGEMENT.... Met with patient and his wife Jennifer at the bedside. They [place] with 3 steps to enter. Their son, also lives with them. He is disabled from a CVA last year and so they care for him too. He uses no dme and does not have history with [facility] or [facility]. PCP is Dr ISAIAS Ayoub. Cardio on for new afib. Iv cardizem gtt stopped and Toprol XL started. Plan for LARON/DCCV tomorrow. On lovenox. Anticipate eliquis on discharge. Plan is home. No needs. Will follow.

## 2025-07-23 NOTE — PROGRESS NOTES
[facility] Hospitalist Progress Note    Admitting Date and Time: 7/22/2025  6:24 PM  Admit Dx: Atrial flutter with rapid ventricular response (HCC) [I48.92]    Subjective:  Patient is being followed for Atrial flutter with rapid ventricular response (HCC) [I48.92]       Patient was seen and examined  ROS: denies fever, chills, cp, sob, n/v, HA unless stated above.      metoprolol succinate  25 mg Oral BID    enoxaparin  1 mg/kg SubCUTAneous BID    valsartan  40 mg Oral Daily    sodium chloride flush  5-40 mL IntraVENous 2 times per day    atorvastatin  10 mg Oral Daily    pantoprazole  40 mg Oral QAM AC    insulin lispro  0-4 Units SubCUTAneous 4x Daily AC & HS     sodium chloride flush, 5-40 mL, PRN  sodium chloride, , PRN  potassium chloride, 40 mEq, PRN   Or  potassium alternative oral replacement, 40 mEq, PRN   Or  potassium chloride, 10 mEq, PRN  magnesium sulfate, 2,000 mg, PRN  ondansetron, 4 mg, Q8H PRN   Or  ondansetron, 4 mg, Q6H PRN  polyethylene glycol, 17 g, Daily PRN  acetaminophen, 650 mg, Q6H PRN   Or  acetaminophen, 650 mg, Q6H PRN  glucose, 4 tablet, PRN  dextrose bolus, 125 mL, PRN   Or  dextrose bolus, 250 mL, PRN  glucagon (rDNA), 1 mg, PRN  dextrose, , Continuous PRN         Objective:    /65   Pulse 53   Temp 97.5 °F (36.4 °C) (Oral)   Resp 18   Ht 1.7 m (5' 6.93\")   Wt 96.2 kg (212 lb 1.3 oz)   SpO2 93%   BMI 33.29 kg/m²     General Appearance: alert and oriented to person, place and time and in no acute distress  Skin: warm and dry  Head: normocephalic and atraumatic  Eyes: pupils equal, round, and reactive to light, extraocular eye movements intact, conjunctivae normal  Neck: neck supple and non tender without mass   Pulmonary/Chest: clear to auscultation bilaterally- no wheezes, rales or rhonchi, normal air movement, no respiratory distress  Cardiovascular: normal rate, normal S1 and S2 and no carotid bruits  Abdomen: soft, non-tender, non-distended, normal bowel

## 2025-07-23 NOTE — CONSULTS
Inpatient Cardiology Consultation      Reason for Consult: Atrial flutter with RVR    Consulting Physician: Dr. Wills   Requesting Physician: Dr. Curiel    Date of Consultation: 7/23/2025    HISTORY OF PRESENT ILLNESS:   Alexander Rivera  is a 80 y.o.  male unknown to [facility].  No prior cardiac evaluation or testing to review in epic.      PMH: see below    [facility] ED 7/22/2025 for 6 hours.  Transferred to [facility] because there were beds available.  Patient presented to the emergency department for complaint of chest pain and some weakness.  Reported that it was hard to take a deep breath and that he was feeling a little shaky as well.  He was tachycardic on arrival in the 160s.  No hypoxia or respiratory distress.  Arrival vitals:  Significant Labs: WBC 12, Hgb 12.3, HCT 37.2, , , K4.3, glucose 171, BUN 29, CR 1.4, GFR 49, normal GI profile, troponin 46, mag 1.1, pro-- BNP 1555, TSH 3.79.  RAD: CXR-no pneumonia or pleural effusion  ED EKG:   ED Diagnosis: Atrial flutter with RVR.  ED treatment: Digoxin 250 mcg IV, magnesium sulfate 2000 mg IV, diltiazem injection 10 mg IV, diltiazem infusion, normal saline 1000 mL IV.    Patient seen and examined.  Recent vitals:Height: 1.7 m (5' 6.93\"), Weight - Scale: 96.2 kg (212 lb 1.3 oz), BP: (!) 101/56, Pain 0-10: Location: generalized;   T98, RR 18, P83, /56, SpO2 95% on room air.  [facility], he was resting in bed with his wife at bedside in no apparent distress.  He reports that he is feeling okay today but did have return of elevated heart rates when he got up to the bathroom last night.  He denies any current chest pain while at rest or shortness of breath but he does report chest discomfort that he feels if he takes a large deep breath.  He is somewhat unable to describe this pain reports that it feels like when you are sick with a cold in your lungs and try to take a deep breath.  He reports this

## 2025-07-23 NOTE — PLAN OF CARE
Problem: Chronic Conditions and Co-morbidities  Goal: Patient's chronic conditions and co-morbidity symptoms are monitored and maintained or improved  7/23/2025 0931 by Gila Cardoza RN  Outcome: Progressing     Problem: Discharge Planning  Goal: Discharge to home or other facility with appropriate resources  7/23/2025 0931 by Gila Cardoza RN  Outcome: Progressing     Problem: Safety - Adult  Goal: Free from fall injury  7/23/2025 0931 by Gila Cardoza RN  Outcome: Progressing

## 2025-07-23 NOTE — ACP (ADVANCE CARE PLANNING)
Advance Care Planning   Healthcare Decision Maker:    Primary Decision Maker: Jennifer Rivera - [facility] - [phone]

## 2025-07-24 ENCOUNTER — APPOINTMENT (OUTPATIENT)
Dept: NUCLEAR MEDICINE | Age: 80
End: 2025-07-24
Attending: INTERNAL MEDICINE
Payer: MEDICARE

## 2025-07-24 ENCOUNTER — APPOINTMENT (OUTPATIENT)
Age: 80
End: 2025-07-24
Attending: INTERNAL MEDICINE
Payer: MEDICARE

## 2025-07-24 LAB
ANION GAP SERPL CALCULATED.3IONS-SCNC: 9 MMOL/L (ref 7–16)
BASOPHILS # BLD: 0.03 K/UL (ref 0–0.2)
BASOPHILS NFR BLD: 0 % (ref 0–2)
BUN SERPL-MCNC: 23 MG/DL (ref 8–23)
CALCIUM SERPL-MCNC: 8.1 MG/DL (ref 8.8–10.2)
CHLORIDE SERPL-SCNC: 102 MMOL/L (ref 98–107)
CO2 SERPL-SCNC: 23 MMOL/L (ref 22–29)
CREAT SERPL-MCNC: 1.2 MG/DL (ref 0.7–1.2)
ECHO BSA: 2.1 M2
EOSINOPHIL # BLD: 0.1 K/UL (ref 0.05–0.5)
EOSINOPHILS RELATIVE PERCENT: 1 % (ref 0–6)
ERYTHROCYTE [DISTWIDTH] IN BLOOD BY AUTOMATED COUNT: 13.2 % (ref 11.5–15)
GFR, ESTIMATED: 59 ML/MIN/1.73M2
GLUCOSE BLD-MCNC: 131 MG/DL (ref 74–99)
GLUCOSE BLD-MCNC: 195 MG/DL (ref 74–99)
GLUCOSE BLD-MCNC: 238 MG/DL (ref 74–99)
GLUCOSE BLD-MCNC: 272 MG/DL (ref 74–99)
GLUCOSE SERPL-MCNC: 164 MG/DL (ref 74–99)
HCT VFR BLD AUTO: 32 % (ref 37–54)
HGB BLD-MCNC: 10.2 G/DL (ref 12.5–16.5)
IMM GRANULOCYTES # BLD AUTO: 0.03 K/UL (ref 0–0.58)
IMM GRANULOCYTES NFR BLD: 0 % (ref 0–5)
LYMPHOCYTES NFR BLD: 1.53 K/UL (ref 1.5–4)
LYMPHOCYTES RELATIVE PERCENT: 19 % (ref 20–42)
MAGNESIUM SERPL-MCNC: 1.8 MG/DL (ref 1.6–2.4)
MCH RBC QN AUTO: 31.2 PG (ref 26–35)
MCHC RBC AUTO-ENTMCNC: 31.9 G/DL (ref 32–34.5)
MCV RBC AUTO: 97.9 FL (ref 80–99.9)
MONOCYTES NFR BLD: 0.87 K/UL (ref 0.1–0.95)
MONOCYTES NFR BLD: 11 % (ref 2–12)
NEUTROPHILS NFR BLD: 69 % (ref 43–80)
NEUTS SEG NFR BLD: 5.61 K/UL (ref 1.8–7.3)
NUC STRESS EJECTION FRACTION: 77 %
PLATELET, FLUORESCENCE: 135 K/UL (ref 130–450)
PMV BLD AUTO: 13.3 FL (ref 7–12)
POTASSIUM SERPL-SCNC: 3.9 MMOL/L (ref 3.5–5.1)
RBC # BLD AUTO: 3.27 M/UL (ref 3.8–5.8)
SODIUM SERPL-SCNC: 135 MMOL/L (ref 136–145)
STRESS BASELINE DIAS BP: 69 MMHG
STRESS BASELINE HR: 79 BPM
STRESS BASELINE SYS BP: 120 MMHG
STRESS ESTIMATED WORKLOAD: 1 METS
STRESS PEAK DIAS BP: 74 MMHG
STRESS PEAK SYS BP: 135 MMHG
STRESS PERCENT HR ACHIEVED: 59 %
STRESS POST PEAK HR: 82 BPM
STRESS RATE PRESSURE PRODUCT: NORMAL BPM*MMHG
STRESS TARGET HR: 139 BPM
WBC OTHER # BLD: 8.2 K/UL (ref 4.5–11.5)

## 2025-07-24 PROCEDURE — 82962 GLUCOSE BLOOD TEST: CPT

## 2025-07-24 PROCEDURE — 78452 HT MUSCLE IMAGE SPECT MULT: CPT | Performed by: INTERNAL MEDICINE

## 2025-07-24 PROCEDURE — 83735 ASSAY OF MAGNESIUM: CPT

## 2025-07-24 PROCEDURE — 99233 SBSQ HOSP IP/OBS HIGH 50: CPT | Performed by: INTERNAL MEDICINE

## 2025-07-24 PROCEDURE — 93005 ELECTROCARDIOGRAM TRACING: CPT | Performed by: STUDENT IN AN ORGANIZED HEALTH CARE EDUCATION/TRAINING PROGRAM

## 2025-07-24 PROCEDURE — 6370000000 HC RX 637 (ALT 250 FOR IP): Performed by: STUDENT IN AN ORGANIZED HEALTH CARE EDUCATION/TRAINING PROGRAM

## 2025-07-24 PROCEDURE — 6360000002 HC RX W HCPCS: Performed by: INTERNAL MEDICINE

## 2025-07-24 PROCEDURE — 6370000000 HC RX 637 (ALT 250 FOR IP): Performed by: INTERNAL MEDICINE

## 2025-07-24 PROCEDURE — 2060000000 HC ICU INTERMEDIATE R&B

## 2025-07-24 PROCEDURE — 80048 BASIC METABOLIC PNL TOTAL CA: CPT

## 2025-07-24 PROCEDURE — 2500000003 HC RX 250 WO HCPCS: Performed by: STUDENT IN AN ORGANIZED HEALTH CARE EDUCATION/TRAINING PROGRAM

## 2025-07-24 PROCEDURE — 6370000000 HC RX 637 (ALT 250 FOR IP): Performed by: CLINICAL NURSE SPECIALIST

## 2025-07-24 PROCEDURE — 6360000002 HC RX W HCPCS: Performed by: CLINICAL NURSE SPECIALIST

## 2025-07-24 PROCEDURE — 93017 CV STRESS TEST TRACING ONLY: CPT

## 2025-07-24 PROCEDURE — 3430000000 HC RX DIAGNOSTIC RADIOPHARMACEUTICAL: Performed by: RADIOLOGY

## 2025-07-24 PROCEDURE — 99223 1ST HOSP IP/OBS HIGH 75: CPT | Performed by: STUDENT IN AN ORGANIZED HEALTH CARE EDUCATION/TRAINING PROGRAM

## 2025-07-24 PROCEDURE — 93016 CV STRESS TEST SUPVJ ONLY: CPT | Performed by: INTERNAL MEDICINE

## 2025-07-24 PROCEDURE — 93018 CV STRESS TEST I&R ONLY: CPT | Performed by: INTERNAL MEDICINE

## 2025-07-24 PROCEDURE — 78452 HT MUSCLE IMAGE SPECT MULT: CPT

## 2025-07-24 PROCEDURE — A9500 TC99M SESTAMIBI: HCPCS | Performed by: RADIOLOGY

## 2025-07-24 PROCEDURE — 85025 COMPLETE CBC W/AUTO DIFF WBC: CPT

## 2025-07-24 RX ORDER — REGADENOSON 0.08 MG/ML
0.4 INJECTION, SOLUTION INTRAVENOUS
Status: COMPLETED | OUTPATIENT
Start: 2025-07-24 | End: 2025-07-24

## 2025-07-24 RX ORDER — TETRAKIS(2-METHOXYISOBUTYLISOCYANIDE)COPPER(I) TETRAFLUOROBORATE 1 MG/ML
10 INJECTION, POWDER, LYOPHILIZED, FOR SOLUTION INTRAVENOUS
Status: COMPLETED | OUTPATIENT
Start: 2025-07-24 | End: 2025-07-24

## 2025-07-24 RX ORDER — POTASSIUM CHLORIDE 1500 MG/1
40 TABLET, EXTENDED RELEASE ORAL ONCE
Status: COMPLETED | OUTPATIENT
Start: 2025-07-24 | End: 2025-07-24

## 2025-07-24 RX ORDER — TETRAKIS(2-METHOXYISOBUTYLISOCYANIDE)COPPER(I) TETRAFLUOROBORATE 1 MG/ML
30 INJECTION, POWDER, LYOPHILIZED, FOR SOLUTION INTRAVENOUS
Status: COMPLETED | OUTPATIENT
Start: 2025-07-24 | End: 2025-07-24

## 2025-07-24 RX ORDER — MAGNESIUM SULFATE IN WATER 40 MG/ML
2000 INJECTION, SOLUTION INTRAVENOUS ONCE
Status: COMPLETED | OUTPATIENT
Start: 2025-07-24 | End: 2025-07-25

## 2025-07-24 RX ADMIN — METOPROLOL SUCCINATE 25 MG: 25 TABLET, EXTENDED RELEASE ORAL at 21:08

## 2025-07-24 RX ADMIN — Medication 30 MILLICURIE: at 10:31

## 2025-07-24 RX ADMIN — SODIUM CHLORIDE, PRESERVATIVE FREE 10 ML: 5 INJECTION INTRAVENOUS at 08:35

## 2025-07-24 RX ADMIN — INSULIN LISPRO 1 UNITS: 100 INJECTION, SOLUTION INTRAVENOUS; SUBCUTANEOUS at 16:04

## 2025-07-24 RX ADMIN — VALSARTAN 40 MG: 40 TABLET ORAL at 08:32

## 2025-07-24 RX ADMIN — POTASSIUM CHLORIDE 40 MEQ: 1500 TABLET, EXTENDED RELEASE ORAL at 08:32

## 2025-07-24 RX ADMIN — ENOXAPARIN SODIUM 100 MG: 100 INJECTION SUBCUTANEOUS at 21:08

## 2025-07-24 RX ADMIN — ENOXAPARIN SODIUM 100 MG: 100 INJECTION SUBCUTANEOUS at 08:32

## 2025-07-24 RX ADMIN — REGADENOSON 0.4 MG: 0.08 INJECTION, SOLUTION INTRAVENOUS at 11:44

## 2025-07-24 RX ADMIN — INSULIN LISPRO 1 UNITS: 100 INJECTION, SOLUTION INTRAVENOUS; SUBCUTANEOUS at 21:09

## 2025-07-24 RX ADMIN — METOPROLOL SUCCINATE 25 MG: 25 TABLET, EXTENDED RELEASE ORAL at 08:32

## 2025-07-24 RX ADMIN — MAGNESIUM SULFATE HEPTAHYDRATE 2000 MG: 40 INJECTION, SOLUTION INTRAVENOUS at 08:35

## 2025-07-24 RX ADMIN — SODIUM CHLORIDE, PRESERVATIVE FREE 10 ML: 5 INJECTION INTRAVENOUS at 21:09

## 2025-07-24 RX ADMIN — PANTOPRAZOLE SODIUM 40 MG: 40 TABLET, DELAYED RELEASE ORAL at 06:00

## 2025-07-24 RX ADMIN — ATORVASTATIN CALCIUM 10 MG: 10 TABLET, FILM COATED ORAL at 08:32

## 2025-07-24 RX ADMIN — Medication 10 MILLICURIE: at 10:31

## 2025-07-24 NOTE — PROGRESS NOTES
Physician Progress Note      PATIENT:               ROSE DALY  CSN #:                  [id number]  :                       1945  ADMIT DATE:       2025 6:24 PM  DISCH DATE:  RESPONDING  PROVIDER #:        Mirian Lujan DO          QUERY TEXT:    Based on your medical judgment, please clarify these findings and document if   any of the following are being evaluated and/or treated:    The clinical indicators include:  Consult note: New onset Afib: CHADS VASc of at least 4. Lovenox for now,   eliquis on discharge.  -81 YO male,  Hx: HTN, DM  Options provided:  -- Secondary hypercoagulable state in a patient with atrial fibrillation  -- Other - I will add my own diagnosis  -- Disagree - Not applicable / Not valid  -- Disagree - Clinically unable to determine / Unknown  -- Refer to Clinical Documentation Reviewer    PROVIDER RESPONSE TEXT:    This patient has secondary hypercoagulable state in a patient with atrial   fibrillation.    Query created by: Marci Novoa on 2025 1:30 PM      Electronically signed by:  Mirian Lujan DO 2025 9:09 AM

## 2025-07-24 NOTE — PROGRESS NOTES
[facility] Cardiology Inpatient Progress Note    Patient is a 80 y.o. male of Flex Ayoub MD seen in hospital follow up.     Chief complaint: Afib    HPI: Some SOB. No CP.     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)    Primary osteoarthritis of both knees    Hypovitaminosis D    Type 2 diabetes mellitus with chronic kidney disease    Atrial flutter (HCC)    Hypomagnesemia       Allergies   Allergen Reactions    Prednisone Other (See Comments)     \"symptoms of having a stroke\"       Current Facility-Administered Medications   Medication Dose Route Frequency Provider Last Rate Last Admin    magnesium sulfate 2000 mg in 50 mL IVPB premix  2,000 mg IntraVENous Once Lilliam Williamson APRN - CNS 25 mL/hr at 07/24/25 0835 2,000 mg at 07/24/25 0835    metoprolol succinate (TOPROL XL) extended release tablet 25 mg  25 mg Oral BID Mirian Wills, DO   25 mg at 07/24/25 0832    enoxaparin (LOVENOX) injection 100 mg  1 mg/kg SubCUTAneous BID Mirian Wills, DO   100 mg at 07/24/25 0832    valsartan (DIOVAN) tablet 40 mg  40 mg Oral Daily Mirian Wills, DO   40 mg at 07/24/25 0832    sodium chloride flush 0.9 % injection 5-40 mL  5-40 mL IntraVENous 2 times per day Vineet Curiel MD   10 mL at 07/24/25 0835    sodium chloride flush 0.9 % injection 5-40 mL  5-40 mL IntraVENous PRN Vineet Curiel MD        0.9 % sodium chloride infusion   IntraVENous PRN Vineet Curiel MD        potassium chloride (KLOR-CON M) extended release tablet 40 mEq  40 mEq Oral PRN Vineet Curiel MD        Or    potassium bicarb-citric acid (EFFER-K) effervescent tablet 40 mEq  40 mEq Oral PRN Vineet Curiel MD        Or    potassium chloride 10 mEq/100 mL IVPB (Peripheral Line)  10 mEq IntraVENous PRN Vineet Curiel MD        magnesium sulfate 2000 mg in 50 mL IVPB premix  2,000 mg

## 2025-07-24 NOTE — PROGRESS NOTES
[facility] Hospitalist Progress Note    Admitting Date and Time: 7/22/2025  6:24 PM  Admit Dx: Atrial flutter with rapid ventricular response (HCC) [I48.92]    Subjective:  Patient is being followed for Atrial flutter with rapid ventricular response (HCC) [I48.92]       Patient was seen and examined  ROS: denies fever, chills, cp, sob, n/v, HA unless stated above.      metoprolol succinate  25 mg Oral BID    enoxaparin  1 mg/kg SubCUTAneous BID    valsartan  40 mg Oral Daily    sodium chloride flush  5-40 mL IntraVENous 2 times per day    atorvastatin  10 mg Oral Daily    pantoprazole  40 mg Oral QAM AC    insulin lispro  0-4 Units SubCUTAneous 4x Daily AC & HS     sodium chloride flush, 5-40 mL, PRN  sodium chloride, , PRN  potassium chloride, 40 mEq, PRN   Or  potassium alternative oral replacement, 40 mEq, PRN   Or  potassium chloride, 10 mEq, PRN  magnesium sulfate, 2,000 mg, PRN  ondansetron, 4 mg, Q8H PRN   Or  ondansetron, 4 mg, Q6H PRN  polyethylene glycol, 17 g, Daily PRN  acetaminophen, 650 mg, Q6H PRN   Or  acetaminophen, 650 mg, Q6H PRN  glucose, 4 tablet, PRN  dextrose bolus, 125 mL, PRN   Or  dextrose bolus, 250 mL, PRN  glucagon (rDNA), 1 mg, PRN  dextrose, , Continuous PRN         Objective:    /65   Pulse 80   Temp 98 °F (36.7 °C) (Oral)   Resp 18   Ht 1.7 m (5' 6.93\")   Wt 96.7 kg (213 lb 3 oz)   SpO2 94%   BMI 33.46 kg/m²     General Appearance: alert and oriented to person, place and time and in no acute distress  Skin: warm and dry  Head: normocephalic and atraumatic  Eyes: pupils equal, round, and reactive to light, extraocular eye movements intact, conjunctivae normal  Neck: neck supple and non tender without mass   Pulmonary/Chest: clear to auscultation bilaterally- no wheezes, rales or rhonchi, normal air movement, no respiratory distress  Cardiovascular: normal rate, normal S1 and S2 and no carotid bruits  Abdomen: soft, non-tender, non-distended, normal bowel sounds,  no masses or organomegaly  Extremities: no cyanosis, no clubbing and no edema  Neurologic: no cranial nerve deficit and speech normal        Recent Labs     07/23/25  0242 07/23/25  0518 07/24/25  0244   NA Unable to perform testing: Specimen mislabeled. 136 135*   K Unable to perform testing: Specimen mislabeled. 4.2 3.9   CL Unable to perform testing: Specimen mislabeled. 102 102   CO2 Unable to perform testing: Specimen mislabeled. 22 23   BUN Unable to perform testing: Specimen mislabeled. 24* 23   CREATININE Unable to perform testing: Specimen mislabeled. 1.3* 1.2   GLUCOSE Unable to perform testing: Specimen mislabeled. 182* 164*   CALCIUM Unable to perform testing: Specimen mislabeled. 8.2* 8.1*       Recent Labs     07/23/25  0242 07/23/25  0518 07/24/25  0244   WBC Unable to perform testing: Specimen mislabeled. 10.0 8.2   RBC Unable to perform testing: Specimen mislabeled. 3.37* 3.27*   HGB Unable to perform testing: Specimen mislabeled. 10.5* 10.2*   HCT Unable to perform testing: Specimen mislabeled. 33.2* 32.0*   MCV Unable to perform testing: Specimen mislabeled. 98.5 97.9   MCH Unable to perform testing: Specimen mislabeled. 31.2 31.2   MCHC Unable to perform testing: Specimen mislabeled. 31.6* 31.9*   RDW Unable to perform testing: Specimen mislabeled. 13.3 13.2   PLT Unable to perform testing: Specimen mislabeled.  --   --    MPV Unable to perform testing: Specimen mislabeled. 13.4* 13.3*       Radiology:     Assessment:    Principal Problem:    Atrial flutter (HCC)  Active Problems:    Diabetes mellitus type 2 in nonobese (HCC)    Primary hypertension    Hypomagnesemia  Resolved Problems:    * No resolved hospital problems. *      80-year-old male admitted for new onset A-fib    I reviewed his twelve-lead EKG\    I ordered morning labs  I replaced her magnesium with potassium    Due to the high score we will start Lovenox therapeutic dose for CVA prophylaxis discharge with Eliquis  Rate control

## 2025-07-24 NOTE — CARE COORDINATION
CARE MANAGEMENT.... Patient off floor for LARON. Per cardio, patient will need eliquis at discharge. Free 30 day discount card left at the bedside. Discharge plan is home with no needs per our conversation yesterday. Will follow along and assist accordingly.

## 2025-07-24 NOTE — PLAN OF CARE
Problem: Discharge Planning  Goal: Discharge to home or other facility with appropriate resources  7/23/2025 2251 by Josiah Petty, RN  Outcome: Progressing     Problem: Safety - Adult  Goal: Free from fall injury  7/23/2025 2251 by Josiah Petty, RN  Outcome: Progressing

## 2025-07-24 NOTE — PROGRESS NOTES
Spiritual Health History and Assessment/Progress Note  [facility]    Spiritual/Emotional Needs,  ,  ,      Name: Alexander Rivera MRN: [id number]    Age: 80 y.o.     Sex: male   Language: English   Nondenominational: Latter-day   Atrial flutter (HCC)     Date: 7/24/2025                           Spiritual Assessment began in [facility] 4S INTERMEDIATE 1        Referral/Consult From: Rounding   Encounter Overview/Reason: Spiritual/Emotional Needs  Service Provided For: Patient and family together    Cherise, Belief, Meaning:   Patient has beliefs or practices that help with coping during difficult times  Family/Friends have beliefs or practices that help with coping during difficult times      Importance and Influence:  Patient has no beliefs influential to healthcare decision-making identified during this visit  Family/Friends have no beliefs influential to healthcare decision-making identified during this visit    Community:  Patient is connected with a spiritual community  Family/Friends are connected with a spiritual community:    Assessment and Plan of Care:     Patient Interventions include: Facilitated expression of thoughts and feelings and Provided sacramental/Mosque ritual  Family/Friends Interventions include: Facilitated expression of thoughts and feelings and Provided sacramental/Mosque ritual    Patient Plan of Care: No spiritual needs identified for follow-up  Family/Friends Plan of Care: No spiritual needs identified for follow-up    Electronically signed by Chaplain Khoa on 7/24/2025 at 3:51 PM

## 2025-07-25 ENCOUNTER — ANESTHESIA (OUTPATIENT)
Age: 80
End: 2025-07-25
Payer: MEDICARE

## 2025-07-25 ENCOUNTER — HOSPITAL ENCOUNTER (OUTPATIENT)
Age: 80
Discharge: HOME | End: 2025-07-25

## 2025-07-25 ENCOUNTER — ANESTHESIA EVENT (OUTPATIENT)
Age: 80
End: 2025-07-25
Payer: MEDICARE

## 2025-07-25 ENCOUNTER — APPOINTMENT (OUTPATIENT)
Age: 80
End: 2025-07-25
Attending: INTERNAL MEDICINE
Payer: MEDICARE

## 2025-07-25 VITALS
TEMPERATURE: 97.3 F | OXYGEN SATURATION: 97 % | BODY MASS INDEX: 33.32 KG/M2 | RESPIRATION RATE: 18 BRPM | SYSTOLIC BLOOD PRESSURE: 131 MMHG | HEIGHT: 67 IN | DIASTOLIC BLOOD PRESSURE: 68 MMHG | HEART RATE: 72 BPM | WEIGHT: 212.3 LBS

## 2025-07-25 PROBLEM — I48.91 ATRIAL FIBRILLATION WITH RVR (HCC): Status: ACTIVE | Noted: 2025-07-25

## 2025-07-25 LAB
ANION GAP SERPL CALCULATED.3IONS-SCNC: 11 MMOL/L (ref 7–16)
BASOPHILS # BLD: 0.02 K/UL (ref 0–0.2)
BASOPHILS NFR BLD: 0 % (ref 0–2)
BUN SERPL-MCNC: 24 MG/DL (ref 8–23)
CALCIUM SERPL-MCNC: 8.2 MG/DL (ref 8.8–10.2)
CHLORIDE SERPL-SCNC: 103 MMOL/L (ref 98–107)
CO2 SERPL-SCNC: 23 MMOL/L (ref 22–29)
CREAT SERPL-MCNC: 1.3 MG/DL (ref 0.7–1.2)
ECHO AO ASC DIAM: 3.1 CM
ECHO AO ASCENDING AORTA INDEX: 1.5 CM/M2
ECHO AV AREA PEAK VELOCITY: 1.8 CM2
ECHO AV AREA VTI: 2 CM2
ECHO AV AREA/BSA PEAK VELOCITY: 0.9 CM2/M2
ECHO AV AREA/BSA VTI: 1 CM2/M2
ECHO AV CUSP MM: 2.1 CM
ECHO AV MEAN GRADIENT: 5 MMHG
ECHO AV MEAN VELOCITY: 1.1 M/S
ECHO AV PEAK GRADIENT: 10 MMHG
ECHO AV PEAK VELOCITY: 1.6 M/S
ECHO AV VELOCITY RATIO: 0.56
ECHO AV VTI: 37.7 CM
ECHO BSA: 2.1 M2
ECHO EST RA PRESSURE: 3 MMHG
ECHO LA DIAMETER INDEX: 1.88 CM/M2
ECHO LA DIAMETER: 3.9 CM
ECHO LA VOL A-L A2C: 99 ML (ref 18–58)
ECHO LA VOL A-L A4C: 102 ML (ref 18–58)
ECHO LA VOL MOD A2C: 93 ML (ref 18–58)
ECHO LA VOL MOD A4C: 93 ML (ref 18–58)
ECHO LA VOLUME AREA LENGTH: 106 ML
ECHO LA VOLUME INDEX A-L A2C: 48 ML/M2 (ref 16–34)
ECHO LA VOLUME INDEX A-L A4C: 49 ML/M2 (ref 16–34)
ECHO LA VOLUME INDEX AREA LENGTH: 51 ML/M2 (ref 16–34)
ECHO LA VOLUME INDEX MOD A2C: 45 ML/M2 (ref 16–34)
ECHO LA VOLUME INDEX MOD A4C: 45 ML/M2 (ref 16–34)
ECHO LV E' LATERAL VELOCITY: 8 CM/S
ECHO LV E' SEPTAL VELOCITY: 6 CM/S
ECHO LV EF PHYSICIAN: 60 %
ECHO LV FRACTIONAL SHORTENING: 31 % (ref 28–44)
ECHO LV INTERNAL DIMENSION DIASTOLE INDEX: 2.32 CM/M2
ECHO LV INTERNAL DIMENSION DIASTOLIC: 4.8 CM (ref 4.2–5.9)
ECHO LV INTERNAL DIMENSION SYSTOLIC INDEX: 1.59 CM/M2
ECHO LV INTERNAL DIMENSION SYSTOLIC: 3.3 CM
ECHO LV ISOVOLUMETRIC RELAXATION TIME (IVRT): 96.9 MS
ECHO LV IVSD: 1.1 CM (ref 0.6–1)
ECHO LV IVSS: 1.5 CM
ECHO LV MASS 2D: 194 G (ref 88–224)
ECHO LV MASS INDEX 2D: 93.7 G/M2 (ref 49–115)
ECHO LV POSTERIOR WALL DIASTOLIC: 1.1 CM (ref 0.6–1)
ECHO LV POSTERIOR WALL SYSTOLIC: 1.6 CM
ECHO LV RELATIVE WALL THICKNESS RATIO: 0.46
ECHO LVOT AREA: 3.1 CM2
ECHO LVOT AV VTI INDEX: 0.65
ECHO LVOT DIAM: 2 CM
ECHO LVOT MEAN GRADIENT: 2 MMHG
ECHO LVOT PEAK GRADIENT: 3 MMHG
ECHO LVOT PEAK VELOCITY: 0.9 M/S
ECHO LVOT STROKE VOLUME INDEX: 37.3 ML/M2
ECHO LVOT SV: 77.2 ML
ECHO LVOT VTI: 24.6 CM
ECHO MV "A" WAVE DURATION: 161.5 MSEC
ECHO MV A VELOCITY: 0.56 M/S
ECHO MV AREA PHT: 2.7 CM2
ECHO MV AREA VTI: 2.2 CM2
ECHO MV E DECELERATION TIME (DT): 226.8 MS
ECHO MV E VELOCITY: 1.36 M/S
ECHO MV E/A RATIO: 2.43
ECHO MV E/E' LATERAL: 17
ECHO MV E/E' RATIO (AVERAGED): 19.83
ECHO MV E/E' SEPTAL: 22.67
ECHO MV LVOT VTI INDEX: 1.42
ECHO MV MAX VELOCITY: 1.4 M/S
ECHO MV MEAN GRADIENT: 3 MMHG
ECHO MV MEAN VELOCITY: 0.7 M/S
ECHO MV PEAK GRADIENT: 8 MMHG
ECHO MV PRESSURE HALF TIME (PHT): 80.5 MS
ECHO MV VTI: 34.9 CM
ECHO PULMONARY ARTERY END DIASTOLIC PRESSURE: 7 MMHG
ECHO PV MAX VELOCITY: 1.1 M/S
ECHO PV MEAN GRADIENT: 2 MMHG
ECHO PV MEAN VELOCITY: 0.7 M/S
ECHO PV PEAK GRADIENT: 5 MMHG
ECHO PV REGURGITANT MAX VELOCITY: 1.3 M/S
ECHO PV VTI: 23.5 CM
ECHO PVEIN A DURATION: 133.8 MS
ECHO PVEIN A VELOCITY: 0.2 M/S
ECHO PVEIN PEAK D VELOCITY: 0.7 M/S
ECHO PVEIN PEAK S VELOCITY: 0.4 M/S
ECHO PVEIN S/D RATIO: 0.6
ECHO RIGHT VENTRICULAR SYSTOLIC PRESSURE (RVSP): 41 MMHG
ECHO RV INTERNAL DIMENSION: 3.5 CM
ECHO RV TAPSE: 2.2 CM (ref 1.7–?)
ECHO TV REGURGITANT MAX VELOCITY: 3.1 M/S
ECHO TV REGURGITANT PEAK GRADIENT: 38 MMHG
EKG ATRIAL RATE: 86 BPM
EKG P AXIS: 29 DEGREES
EKG P-R INTERVAL: 164 MS
EKG Q-T INTERVAL: 388 MS
EKG QRS DURATION: 92 MS
EKG QTC CALCULATION (BAZETT): 464 MS
EKG R AXIS: 9 DEGREES
EKG T AXIS: 8 DEGREES
EKG VENTRICULAR RATE: 86 BPM
EOSINOPHIL # BLD: 0.18 K/UL (ref 0.05–0.5)
EOSINOPHILS RELATIVE PERCENT: 3 % (ref 0–6)
ERYTHROCYTE [DISTWIDTH] IN BLOOD BY AUTOMATED COUNT: 13.2 % (ref 11.5–15)
GFR, ESTIMATED: 56 ML/MIN/1.73M2
GLUCOSE BLD-MCNC: 141 MG/DL (ref 74–99)
GLUCOSE BLD-MCNC: 214 MG/DL (ref 74–99)
GLUCOSE SERPL-MCNC: 135 MG/DL (ref 74–99)
HCT VFR BLD AUTO: 30.9 % (ref 37–54)
HGB BLD-MCNC: 10.2 G/DL (ref 12.5–16.5)
IMM GRANULOCYTES # BLD AUTO: 0.05 K/UL (ref 0–0.58)
IMM GRANULOCYTES NFR BLD: 1 % (ref 0–5)
LYMPHOCYTES NFR BLD: 1.48 K/UL (ref 1.5–4)
LYMPHOCYTES RELATIVE PERCENT: 22 % (ref 20–42)
MAGNESIUM SERPL-MCNC: 2 MG/DL (ref 1.6–2.4)
MCH RBC QN AUTO: 31.1 PG (ref 26–35)
MCHC RBC AUTO-ENTMCNC: 33 G/DL (ref 32–34.5)
MCV RBC AUTO: 94.2 FL (ref 80–99.9)
MONOCYTES NFR BLD: 0.62 K/UL (ref 0.1–0.95)
MONOCYTES NFR BLD: 9 % (ref 2–12)
NEUTROPHILS NFR BLD: 65 % (ref 43–80)
NEUTS SEG NFR BLD: 4.29 K/UL (ref 1.8–7.3)
PLATELET, FLUORESCENCE: 148 K/UL (ref 130–450)
PMV BLD AUTO: 13.1 FL (ref 7–12)
POTASSIUM SERPL-SCNC: 4.2 MMOL/L (ref 3.5–5.1)
RBC # BLD AUTO: 3.28 M/UL (ref 3.8–5.8)
SODIUM SERPL-SCNC: 137 MMOL/L (ref 136–145)
WBC OTHER # BLD: 6.6 K/UL (ref 4.5–11.5)

## 2025-07-25 PROCEDURE — 93306 TTE W/DOPPLER COMPLETE: CPT | Performed by: INTERNAL MEDICINE

## 2025-07-25 PROCEDURE — 83735 ASSAY OF MAGNESIUM: CPT

## 2025-07-25 PROCEDURE — 99239 HOSP IP/OBS DSCHRG MGMT >30: CPT | Performed by: INTERNAL MEDICINE

## 2025-07-25 PROCEDURE — 99233 SBSQ HOSP IP/OBS HIGH 50: CPT | Performed by: INTERNAL MEDICINE

## 2025-07-25 PROCEDURE — 2500000003 HC RX 250 WO HCPCS: Performed by: STUDENT IN AN ORGANIZED HEALTH CARE EDUCATION/TRAINING PROGRAM

## 2025-07-25 PROCEDURE — 80048 BASIC METABOLIC PNL TOTAL CA: CPT

## 2025-07-25 PROCEDURE — 6370000000 HC RX 637 (ALT 250 FOR IP): Performed by: STUDENT IN AN ORGANIZED HEALTH CARE EDUCATION/TRAINING PROGRAM

## 2025-07-25 PROCEDURE — 6370000000 HC RX 637 (ALT 250 FOR IP): Performed by: INTERNAL MEDICINE

## 2025-07-25 PROCEDURE — 93306 TTE W/DOPPLER COMPLETE: CPT

## 2025-07-25 PROCEDURE — 93010 ELECTROCARDIOGRAM REPORT: CPT | Performed by: INTERNAL MEDICINE

## 2025-07-25 PROCEDURE — 82962 GLUCOSE BLOOD TEST: CPT

## 2025-07-25 PROCEDURE — 85025 COMPLETE CBC W/AUTO DIFF WBC: CPT

## 2025-07-25 RX ORDER — VALSARTAN 40 MG/1
40 TABLET ORAL DAILY
Qty: 30 TABLET | Refills: 2 | Status: SHIPPED | OUTPATIENT
Start: 2025-07-26

## 2025-07-25 RX ORDER — METOPROLOL SUCCINATE 25 MG/1
25 TABLET, EXTENDED RELEASE ORAL 2 TIMES DAILY
Qty: 30 TABLET | Refills: 3 | Status: SHIPPED | OUTPATIENT
Start: 2025-07-25 | End: 2025-07-29 | Stop reason: SDUPTHER

## 2025-07-25 RX ADMIN — SODIUM CHLORIDE, PRESERVATIVE FREE 10 ML: 5 INJECTION INTRAVENOUS at 08:28

## 2025-07-25 RX ADMIN — PANTOPRAZOLE SODIUM 40 MG: 40 TABLET, DELAYED RELEASE ORAL at 06:36

## 2025-07-25 RX ADMIN — ATORVASTATIN CALCIUM 10 MG: 10 TABLET, FILM COATED ORAL at 08:27

## 2025-07-25 RX ADMIN — METOPROLOL SUCCINATE 25 MG: 25 TABLET, EXTENDED RELEASE ORAL at 08:27

## 2025-07-25 RX ADMIN — INSULIN LISPRO 1 UNITS: 100 INJECTION, SOLUTION INTRAVENOUS; SUBCUTANEOUS at 10:27

## 2025-07-25 RX ADMIN — APIXABAN 5 MG: 5 TABLET, FILM COATED ORAL at 08:27

## 2025-07-25 RX ADMIN — VALSARTAN 40 MG: 40 TABLET ORAL at 08:27

## 2025-07-25 NOTE — PLAN OF CARE
Problem: Chronic Conditions and Co-morbidities  Goal: Patient's chronic conditions and co-morbidity symptoms are monitored and maintained or improved  7/24/2025 2138 by Mary Soliz RN  Outcome: Progressing  7/24/2025 1827 by Nam Colunga RN  Outcome: Progressing     Problem: Discharge Planning  Goal: Discharge to home or other facility with appropriate resources  7/24/2025 2138 by Mary Soliz, RN  Outcome: Progressing  7/24/2025 1827 by Nam Colunga RN  Outcome: Progressing     Problem: Safety - Adult  Goal: Free from fall injury  7/24/2025 2138 by Mary Soliz RN  Outcome: Progressing  7/24/2025 1827 by Nam Colunga RN  Outcome: Progressing

## 2025-07-25 NOTE — PLAN OF CARE
Problem: Chronic Conditions and Co-morbidities  Goal: Patient's chronic conditions and co-morbidity symptoms are monitored and maintained or improved  7/25/2025 1509 by Gila Cardoza RN  Outcome: Completed     Problem: Discharge Planning  Goal: Discharge to home or other facility with appropriate resources  7/25/2025 1509 by Gila Cardoza, RN  Outcome: Completed     Problem: Safety - Adult  Goal: Free from fall injury  7/25/2025 1509 by Gila Cardoza, RN  Outcome: Completed

## 2025-07-25 NOTE — PROGRESS NOTES
Message sent to Bahman Jaeger NP regarding if patient is okay to discharge from cardio standpoint. Received message back stating okay for discharge.

## 2025-07-25 NOTE — PROGRESS NOTES
[facility] Cardiology Inpatient Progress Note    Patient is a 80 y.o. male of Flex Ayoub MD seen in hospital follow up.     Chief complaint: Afib    HPI: No CP or SOB.     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)    Primary osteoarthritis of both knees    Hypovitaminosis D    Type 2 diabetes mellitus with chronic kidney disease    Atrial flutter (HCC)    Hypomagnesemia       Allergies   Allergen Reactions    Prednisone Other (See Comments)     \"symptoms of having a stroke\"       Current Facility-Administered Medications   Medication Dose Route Frequency Provider Last Rate Last Admin    metoprolol succinate (TOPROL XL) extended release tablet 25 mg  25 mg Oral BID Mirian Wills DO   25 mg at 07/24/25 2108    enoxaparin (LOVENOX) injection 100 mg  1 mg/kg SubCUTAneous BID Mirian Wills, DO   100 mg at 07/24/25 2108    valsartan (DIOVAN) tablet 40 mg  40 mg Oral Daily Mirian Wills DO   40 mg at 07/24/25 0832    sodium chloride flush 0.9 % injection 5-40 mL  5-40 mL IntraVENous 2 times per day Vineet Curiel MD   10 mL at 07/24/25 2109    sodium chloride flush 0.9 % injection 5-40 mL  5-40 mL IntraVENous PRN Vineet Curiel MD        0.9 % sodium chloride infusion   IntraVENous PRN Vineet Curiel MD        potassium chloride (KLOR-CON M) extended release tablet 40 mEq  40 mEq Oral PRN Vineet Curiel MD        Or    potassium bicarb-citric acid (EFFER-K) effervescent tablet 40 mEq  40 mEq Oral PRN Vineet Curiel MD        Or    potassium chloride 10 mEq/100 mL IVPB (Peripheral Line)  10 mEq IntraVENous PRN Vineet Curiel MD        magnesium sulfate 2000 mg in 50 mL IVPB premix  2,000 mg IntraVENous PRN Vineet Curiel MD   Stopped at 07/23/25 1305    ondansetron (ZOFRAN-ODT) disintegrating tablet 4 mg  4 mg Oral Q8H PRN Vineet Curiel MD        Or

## 2025-07-25 NOTE — PLAN OF CARE
Problem: Chronic Conditions and Co-morbidities  Goal: Patient's chronic conditions and co-morbidity symptoms are monitored and maintained or improved  7/25/2025 0857 by Gila Cardoza RN  Outcome: Progressing     Problem: Discharge Planning  Goal: Discharge to home or other facility with appropriate resources  7/25/2025 0857 by Gila Cardoza RN  Outcome: Progressing     Problem: Safety - Adult  Goal: Free from fall injury  7/25/2025 0857 by Gila Cardoza RN  Outcome: Progressing

## 2025-07-25 NOTE — PROGRESS NOTES
Message sent to Bahman Jaeger NP regarding patient converting to sinus rhythm on his own yesterday after he had the stress test and if we are still doing the LARON. Received message back stating if he is maintaining sinus rhythm to cancel transport and no plan for LARON anymore.

## 2025-07-25 NOTE — DISCHARGE SUMMARY
[facility] Hospitalist       Hospitalist Physician Discharge Summary       Flex Ayoub MD  [address] [facility] [address]  [phone]    Follow up      Mirian Wills DO  [address]  [place] [address]  [phone]    Follow up        Activity level: As tolerated     Diet:  Diabetic diet     Dispo: Home      Condition on discharge - stable     Patient ID:  Alexander Rivera  [id number]  80 y.o.  1945    Admit date: 7/22/2025    Discharge date and time:  7/25/2025  2:24 PM    Admission Diagnoses: Principal Problem:    Atrial flutter (HCC)  Active Problems:    Diabetes mellitus type 2 in nonobese (HCC)    Primary hypertension    Hypomagnesemia  Resolved Problems:    * No resolved hospital problems. *      Discharge Diagnoses: Principal Problem:    Atrial flutter (HCC)  Active Problems:    Diabetes mellitus type 2 in nonobese (HCC)    Primary hypertension    Hypomagnesemia  Resolved Problems:    * No resolved hospital problems. *      Consults:  IP CONSULT TO CARDIOLOGY        Hospital Course:   He is an 80-year-old male with past medical history of hypertension, hyperlipidemia, type 2 diabetes, GERD came to [facility] ER with chest pain and weakness, workup in ED with heart rate in 160s, EKG with a flutter with RVR.  He was started on IV Cardizem. he was transferred over here for admission.  Cardiology was consulted and following.  He was admitted with new onset A-fib.  He converted to normal sinus rhythm.  AHD6XS1-HOIl score of at least 4-so was started on lovenox and transitioned  to Eliquis on discharge.  He was planned to have cardioversion today but was canceled since converted to NSR.  Beta-blockers added for rate control.  Echocardiogram today with LVEF of 55 to 60%.  Cardiology okayed discharge with outpatient follow-up.  Beta-blockers, statin, Eliquis continued.  Other comorbid conditions were managed by continuing home medications.  He will follow-up with

## 2025-07-25 NOTE — PROGRESS NOTES
Message sent to Dr. Padilla regarding cardiology being okay for discharge from their point of view

## 2025-07-26 LAB
EKG ATRIAL RATE: 166 BPM
EKG P AXIS: 69 DEGREES
EKG P-R INTERVAL: 120 MS
EKG Q-T INTERVAL: 284 MS
EKG QRS DURATION: 74 MS
EKG QTC CALCULATION (BAZETT): 469 MS
EKG R AXIS: -1 DEGREES
EKG T AXIS: 85 DEGREES
EKG VENTRICULAR RATE: 164 BPM

## 2025-07-28 ENCOUNTER — CARE COORDINATION (OUTPATIENT)
Dept: CARE COORDINATION | Age: 80
End: 2025-07-28

## 2025-07-28 ENCOUNTER — TELEPHONE (OUTPATIENT)
Dept: ADMINISTRATIVE | Age: 80
End: 2025-07-28

## 2025-07-28 DIAGNOSIS — I48.92 ATRIAL FLUTTER, UNSPECIFIED TYPE (HCC): Primary | ICD-10-CM

## 2025-07-28 PROCEDURE — 1111F DSCHRG MED/CURRENT MED MERGE: CPT | Performed by: FAMILY MEDICINE

## 2025-07-28 NOTE — CARE COORDINATION
Care Transitions Note    Initial Call - Call within 2 business days of discharge: Yes    Patient Current Location:  Home: [address] [address]    Care Transition Nurse contacted the patient by telephone to perform post hospital discharge assessment, verified name and  as identifiers.  Provided introduction to self, and explanation of the Care Transition Nurse role.    Patient: Alexander Rivera    Patient : 1945   MRN: [id number]    Reason for Admission: AFlutter  Discharge Date: 25  RURS: Readmission Risk Score: 13.8      Last Discharge Facility       Date Complaint Diagnosis Description Type Department Provider    25  Atrial flutter, unspecified type (HCC) ... Admission (Discharged) [facility] Vonda Ruiz MD    25 Chest Pain; Extremity Weakness Atrial flutter with rapid ventricular response (HCC) ED (TRANSFER) [facility] ED Solomon Dominguez, DO            Was this an external facility discharge? No    Additional needs identified to be addressed with provider   No needs identified             Method of communication with provider: none.    Patients top risk factors for readmission: lack of knowledge about disease and medical condition-AFlutter, DM, HTN, CKD    Interventions to address risk factors:   Education: DM, HTN, AFlutter, bleeding precautions.  Review of patient management of conditions/medications.    Care Summary Note:   -Patient transferred from [facility] and admitted to [facility] on 25 with symptoms of chest pain, SOB and weakness. See hospital discharge summary for further details.   -Patient pleasant in conversation and states other than being tired with some remaining weakness he is doing well.  Denies any feeling of racing heart.  -Does not check vitals or BG.  Last A1C on 7.3% on 25.  -Patient denies any needs at this time and is agreeable to continued follow up from care transitions.       Care Transition Nurse reviewed discharge instructions with patient. The patient was

## 2025-07-29 ENCOUNTER — OFFICE VISIT (OUTPATIENT)
Dept: FAMILY MEDICINE CLINIC | Age: 80
End: 2025-07-29

## 2025-07-29 VITALS — SYSTOLIC BLOOD PRESSURE: 138 MMHG | OXYGEN SATURATION: 97 % | DIASTOLIC BLOOD PRESSURE: 70 MMHG | HEART RATE: 98 BPM

## 2025-07-29 DIAGNOSIS — N18.30 CKD STAGE 3 SECONDARY TO DIABETES (HCC): ICD-10-CM

## 2025-07-29 DIAGNOSIS — Z09 HOSPITAL DISCHARGE FOLLOW-UP: Primary | ICD-10-CM

## 2025-07-29 DIAGNOSIS — E55.9 HYPOVITAMINOSIS D: ICD-10-CM

## 2025-07-29 DIAGNOSIS — I48.0 PAROXYSMAL ATRIAL FIBRILLATION (HCC): ICD-10-CM

## 2025-07-29 DIAGNOSIS — I10 ESSENTIAL HYPERTENSION: ICD-10-CM

## 2025-07-29 DIAGNOSIS — E11.9 TYPE 2 DIABETES MELLITUS WITHOUT COMPLICATION, WITHOUT LONG-TERM CURRENT USE OF INSULIN (HCC): ICD-10-CM

## 2025-07-29 DIAGNOSIS — E78.2 MIXED HYPERCHOLESTEROLEMIA AND HYPERTRIGLYCERIDEMIA: ICD-10-CM

## 2025-07-29 DIAGNOSIS — E11.22 CKD STAGE 3 SECONDARY TO DIABETES (HCC): ICD-10-CM

## 2025-07-29 RX ORDER — PANTOPRAZOLE SODIUM 40 MG/1
40 TABLET, DELAYED RELEASE ORAL
Qty: 90 TABLET | Refills: 1 | Status: SHIPPED | OUTPATIENT
Start: 2025-07-29

## 2025-07-29 RX ORDER — FLUOCINONIDE TOPICAL SOLUTION USP, 0.05% 0.5 MG/ML
SOLUTION TOPICAL
Qty: 60 ML | Refills: 1 | Status: SHIPPED | OUTPATIENT
Start: 2025-07-29

## 2025-07-29 RX ORDER — METFORMIN HYDROCHLORIDE 500 MG/1
500 TABLET, EXTENDED RELEASE ORAL
Qty: 270 TABLET | Refills: 1 | Status: SHIPPED | OUTPATIENT
Start: 2025-07-29

## 2025-07-29 RX ORDER — ERGOCALCIFEROL 1.25 MG/1
50000 CAPSULE, LIQUID FILLED ORAL WEEKLY
Qty: 12 CAPSULE | Refills: 1 | Status: SHIPPED | OUTPATIENT
Start: 2025-07-29

## 2025-07-29 RX ORDER — ATORVASTATIN CALCIUM 10 MG/1
10 TABLET, FILM COATED ORAL DAILY
Qty: 90 TABLET | Refills: 1 | Status: SHIPPED | OUTPATIENT
Start: 2025-07-29 | End: 2026-01-25

## 2025-07-29 RX ORDER — METOPROLOL SUCCINATE 25 MG/1
25 TABLET, EXTENDED RELEASE ORAL 2 TIMES DAILY
Qty: 30 TABLET | Refills: 3 | Status: SHIPPED | OUTPATIENT
Start: 2025-07-29

## 2025-07-29 RX ORDER — KETOCONAZOLE 20 MG/ML
SHAMPOO, SUSPENSION TOPICAL
Qty: 120 ML | Refills: 1 | Status: SHIPPED | OUTPATIENT
Start: 2025-07-29

## 2025-07-29 RX ORDER — KETOCONAZOLE 20 MG/ML
SHAMPOO, SUSPENSION TOPICAL
Qty: 120 ML | Refills: 1 | Status: SHIPPED | OUTPATIENT
Start: 2025-07-29 | End: 2025-07-29 | Stop reason: SDUPTHER

## 2025-07-29 NOTE — PATIENT INSTRUCTIONS
LOW SALT FOR BLOOD PRESSURE CONTROL.  LOW CARBOHYDRATE FOR BLOOD SUGAR AND WEIGHT CONTROL.  LOW FAT DIET FOR CHOLESTEROL CONTROL.  DRINK ENOUGH FLUIDS FOR BETTER KIDNEY FUNCTION.  TAKE VALSARTAN 40 MG. DAILY  AND TOPROL XL 25 MG 2 TIMES DAILY FOR BLOOD PRESSURE CONTROL.  TAKE ELIQUIS 5 MG. 2 TIMES A DAY AS BLOOD THINNER.  TAKE METFORMIN  MG. 3 TIMES A DAY  FOR BLOOD SUGAR CONTROL.  TAKE LIPITOR 10 MG. NIGHTLY   FOR CHOLESTEROL CONTROL..  TAKE VITAMIN D 02201 UNITS WEEKLY FOR VITAMIN DEFICIENCY.  REGULAR WALKING ADVISED.  ADVISED WEIGHT REDUCTION.  FOLLOW UP WITH  DR. SHIREEN Sanford  FOR PANCREATIC CYST EVALUATION.   FOLLOW UP WITH CARDIOLOGIST AS RECOMMENDED.  NEXT APPOINTMENT IN 1 MONTH FOR ANNUAL PHYSICAL EXAMINATION.

## 2025-07-29 NOTE — PROGRESS NOTES
Post-Discharge Transitional Care Follow Up      Alexander Rivera   YOB: 1945    Date of Office Visit:  7/29/2025  Date of Hospital Admission: 7/22/25  Date of Hospital Discharge: 7/25/25  Readmission Risk Score (high >=14%. Medium >=10%):Readmission Risk Score: 13.8      Care management risk score Rising risk (score 2-5) and Complex Care (Scores >=6): No Risk Score On File     Non face to face  following discharge, date last encounter closed (first attempt may have been earlier): 07/28/2025     Call initiated 2 business days of discharge: Yes     Hospital discharge follow-up  Paroxysmal atrial fibrillation (HCC)  Comments:  NEW ONSET  Type 2 diabetes mellitus without complication, without long-term current use of insulin (HCC)  Comments:  FAIR CONTROL  Mixed hypercholesterolemia and hypertriglyceridemia  Comments:  FAIR CONTROL  Essential hypertension  Comments:  CONTROLLED  CKD stage 3 secondary to diabetes ([facility])  Comments:  STABLE  Hypovitaminosis D  Comments:  IMPROVING      Medical Decision Making: high complexity  Return in about 1 month (around 8/29/2025) for ANNUAL WELLNESS VISIT..         Subjective:   HPI : WAS ADMITTED AT [facility] FOR CHEST TIGHTNESS. DIAGNOSED TO HAVE AFIB. WAS TRANSFERRED TO [facility] FOR CARDIAC CARE. HIS MEDICATIONS WERE CHANGED AND RELEASED. HIS VALSARTAN WAS DECREASED TO 40 MG. DAILY AND METOPROLOL XL 25 MG. AND ELIQUIS WERE ADDED TO THE REGIMEN. HE WAS DISCHARGED WHEN CONDITION STABILIZED.    Inpatient course: Discharge summary reviewed- see chart.    Interval history/Current status: FEEL GOOD BUT STILL FEELS TIRED. NO CHEST PAIN OR SHORTNESS OF BREATH. SCHEDULED TO SEE CARDIOLOGIST.    Patient Active Problem List   Diagnosis    Diabetes mellitus type 2 in nonobese (HCC)    Primary hypertension    Mixed hypercholesterolemia and hypertriglyceridemia    CKD stage 3 secondary to diabetes ([facility])    Primary osteoarthritis of both knees    Hypovitaminosis D

## 2025-07-31 ENCOUNTER — TELEPHONE (OUTPATIENT)
Dept: CARDIOLOGY CLINIC | Age: 80
End: 2025-07-31

## 2025-07-31 NOTE — TELEPHONE ENCOUNTER
Patient was seen by his pcp on 7/29 and was told he is in a-fib, EKG was not done, patient is asymptomatic, heart rate today 79, he is taking Eliquis 5mg and was started on metoprolol 25mg bid, OV scheduled with [facility] next month, please advise

## 2025-07-31 NOTE — TELEPHONE ENCOUNTER
Continue same. Keep OV.     Mirian Wills D.O.  Cardiologist  Cardiology, [facility]

## 2025-08-07 ENCOUNTER — CARE COORDINATION (OUTPATIENT)
Dept: CARE COORDINATION | Age: 80
End: 2025-08-07

## 2025-08-21 ENCOUNTER — CARE COORDINATION (OUTPATIENT)
Dept: CARE COORDINATION | Age: 80
End: 2025-08-21